# Patient Record
Sex: FEMALE | Race: BLACK OR AFRICAN AMERICAN | Employment: FULL TIME | URBAN - METROPOLITAN AREA
[De-identification: names, ages, dates, MRNs, and addresses within clinical notes are randomized per-mention and may not be internally consistent; named-entity substitution may affect disease eponyms.]

---

## 2017-01-25 ENCOUNTER — HOSPITAL ENCOUNTER (OUTPATIENT)
Dept: MAMMOGRAPHY | Age: 42
Discharge: HOME | End: 2017-01-25
Attending: OBSTETRICS & GYNECOLOGY
Payer: COMMERCIAL

## 2017-01-25 ENCOUNTER — HOSPITAL ENCOUNTER (OUTPATIENT)
Dept: ULTRASOUND IMAGING | Age: 42
Discharge: HOME | End: 2017-01-25
Attending: OBSTETRICS & GYNECOLOGY
Payer: COMMERCIAL

## 2017-01-25 DIAGNOSIS — N63.0 BREAST MASS: ICD-10-CM

## 2017-01-25 DIAGNOSIS — N63.10 MASSES OF BOTH BREASTS: Primary | ICD-10-CM

## 2017-01-25 DIAGNOSIS — N63.20 LEFT BREAST MASS: ICD-10-CM

## 2017-01-25 DIAGNOSIS — R92.8 ABNORMAL MAMMOGRAM OF BOTH BREASTS: Primary | ICD-10-CM

## 2017-01-25 DIAGNOSIS — N63.20 MASSES OF BOTH BREASTS: Primary | ICD-10-CM

## 2017-01-25 PROCEDURE — 76642 ULTRASOUND BREAST LIMITED: CPT

## 2017-01-25 PROCEDURE — 77066 DX MAMMO INCL CAD BI: CPT

## 2017-02-07 ENCOUNTER — HOSPITAL ENCOUNTER (OUTPATIENT)
Dept: MAMMOGRAPHY | Age: 42
Discharge: HOME | End: 2017-02-07
Attending: OBSTETRICS & GYNECOLOGY
Payer: COMMERCIAL

## 2017-02-07 DIAGNOSIS — R92.8 ABNORMAL MAMMOGRAM OF BOTH BREASTS: ICD-10-CM

## 2017-02-07 DIAGNOSIS — R92.0 BREAST MICROCALCIFICATIONS: ICD-10-CM

## 2017-02-07 DIAGNOSIS — N64.9 LESION OF BREAST: ICD-10-CM

## 2017-02-07 DIAGNOSIS — N63.20 BREAST MASS, LEFT: ICD-10-CM

## 2017-02-07 PROCEDURE — 74011000250 HC RX REV CODE- 250: Performed by: RADIOLOGY

## 2017-02-07 PROCEDURE — 19081 BX BREAST 1ST LESION STRTCTC: CPT

## 2017-02-07 PROCEDURE — 77065 DX MAMMO INCL CAD UNI: CPT

## 2017-02-07 PROCEDURE — 88305 TISSUE EXAM BY PATHOLOGIST: CPT | Performed by: OBSTETRICS & GYNECOLOGY

## 2017-02-07 RX ORDER — LIDOCAINE HYDROCHLORIDE AND EPINEPHRINE 10; 10 MG/ML; UG/ML
1.5 INJECTION, SOLUTION INFILTRATION; PERINEURAL
Status: COMPLETED | OUTPATIENT
Start: 2017-02-07 | End: 2017-02-07

## 2017-02-07 RX ORDER — LIDOCAINE HYDROCHLORIDE 10 MG/ML
10 INJECTION INFILTRATION; PERINEURAL
Status: COMPLETED | OUTPATIENT
Start: 2017-02-07 | End: 2017-02-07

## 2017-02-07 RX ADMIN — LIDOCAINE HYDROCHLORIDE 7 ML: 10 INJECTION, SOLUTION INFILTRATION; PERINEURAL at 08:47

## 2017-02-07 RX ADMIN — LIDOCAINE HYDROCHLORIDE,EPINEPHRINE BITARTRATE 10 MG: 10; .01 INJECTION, SOLUTION INFILTRATION; PERINEURAL at 09:36

## 2017-02-07 RX ADMIN — LIDOCAINE HYDROCHLORIDE 10 ML: 10 INJECTION, SOLUTION INFILTRATION; PERINEURAL at 09:37

## 2017-02-07 RX ADMIN — LIDOCAINE HYDROCHLORIDE,EPINEPHRINE BITARTRATE 10 ML: 10; .01 INJECTION, SOLUTION INFILTRATION; PERINEURAL at 08:48

## 2017-02-07 NOTE — IP AVS SNAPSHOT
Current Discharge Medication List  
  
ASK your doctor about these medications Dose & Instructions Dispensing Information Comments Morning Noon Evening Bedtime  
 cyanocobalamin 500 mcg tablet Commonly known as:  VITAMIN B12 Your next dose is: Today, Tomorrow Other:  ____________ Dose:  500 mcg Take 500 mcg by mouth daily. Refills:  0  
     
   
   
   
  
 triamcinolone 55 mcg nasal inhaler Commonly known as:  NASACORT AQ Your next dose is: Today, Tomorrow Other:  ____________ Dose:  2 Spray 2 Sprays by Both Nostrils route daily. Quantity:  1 Bottle Refills:  0

## 2017-02-07 NOTE — PROGRESS NOTES
POST STEREOTACTIC BIOPSY INSTRUCTIONS    1. WOUND CARE:   -Please keep an ice pack on the biopsy site for 20 minutes each hour through the day of the procedure. May     discontinue ice at bedtime. Do not apply ice directly to skin, protect by using thin cloth covering.    -The morning after biopsy, you may remove the gauze pad dressing. DO NOT REMOVE STERI-STRIPS(TAPE    STRIPS ON SKIN) FROM BIOPSY SITE. Allow them to wear off in 3-4 days. 2. Please wear your most supportive bra for 24 hours. Sleep in your bra the night of the biopsy. This will reduce the risk of bleeding, also help to hold ice pack in place. 3. You may shower after 24 hours, otherwise please keep site dry. Steri-strips may get wet. 4. If bleeding occurs from the site:      Apply direct pressure to the site, holding it tightly for 5 minutes      If bleeding continues after 5 minutes, call your physician  5. NO HEAVY LIFTING (GREATER THAN 5 lbs), vacuuming and/or sports activities for 48 hours after biopsy  6. Your pathology results are usually complete within 48 hours of the procedure time, excluding weekends. 7. You may take TYLENOL extra strength for pain. NO aspirin or aspirin containing products. 8. Call your doctor for a follow up appointment.

## 2017-02-07 NOTE — PROGRESS NOTES
Patient arrived for Left breast stereotactic biopsy with clip placement. Pt arrived in [facility] ambulatory, alert and oriented. Pt tolerated biopsy well without complaint. Specimens obtained, x-rayed and placed in labeled specimen containers for pathology. Direct pressure applied to incision site, bleeding controlled, steri- strips applied and covered with gauze prior to post mammogram films for confirmation of clip placement. Clean dry gauze and Ice pack applied and bra on. Reviewed post procedure discharge instructions with patient. Patient verbalizes understanding and written copy given to patient. Patient D/C'd home.

## 2017-02-07 NOTE — PROGRESS NOTES
Patient arrived for Right breast stereotactic biopsy with clip placement. Pt arrived in [facility] ambulatory, alert and oriented. Pt tolerated biopsy well without complaint. Specimens obtained, x-rayed and placed in labeled specimen containers for pathology. Direct pressure applied to incision site, bleeding controlled, steri- strips applied and covered with gauze prior to post mammogram films for confirmation of clip placement. Clean dry gauze and Ice pack applied and bra on. Reviewed post procedure discharge instructions with patient. Patient verbalizes understanding and written copy given to patient. Patient D/C'd home.

## 2017-02-07 NOTE — IP AVS SNAPSHOT
[facility] 
 
 
 [address] [facility] 
[phone] Patient: Beck Nowak MRN: [id number] :1975 You are allergic to the following No active allergies Recent Documentation OB Status Smoking Status Having regular periods Former Smoker Emergency Contacts Name Discharge Info Relation Home Work Mobile Izabela Mcgee  Other Relative [6] [phone] About your hospitalization You were admitted on:  2017 You last received care in the:  [facility] [place] [place] You were discharged on:  2017 Unit phone number:  [phone] Why you were hospitalized Your primary diagnosis was:  Not on File Providers Seen During Your Hospitalizations Provider Role Specialty Primary office phone Isabella Cardona DO Attending Provider Obstetrics & Gynecology [phone] Your Primary Care Physician (PCP) Primary Care Physician Office Phone Office Fax [address] [phone] Follow-up Information None Your Appointments 2017  9:00 AM EST  
[facility] 3D BELKYS MAMMO DX with [facility] ЕКАТЕРИНА STEREO BX RM 1 [facility] [address] [address] [facility] PA[facility]MENT  For Non-Medicare patients - $15.00 will be collected from you at the time of your exam.  You will be billed $35.00 from the reading [facility]. OUTSIDE FILMS  - Any outside films related to the study being scheduled should be brought with you on the day of the exam.  If this cannot be done there may be a delay in the reading of the study.   MEDICATIONS  - Patient must bring a complete list of all medications currently taking to include prescriptions, over-the-counter meds, herbals, vitamins & any dietary supplements GENERAL INSTRUCTIONS  - On the day of your exam do not use any bath powder, deodorant or lotions on the armpit area. CHECK IN INSTRUCTIONS  Check in to the [facility] [address] 15 minutes prior to your appointment. [address] ([address]  [address] [place], [address] [facility] [address] Current Discharge Medication List  
  
ASK your doctor about these medications Dose & Instructions Dispensing Information Comments Morning Noon Evening Bedtime  
 cyanocobalamin 500 mcg tablet Commonly known as:  VITAMIN B12 Your next dose is: Today, Tomorrow Other:  _________ Dose:  500 mcg Take 500 mcg by mouth daily. Refills:  0  
     
   
   
   
  
 triamcinolone 55 mcg nasal inhaler Commonly known as:  NASACORT AQ Your next dose is: Today, Tomorrow Other:  _________ Dose:  2 Spray 2 Sprays by Both Nostrils route daily. Quantity:  1 Bottle Refills:  0 Discharge Instructions POST STEREOTACTIC BIOPSY INSTRUCTIONS 1. WOUND CARE: 
 -Please keep an ice pack on the biopsy site for 20 minutes each hour through the day of the procedure. May     discontinue ice at bedtime. Do not apply ice directly to skin, protect by using thin cloth covering. 
 
-The morning after biopsy, you may remove the gauze pad dressing. DO NOT REMOVE STERI-STRIPS(TAPE    STRIPS ON SKIN) FROM BIOPSY SITE. Allow them to wear off in 3-4 days. 2. Please wear your most supportive bra for 24 hours. Sleep in your bra the night of the biopsy. This will reduce the risk of bleeding, also help to hold ice pack in place. 3. You may shower after 24 hours, otherwise please keep site dry. Steri-strips may get wet. 4. If bleeding occurs from the site: 
    Apply direct pressure to the site, holding it tightly for 5 minutes If bleeding continues after 5 minutes, call your physician 5. NO HEAVY LIFTING (GREATER THAN 5 lbs), vacuuming and/or sports activities for 48 hours after biopsy 6. Your pathology results are usually complete within 48 hours of the procedure time, excluding weekends. 7. You may take TYLENOL extra strength for pain. NO aspirin or aspirin containing products. 8. Call your doctor for a follow up appointment. Discharge Orders None Introducing [facility] & HEALTH SERVICES! [facility] introduces [employer] patient portal. Now you can access parts of your medical record, email your doctor's office, and request medication refills online. 1. In your internet browser, go to [email or web address] 2. Click on the First Time User? Click Here link in the Sign In box. You will see the New Member Sign Up page. 3. Enter your [employer] Access Code exactly as it appears below. You will not need to use this code after youve completed the sign-up process. If you do not sign up before the expiration date, you must request a new code. · [employer] Access Code: [email or web address] Expires: 3/22/2017  3:06 PM 
 
4. Enter the last four digits of your Social Security Number (xxxx) and Date of Birth (mm/dd/yyyy) as indicated and click Submit. You will be taken to the next sign-up page. 5. Create a [employer] ID. This will be your [employer] login ID and cannot be changed, so think of one that is secure and easy to remember. 6. Create a [employer] password. You can change your password at any time. 7. Enter your Password Reset Question and Answer. This can be used at a later time if you forget your password. 8. Enter your e-mail address. You will receive e-mail notification when new information is available in [address] 9. Click Sign Up. You can now view and download portions of your medical record. 10. Click the Download Summary menu link to download a portable copy of your medical information.  
 
If you have questions, please visit the Frequently Asked Questions section of the [employer]. Remember, MyChart is NOT to be used for urgent needs. For medical emergencies, dial 911. Now available from your iPhone and Android! General Information Please provide this summary of care documentation to your next provider. Patient Signature:  ____________________________________________________________ Date:  ____________________________________________________________  
  
[facility] [place] Provider Signature:  ____________________________________________________________ Date:  ____________________________________________________________

## 2017-06-12 ENCOUNTER — OFFICE VISIT (OUTPATIENT)
Dept: INTERNAL MEDICINE CLINIC | Age: 42
End: 2017-06-12

## 2017-06-12 VITALS
RESPIRATION RATE: 14 BRPM | TEMPERATURE: 97.2 F | DIASTOLIC BLOOD PRESSURE: 77 MMHG | SYSTOLIC BLOOD PRESSURE: 118 MMHG | HEIGHT: 63 IN | OXYGEN SATURATION: 100 % | HEART RATE: 93 BPM | BODY MASS INDEX: 24.59 KG/M2 | WEIGHT: 138.8 LBS

## 2017-06-12 DIAGNOSIS — M54.9 UPPER BACK PAIN: Primary | ICD-10-CM

## 2017-06-12 RX ORDER — IBUPROFEN 200 MG
TABLET ORAL
COMMUNITY
End: 2017-06-12 | Stop reason: DRUGHIGH

## 2017-06-12 RX ORDER — IBUPROFEN 800 MG/1
800 TABLET ORAL
Qty: 30 TAB | Refills: 0 | Status: SHIPPED | OUTPATIENT
Start: 2017-06-12 | End: 2018-01-25

## 2017-06-12 NOTE — LETTER
NOTIFICATION RETURN TO WORK / SCHOOL 
 
6/12/2017 2:58 PM 
 
Ms. Ruthie Song [address] [place] [address] [phone] To Whom It May Concern: Ruthie Song is currently under the care of Oscar Roy. She will return to work on 6/14/17. If there are questions or concerns please have the patient contact our office. Sincerely, Romero Kern MD

## 2017-06-12 NOTE — MR AVS SNAPSHOT
Visit Information Date & Time Provider Department Dept. Phone Encounter #  
 6/12/2017  2:30 PM Yogi Hernandez MD [employer] [phone] [id number] Follow-up Instructions Return if symptoms worsen or fail to improve. Upcoming Health Maintenance Date Due  
 PAP AKA CERVICAL CYTOLOGY 11/1/2016 INFLUENZA AGE 9 TO ADULT 8/1/2017 DTaP/Tdap/Td series (2 - Td) 4/22/2025 Allergies as of 6/12/2017  Review Complete On: 6/12/2017 By: Reinaldo Lock MD  
 No Known Allergies Current Immunizations  Reviewed on 7/14/2016 Name Date Influenza Vaccine (Quad) PF 1/13/2016 11:30 AM  
 Tdap 4/22/2015 10:37 AM  
  
 Not reviewed this visit You Were Diagnosed With   
  
 Codes Comments Upper back pain    -  Primary ICD-10-CM: M54.9 ICD-9-CM: 724.5 Vitals BP Pulse Temp Resp Height(growth percentile) Weight(growth percentile) 118/77 (BP 1 Location: Right arm, BP Patient Position: Sitting) 93 97.2 °F (36.2 °C) (Oral) 14 5' 3\" (1.6 m) 138 lb 12.8 oz (63 kg) SpO2 BMI OB Status Smoking Status 100% 24.59 kg/m2 Having regular periods Former Smoker BMI and BSA Data Body Mass Index Body Surface Area 24.59 kg/m 2 1.67 m 2 Preferred Pharmacy Pharmacy Name Phone [address] Your Updated Medication List  
  
   
This list is accurate as of: 6/12/17  2:56 PM.  Always use your most recent med list.  
  
  
  
  
 cyanocobalamin 500 mcg tablet Commonly known as:  VITAMIN B12 Take 500 mcg by mouth daily. ibuprofen 800 mg tablet Commonly known as:  MOTRIN Take 1 Tab by mouth every eight (8) hours as needed for Pain. Prescriptions Sent to Pharmacy Refills  
 ibuprofen (MOTRIN) 800 mg tablet 0 Sig: Take 1 Tab by mouth every eight (8) hours as needed for Pain.   
 Class: Normal  
 Pharmacy: [address] [place] [address] #: [phone] Route: Oral  
  
Follow-up Instructions Return if symptoms worsen or fail to improve. Patient Instructions 1) use heating pad, icy/hot, tiger balm. 2) Take ibuprofen With food. If you notice any blood/black tarry stools, diarrhea, abdominal pain, nausea, vomiting then stop medication immediately. Give us a call ASAP. Healthy Upper Back: Exercises Your Care Instructions Here are some examples of exercises for your upper back. Start each exercise slowly. Ease off the exercise if you start to have pain. Your doctor or physical therapist will tell you when you can start these exercises and which ones will work best for you. How to do the exercises Lower neck and upper back stretch 1. Stretch your arms out in front of your body. Clasp one hand on top of your other hand. 2. Gently reach out so that you feel your shoulder blades stretching away from each other. 3. Gently bend your head forward. 4. Hold for 15 to 30 seconds. 5. Repeat 2 to 4 times. Midback stretch Note: If you have knee pain, do not do this exercise. 1. Kneel on the floor, and sit back on your ankles. 2. Lean forward, place your hands on the floor, and stretch your arms out in front of you. Rest your head between your arms. 3. Gently push your chest toward the floor, reaching as far in front of you as possible. 4. Hold for 15 to 30 seconds. 5. Repeat 2 to 4 times. Shoulder rolls 1. Sit comfortably with your feet shoulder-width apart. You can also do this exercise while standing. 2. Roll your shoulders up, then back, and then down in a smooth, circular motion. 3. Repeat 2 to 4 times. Wall push-up 1. Stand against a wall with your feet about 12 to 24 inches back from the wall. If you feel any pain when you do this exercise, stand closer to the wall. 2. Place your hands on the wall slightly wider apart than your shoulders, and lean forward. 3. Gently lean your body toward the wall. Then push back to your starting position. Keep the motion smooth and controlled. 4. Repeat 8 to 12 times. Resisted shoulder blade squeeze Note: For this exercise, you will need elastic exercise material, such as surgical tubing or Thera-Band. 1. Sit or stand, holding the band in both hands in front of you. Keep your elbows close to your sides, bent at a 90-degree angle. Your palms should face up. 2. Squeeze your shoulder blades together, and move your arms to the outside, stretching the band. Be sure to keep your elbows at your sides while you do this. 3. Relax. 4. Repeat 8 to 12 times. Resisted rows Note: For this exercise, you will need elastic exercise material, such as surgical tubing or Thera-Band. 1. Put the band around a solid object, such as a bedpost, at about waist level. Hold one end of the band in each hand. 2. With your elbows at your sides and bent to 90 degrees, pull the band back to move your shoulder blades toward each other. Return to the starting position. 3. Repeat 8 to 12 times. Follow-up care is a key part of your treatment and safety. Be sure to make and go to all appointments, and call your doctor if you are having problems. It's also a good idea to know your test results and keep a list of the medicines you take. Where can you learn more? Go to http://luana-catalina.info/. Enter E321 in the search box to learn more about \"Healthy Upper Back: Exercises. \" Current as of: May 23, 2016 Content Version: 11.2 © 7447-3207 [employer], [employer]. Care instructions adapted under license by [employer] (which disclaims liability or warranty for this information).  If you have questions about a medical condition or this instruction, always ask your healthcare professional. Sherley Santiago, [employer] disclaims any warranty or liability for your use of this information. Introducing [facility] & HEALTH SERVICES! [facility] introduces [employer] patient portal. Now you can access parts of your medical record, email your doctor's office, and request medication refills online. 1. In your internet browser, go to [email or web address] 2. Click on the First Time User? Click Here link in the Sign In box. You will see the New Member Sign Up page. 3. Enter your [employer] Access Code exactly as it appears below. You will not need to use this code after youve completed the sign-up process. If you do not sign up before the expiration date, you must request a new code. · [employer] Access Code: [email or web address] Expires: 8/27/2017  4:36 PM 
 
4. Enter the last four digits of your Social Security Number (xxxx) and Date of Birth (mm/dd/yyyy) as indicated and click Submit. You will be taken to the next sign-up page. 5. Create a [employer] ID. This will be your [employer] login ID and cannot be changed, so think of one that is secure and easy to remember. 6. Create a [employer] password. You can change your password at any time. 7. Enter your Password Reset Question and Answer. This can be used at a later time if you forget your password. 8. Enter your e-mail address. You will receive e-mail notification when new information is available in [address] 9. Click Sign Up. You can now view and download portions of your medical record. 10. Click the Download Summary menu link to download a portable copy of your medical information. If you have questions, please visit the Frequently Asked Questions section of the [employer] website. Remember, [employer] is NOT to be used for urgent needs. For medical emergencies, dial 911. Now available from your iPhone and Android! Please provide this summary of care documentation to your next provider. Your primary care clinician is listed as Yogi Mayberry. If you have any questions after today's visit, please call [phone].

## 2017-06-12 NOTE — PATIENT INSTRUCTIONS
1) use heating pad, icy/hot, tiger balm. 2) Take ibuprofen With food. If you notice any blood/black tarry stools, diarrhea, abdominal pain, nausea, vomiting then stop medication immediately. Give us a call ASAP. Healthy Upper Back: Exercises  Your Care Instructions  Here are some examples of exercises for your upper back. Start each exercise slowly. Ease off the exercise if you start to have pain. Your doctor or physical therapist will tell you when you can start these exercises and which ones will work best for you. How to do the exercises  Lower neck and upper back stretch    1. Stretch your arms out in front of your body. Clasp one hand on top of your other hand. 2. Gently reach out so that you feel your shoulder blades stretching away from each other. 3. Gently bend your head forward. 4. Hold for 15 to 30 seconds. 5. Repeat 2 to 4 times. Midback stretch    Note: If you have knee pain, do not do this exercise. 1. Kneel on the floor, and sit back on your ankles. 2. Lean forward, place your hands on the floor, and stretch your arms out in front of you. Rest your head between your arms. 3. Gently push your chest toward the floor, reaching as far in front of you as possible. 4. Hold for 15 to 30 seconds. 5. Repeat 2 to 4 times. Shoulder rolls    1. Sit comfortably with your feet shoulder-width apart. You can also do this exercise while standing. 2. Roll your shoulders up, then back, and then down in a smooth, circular motion. 3. Repeat 2 to 4 times. Wall push-up    1. Stand against a wall with your feet about 12 to 24 inches back from the wall. If you feel any pain when you do this exercise, stand closer to the wall. 2. Place your hands on the wall slightly wider apart than your shoulders, and lean forward. 3. Gently lean your body toward the wall. Then push back to your starting position. Keep the motion smooth and controlled. 4. Repeat 8 to 12 times.   Resisted shoulder blade squeeze    Note: For this exercise, you will need elastic exercise material, such as surgical tubing or Thera-Band. 1. Sit or stand, holding the band in both hands in front of you. Keep your elbows close to your sides, bent at a 90-degree angle. Your palms should face up. 2. Squeeze your shoulder blades together, and move your arms to the outside, stretching the band. Be sure to keep your elbows at your sides while you do this. 3. Relax. 4. Repeat 8 to 12 times. Resisted rows    Note: For this exercise, you will need elastic exercise material, such as surgical tubing or Thera-Band. 1. Put the band around a solid object, such as a bedpost, at about waist level. Hold one end of the band in each hand. 2. With your elbows at your sides and bent to 90 degrees, pull the band back to move your shoulder blades toward each other. Return to the starting position. 3. Repeat 8 to 12 times. Follow-up care is a key part of your treatment and safety. Be sure to make and go to all appointments, and call your doctor if you are having problems. It's also a good idea to know your test results and keep a list of the medicines you take. Where can you learn more? Go to http://luana-catalina.info/. Enter Q617 in the search box to learn more about \"Healthy Upper Back: Exercises. \"  Current as of: May 23, 2016  Content Version: 11.2  © 6621-0633 [employer], [employer]. Care instructions adapted under license by [employer] (which disclaims liability or warranty for this information). If you have questions about a medical condition or this instruction, always ask your healthcare professional. [place] [address] any warranty or liability for your use of this information.

## 2017-06-12 NOTE — PROGRESS NOTES
ROOM # 1    Poppy Cotton presents today for   Chief Complaint   Patient presents with    Back Pain     Patients that she has upper back pain x1 day, Patient also states that she took 2 advil at 7:00 am and 2 at12:15pm. Patient denies falling or trauma        Jovana Mcgee preferred language for health care discussion is english/other. Is someone accompanying this pt? no    Is the patient using any DME equipment during OV? no    Depression Screening:  PHQ over the last two weeks 6/12/2017 7/14/2016 7/14/2016 1/13/2016 4/1/2014   Little interest or pleasure in doing things Not at all Not at all Not at all Not at all Not at all   Feeling down, depressed or hopeless Not at all Not at all Not at all Not at all Several days   Total Score PHQ 2 0 0 0 0 1       Learning Assessment:  Learning Assessment 6/12/2017 4/22/2015 11/1/2013 3/26/2013   PRIMARY LEARNER Patient Patient Patient Patient   HIGHEST LEVEL OF EDUCATION - PRIMARY LEARNER  [facility] - -   CO-LEARNER CAREGIVER No - - -   [address]   LEARNER PREFERENCE PRIMARY DEMONSTRATION DEMONSTRATION READING LISTENING     - - DEMONSTRATION -   ANSWERED BY patient Patient patient Patient   RELATIONSHIP SELF SELF SELF SELF       Abuse Screening:  No flowsheet data found. Fall Risk  No flowsheet data found. Health Maintenance reviewed and discussed per provider. Yes    Poppy Cotton is due for PAP (getting release for records). Please order/place referral if appropriate. Advance Directive:  1. Do you have an advance directive in place? Patient Reply: no     2. If not, would you like material regarding how to put one in place? Patient Reply: yes    Coordination of Care:  1. Have you been to the ER, urgent care clinic since your last visit? Hospitalized since your last visit? no    2.  Have you seen or consulted any other health care providers outside of the [facility] since your last visit? Include any pap smears or colon screening.  no

## 2017-06-12 NOTE — PROGRESS NOTES
Chief Complaint   Patient presents with    Back Pain     Patients that she has upper back pain x1 day, Patient also states that she took 2 advil at 7:00 am and 2 at12:15pm. Patient denies falling or trauma        HPI:     Ashley Hansen is a 38 y.o.  female with history of  Fibroid and iron deficiency anemia  here for the above complaint. She is having upper back pain that started yesterday. No trauma. Pain scale: 9/10. Off and on pain. She took two 200mg advil this AM and at 12:15pm. Sharp stabbing pain. No radiation. She has to move around to decrease the pain. advil does not help. She denies any chest pain, shortness of breath, abdominal pain, headaches or dizziness. No dysuria, hematuria, increase urgency or frequency. She initially said mid back pain, that's why asked urinary symptoms. However, on exam, pt pointing to upper back area. Past Medical History:   Diagnosis Date    Anemia NEC as a child    iron deficiency     Breast lump 2017    Left Breast Lump    Fibroid      Past Surgical History:   Procedure Laterality Date    ABDOMEN SURGERY PROC UNLISTED  ? or 2006    ob/gyn    HX  SECTION       Current Outpatient Prescriptions   Medication Sig    ibuprofen (MOTRIN) 800 mg tablet Take 1 Tab by mouth every eight (8) hours as needed for Pain.  cyanocobalamin (VITAMIN B12) 500 mcg tablet Take 500 mcg by mouth daily. No current facility-administered medications for this visit. Health Maintenance   Topic Date Due    PAP AKA CERVICAL CYTOLOGY  2016    INFLUENZA AGE 9 TO ADULT  2017    DTaP/Tdap/Td series (2 - Td) 2025     Immunization History   Administered Date(s) Administered    Influenza Vaccine (Quad) PF 2016    Tdap 2015     No LMP recorded.         Allergies and Intolerances:   No Known Allergies    Family History:   Family History   Problem Relation Age of Onset    Hypertension Mother     No Known Problems Father Social History:   She  reports that she quit smoking about 9 years ago. Her smoking use included Cigarettes and Cigars. She smoked 0.09 packs per day. She has never used smokeless tobacco.  She  reports that she drinks about 0.5 oz of alcohol per week         ·     OBJECTIVE:   Physical exam:   Visit Vitals    /77 (BP 1 Location: Right arm, BP Patient Position: Sitting)    Pulse 93    Temp 97.2 °F (36.2 °C) (Oral)    Resp 14    Ht 5' 3\" (1.6 m)    Wt 138 lb 12.8 oz (63 kg)    SpO2 100%    BMI 24.59 kg/m2        Generally: Pleasant female in no acute distress  Cardiac Exam: regular, rate, and rhythm. Normal S1 and S2. No murmurs, gallops, or rubs  Pulmonary exam: Clear to auscultation bilaterally  Abdominal exam: Positive bowel sounds in all four quadrants, soft, nondistended, nontender  Extremities: 2+ dorsalis pedis pulses bilaterally. No pedal edema    bilaterally  Upper back exam: TTP in the upper back area just above the bra line. LABS/RADIOLOGICAL TESTS:  Lab Results   Component Value Date/Time    WBC 4.2 04/22/2015 10:31 AM    HGB 10.5 04/22/2015 10:31 AM    HCT 36.0 04/22/2015 10:31 AM    PLATELET 547 76/93/2815 10:31 AM     Lab Results   Component Value Date/Time    Sodium 140 12/27/2013 07:46 AM    Potassium 3.2 12/27/2013 07:46 AM    Chloride 107 12/27/2013 07:46 AM    CO2 27 12/27/2013 07:46 AM    Glucose 68 12/27/2013 07:46 AM    BUN 6 12/27/2013 07:46 AM    Creatinine 0.79 12/27/2013 07:46 AM     Lab Results   Component Value Date/Time    Cholesterol, total 145 03/29/2013 12:00 AM    HDL Cholesterol 53 03/29/2013 12:00 AM    LDL, calculated 77 03/29/2013 12:00 AM    Triglyceride 74 03/29/2013 12:00 AM     No results found for: GPT    Previous labs    ASSESSMENT/PLAN:    1. Upper back pain: musculoskeletal. Use heating pad, icy/hot, tiger balm. Given exercises. Pt told to take ibuprofen With food.  If you notice any blood/black tarry stools, diarrhea, abdominal pain, nausea, vomiting then stop medication immediately. Give us a call ASAP. -     ibuprofen (MOTRIN) 800 mg tablet; Take 1 Tab by mouth every eight (8) hours as needed for Pain. 2. Return to work letter for 6/14/17. Requested Prescriptions     Signed Prescriptions Disp Refills    ibuprofen (MOTRIN) 800 mg tablet 30 Tab 0     Sig: Take 1 Tab by mouth every eight (8) hours as needed for Pain. 3. Patient verbalized understanding and agreement with the plan. 4. Patient was given an after-visit summary. 5.   Follow-up Disposition:  Return if symptoms worsen or fail to improve. or sooner if worsening symptoms.           Ana Dorsey MD

## 2017-06-13 ENCOUNTER — DOCUMENTATION ONLY (OUTPATIENT)
Dept: INTERNAL MEDICINE CLINIC | Age: 42
End: 2017-06-13

## 2017-06-13 NOTE — PROGRESS NOTES
Pt wants more time off from work because back is still hurting. Return to work letter generated to go back to work on 6/16/17. Pt will come and  letter.

## 2018-01-25 ENCOUNTER — OFFICE VISIT (OUTPATIENT)
Dept: INTERNAL MEDICINE CLINIC | Age: 43
End: 2018-01-25

## 2018-01-25 VITALS
SYSTOLIC BLOOD PRESSURE: 100 MMHG | BODY MASS INDEX: 25.45 KG/M2 | TEMPERATURE: 96.8 F | RESPIRATION RATE: 17 BRPM | HEIGHT: 63 IN | WEIGHT: 143.6 LBS | HEART RATE: 87 BPM | OXYGEN SATURATION: 100 % | DIASTOLIC BLOOD PRESSURE: 66 MMHG

## 2018-01-25 DIAGNOSIS — J40 BRONCHITIS: Primary | ICD-10-CM

## 2018-01-25 RX ORDER — BENZONATATE 100 MG/1
100 CAPSULE ORAL
Qty: 15 CAP | Refills: 0 | Status: SHIPPED | OUTPATIENT
Start: 2018-01-25 | End: 2019-03-25

## 2018-01-25 RX ORDER — AZITHROMYCIN 250 MG/1
TABLET, FILM COATED ORAL
Qty: 6 TAB | Refills: 0 | Status: SHIPPED | OUTPATIENT
Start: 2018-01-25 | End: 2018-01-30

## 2018-01-25 RX ORDER — LORATADINE 10 MG/1
10 TABLET ORAL DAILY
COMMUNITY
End: 2019-03-25

## 2018-01-25 NOTE — PROGRESS NOTES
ROOM # 2    Layton Opitz presents today for   Chief Complaint   Patient presents with    Headache    Generalized Body Aches     since saturday. Afebrile.  Cough     dry cough    Letter for School/Work     return to work Keyla Bolden preferred language for health care discussion is english/other. Is someone accompanying this pt? no    Is the patient using any DME equipment during OV? no    Depression Screening:  PHQ over the last two weeks 6/12/2017 7/14/2016 7/14/2016 1/13/2016 4/1/2014   Little interest or pleasure in doing things Not at all Not at all Not at all Not at all Not at all   Feeling down, depressed or hopeless Not at all Not at all Not at all Not at all Several days   Total Score PHQ 2 0 0 0 0 1       Learning Assessment:  Learning Assessment 6/12/2017 4/22/2015 11/1/2013 3/26/2013   PRIMARY LEARNER Patient Patient Patient Patient   HIGHEST LEVEL OF EDUCATION - PRIMARY LEARNER  [facility] - -   CO-LEARNER CAREGIVER No - - -   [address]   LEARNER PREFERENCE PRIMARY DEMONSTRATION DEMONSTRATION READING LISTENING     - - DEMONSTRATION -   ANSWERED BY patient Patient patient Patient   RELATIONSHIP SELF SELF SELF SELF       Abuse Screening:  No flowsheet data found. Fall Risk  No flowsheet data found. Health Maintenance reviewed and discussed per provider. Yes    Layton Opitz is due for pap smear (pt. To schedule aptmt). Please order/place referral if appropriate. Advance Directive:  1. Do you have an advance directive in place? Patient Reply: no    2. If not, would you like material regarding how to put one in place? Patient Reply: no    Coordination of Care:  1. Have you been to the ER, urgent care clinic since your last visit? Hospitalized since your last visit? no    2.  Have you seen or consulted any other health care providers outside of the [address] [place] [address] since your last visit? Include any pap smears or colon screening.  no

## 2018-01-25 NOTE — LETTER
NOTIFICATION RETURN TO WORK / SCHOOL 
 
1/25/2018 2:29 PM 
 
Ms. Keren Bishop [address] [facility] [address] [id number] To Whom It May Concern: Keren Bishop is currently under the care of Oscar Roy. She will return to work on 1/29/18. If there are questions or concerns please have the patient contact our office. Sincerely, Johnnie Candelaria MD

## 2018-01-25 NOTE — PATIENT INSTRUCTIONS
1) follow-up as needed or sooner if worsening symptoms. 2) don't take the dayquil or theraflu     Bronchitis: Care Instructions  Your Care Instructions    Bronchitis is inflammation of the bronchial tubes, which carry air to the lungs. The tubes swell and produce mucus, or phlegm. The mucus and inflamed bronchial tubes make you cough. You may have trouble breathing. Most cases of bronchitis are caused by viruses like those that cause colds. Antibiotics usually do not help and they may be harmful. Bronchitis usually develops rapidly and lasts about 2 to 3 weeks in otherwise healthy people. Follow-up care is a key part of your treatment and safety. Be sure to make and go to all appointments, and call your doctor if you are having problems. It's also a good idea to know your test results and keep a list of the medicines you take. How can you care for yourself at home? · Take all medicines exactly as prescribed. Call your doctor if you think you are having a problem with your medicine. · Get some extra rest.  · Take an over-the-counter pain medicine, such as acetaminophen (Tylenol), ibuprofen (Advil, Motrin), or naproxen (Aleve) to reduce fever and relieve body aches. Read and follow all instructions on the label. · Do not take two or more pain medicines at the same time unless the doctor told you to. Many pain medicines have acetaminophen, which is Tylenol. Too much acetaminophen (Tylenol) can be harmful. · Take an over-the-counter cough medicine that contains dextromethorphan to help quiet a dry, hacking cough so that you can sleep. Avoid cough medicines that have more than one active ingredient. Read and follow all instructions on the label. · Breathe moist air from a humidifier, hot shower, or sink filled with hot water. The heat and moisture will thin mucus so you can cough it out. · Do not smoke. Smoking can make bronchitis worse.  If you need help quitting, talk to your doctor about stop-smoking programs and medicines. These can increase your chances of quitting for good. When should you call for help? Call 911 anytime you think you may need emergency care. For example, call if:  ? · You have severe trouble breathing. ?Call your doctor now or seek immediate medical care if:  ? · You have new or worse trouble breathing. ? · You cough up dark brown or bloody mucus (sputum). ? · You have a new or higher fever. ? · You have a new rash. ? Watch closely for changes in your health, and be sure to contact your doctor if:  ? · You cough more deeply or more often, especially if you notice more mucus or a change in the color of your mucus. ? · You are not getting better as expected. Where can you learn more? Go to http://luana-catalina.info/. Enter H333 in the search box to learn more about \"Bronchitis: Care Instructions. \"  Current as of: May 12, 2017  Content Version: 11.4  © 1627-3866 Healthwise, Incorporated. Care instructions adapted under license by [employer] (which disclaims liability or warranty for this information). If you have questions about a medical condition or this instruction, always ask your healthcare professional. [address] any warranty or liability for your use of this information.

## 2018-01-25 NOTE — PROGRESS NOTES
Chief Complaint   Patient presents with    Headache    Generalized Body Aches     since saturday. Afebrile.  Cough     dry cough    Letter for School/Work     return to work monday       HPI:     Keren Bishop is a 38 y.o.   female with history of anemia   here for the above complaint. Her symptoms started last Friday. She has cough productive of mucous and green. She said when she coughs, her chest hurts. No shortness of breath, dizziness, abdominal pain. She has headaches. She was using theraflu and dayquil. No fevers or chills, but night sweats. Was having body aches. Past Medical History:   Diagnosis Date    Anemia NEC as a child    iron deficiency     Breast lump 2017    Left Breast Lump    Fibroid      Past Surgical History:   Procedure Laterality Date    ABDOMEN SURGERY PROC UNLISTED  ? or     ob/gyn    HX  SECTION       Current Outpatient Prescriptions   Medication Sig    loratadine (CLARITIN) 10 mg tablet Take 10 mg by mouth daily.  Pheniramine-Phenyleph-Acetamin (THERAFLU FLU-SORE THROAT) 20- mg pwpk Take  by mouth.  azithromycin (ZITHROMAX) 250 mg tablet Take 2 tablets today, then take 1 tablet daily    benzonatate (TESSALON) 100 mg capsule Take 1 Cap by mouth three (3) times daily as needed for Cough.  cyanocobalamin (VITAMIN B12) 500 mcg tablet Take 500 mcg by mouth daily. No current facility-administered medications for this visit. Health Maintenance   Topic Date Due    PAP AKA CERVICAL CYTOLOGY  2016    Influenza Age 5 to Adult  2018 (Originally 2017)    DTaP/Tdap/Td series (2 - Td) 2025     Immunization History   Administered Date(s) Administered    Influenza Vaccine (Quad) PF 2016    Tdap 2015     Patient's last menstrual period was 2018.         Allergies and Intolerances:   No Known Allergies    Family History:   Family History   Problem Relation Age of Onset    Hypertension Mother     No Known Problems Father        Social History:   She  reports that she quit smoking about 10 years ago. Her smoking use included Cigarettes and Cigars. She smoked 0.09 packs per day. She has never used smokeless tobacco.  She  reports that she drinks about 0.5 oz of alcohol per week               ·     OBJECTIVE:   Physical exam:   Visit Vitals    /66 (BP 1 Location: Left arm, BP Patient Position: Sitting)    Pulse 87    Temp 96.8 °F (36 °C) (Oral)    Resp 17    Ht 5' 3\" (1.6 m)    Wt 143 lb 9.6 oz (65.1 kg)    LMP 01/18/2018    SpO2 100%    BMI 25.44 kg/m2        Generally: Pleasant female in no acute distress  HEENT Exam: Head: atraumatic, acephalic                Eyes: PERRLA     Ears: blocked bilaterally with cerumen. Nares: mucosal membranes moist, no erythema    Mouth: Clear, no exudate, positive for erythema    Neck: supple, no LAD    Cardiac Exam: regular, rate, and rhythm. Normal S1 and S2. No murmurs, gallops, or rubs  Pulmonary exam: Clear to auscultation bilaterally    LABS/RADIOLOGICAL TESTS:  Lab Results   Component Value Date/Time    WBC 4.2 04/22/2015 10:31 AM    HGB 10.5 04/22/2015 10:31 AM    HCT 36.0 04/22/2015 10:31 AM    PLATELET 652 41/20/3078 10:31 AM     Lab Results   Component Value Date/Time    Sodium 140 12/27/2013 07:46 AM    Potassium 3.2 12/27/2013 07:46 AM    Chloride 107 12/27/2013 07:46 AM    CO2 27 12/27/2013 07:46 AM    Glucose 68 12/27/2013 07:46 AM    BUN 6 12/27/2013 07:46 AM    Creatinine 0.79 12/27/2013 07:46 AM     Lab Results   Component Value Date/Time    Cholesterol, total 145 03/29/2013 12:00 AM    HDL Cholesterol 53 03/29/2013 12:00 AM    LDL, calculated 77 03/29/2013 12:00 AM    Triglyceride 74 03/29/2013 12:00 AM     No results found for: GPT    All previous labs    ASSESSMENT/PLAN:    1. Bronchitis  -     azithromycin (ZITHROMAX) 250 mg tablet;  Take 2 tablets today, then take 1 tablet daily  -     benzonatate (TESSALON) 100 mg capsule; Take 1 Cap by mouth three (3) times daily as needed for Cough. Stop the theraflu and dayquil. Return to work letter given to go back to work on 1/29/18. 2.   Requested Prescriptions     Signed Prescriptions Disp Refills    azithromycin (ZITHROMAX) 250 mg tablet 6 Tab 0     Sig: Take 2 tablets today, then take 1 tablet daily    benzonatate (TESSALON) 100 mg capsule 15 Cap 0     Sig: Take 1 Cap by mouth three (3) times daily as needed for Cough. 3. Patient verbalized understanding and agreement with the plan. 4. Patient was given an after-visit summary. 5. Follow-up Disposition:  Return if symptoms worsen or fail to improve. or sooner if worsening symptoms.           Arslan Chowdary M.D.

## 2018-01-25 NOTE — MR AVS SNAPSHOT
[facility] 
 
 
 [address] [facility] [id number] 
[phone] Patient: Juice Chaney MRN: [id number] :1975 Visit Information Date & Time Provider Department Dept. Phone Encounter #  
 2018  2:00 PM MD Joshua Damon[address] [phone] [id number] Follow-up Instructions Return if symptoms worsen or fail to improve. Upcoming Health Maintenance Date Due  
 PAP AKA CERVICAL CYTOLOGY 2016 Influenza Age 5 to Adult 2018* DTaP/Tdap/Td series (2 - Td) 2025 *Topic was postponed. The date shown is not the original due date. Allergies as of 2018  Review Complete On: 2018 By: Leia Lewis MD  
 No Known Allergies Current Immunizations  Reviewed on 2016 Name Date Influenza Vaccine (Quad) PF 2016 11:30 AM  
 Tdap 2015 10:37 AM  
  
 Not reviewed this visit You Were Diagnosed With   
  
 Codes Comments Bronchitis    -  Primary ICD-10-CM: E66 ICD-9-CM: 083 Vitals BP Pulse Temp Resp Height(growth percentile) Weight(growth percentile) 100/66 (BP 1 Location: Left arm, BP Patient Position: Sitting) 87 96.8 °F (36 °C) (Oral) 17 5' 3\" (1.6 m) 143 lb 9.6 oz (65.1 kg) LMP SpO2 BMI OB Status Smoking Status 2018 100% 25.44 kg/m2 Having regular periods Former Smoker Vitals History BMI and BSA Data Body Mass Index Body Surface Area  
 25.44 kg/m 2 1.7 m 2 Preferred Pharmacy Pharmacy Name Phone [facility] [phone] Your Updated Medication List  
  
   
This list is accurate as of: 18  2:31 PM.  Always use your most recent med list.  
  
  
  
  
 azithromycin 250 mg tablet Commonly known as:  Chase Sandy Take 2 tablets today, then take 1 tablet daily  
  
 benzonatate 100 mg capsule Commonly known as:  TESSALON Take 1 Cap by mouth three (3) times daily as needed for Cough. cyanocobalamin 500 mcg tablet Commonly known as:  VITAMIN B12 Take 500 mcg by mouth daily. loratadine 10 mg tablet Commonly known as:  Sindy Grandy Take 10 mg by mouth daily. THERAFLU FLU-SORE THROAT 20- mg Pwpk Generic drug:  Pheniramine-Phenyleph-Acetamin Take  by mouth. Prescriptions Sent to Pharmacy Refills  
 azithromycin (ZITHROMAX) 250 mg tablet 0 Sig: Take 2 tablets today, then take 1 tablet daily Class: Normal  
 Pharmacy: [address] Ph #: [phone]  
 benzonatate (TESSALON) 100 mg capsule 0 Sig: Take 1 Cap by mouth three (3) times daily as needed for Cough. Class: Normal  
 Pharmacy: [facility] [address] [place] [address] Ph #: [phone] Route: Oral  
  
Follow-up Instructions Return if symptoms worsen or fail to improve. Patient Instructions 1) follow-up as needed or sooner if worsening symptoms. 2) don't take the dayquil or theraflu Bronchitis: Care Instructions Your Care Instructions Bronchitis is inflammation of the bronchial tubes, which carry air to the lungs. The tubes swell and produce mucus, or phlegm. The mucus and inflamed bronchial tubes make you cough. You may have trouble breathing. Most cases of bronchitis are caused by viruses like those that cause colds. Antibiotics usually do not help and they may be harmful. Bronchitis usually develops rapidly and lasts about 2 to 3 weeks in otherwise healthy people. Follow-up care is a key part of your treatment and safety. Be sure to make and go to all appointments, and call your doctor if you are having problems. It's also a good idea to know your test results and keep a list of the medicines you take. How can you care for yourself at home? · Take all medicines exactly as prescribed. Call your doctor if you think you are having a problem with your medicine. · Get some extra rest. 
· Take an over-the-counter pain medicine, such as acetaminophen (Tylenol), ibuprofen (Advil, Motrin), or naproxen (Aleve) to reduce fever and relieve body aches. Read and follow all instructions on the label. · Do not take two or more pain medicines at the same time unless the doctor told you to. Many pain medicines have acetaminophen, which is Tylenol. Too much acetaminophen (Tylenol) can be harmful. · Take an over-the-counter cough medicine that contains dextromethorphan to help quiet a dry, hacking cough so that you can sleep. Avoid cough medicines that have more than one active ingredient. Read and follow all instructions on the label. · Breathe moist air from a humidifier, hot shower, or sink filled with hot water. The heat and moisture will thin mucus so you can cough it out. · Do not smoke. Smoking can make bronchitis worse. If you need help quitting, talk to your doctor about stop-smoking programs and medicines. These can increase your chances of quitting for good. When should you call for help? Call 911 anytime you think you may need emergency care. For example, call if: 
? · You have severe trouble breathing. ?Call your doctor now or seek immediate medical care if: 
? · You have new or worse trouble breathing. ? · You cough up dark brown or bloody mucus (sputum). ? · You have a new or higher fever. ? · You have a new rash. ? Watch closely for changes in your health, and be sure to contact your doctor if: 
? · You cough more deeply or more often, especially if you notice more mucus or a change in the color of your mucus. ? · You are not getting better as expected. Where can you learn more? Go to http://luana-catalina.info/. Enter H333 in the search box to learn more about \"Bronchitis: Care Instructions. \" 
 Current as of: May 12, 2017 Content Version: 11.4 © 3375-7690 Healthwise, [employer]. Care instructions adapted under license by [employer] (which disclaims liability or warranty for this information). If you have questions about a medical condition or this instruction, always ask your healthcare professional. [address] any warranty or liability for your use of this information. Introducing hospitals & HEALTH SERVICES! Lacey Amaral introduces Hubsphere patient portal. Now you can access parts of your medical record, email your doctor's office, and request medication refills online. 1. In your internet browser, go to [email or web address] 2. Click on the First Time User? Click Here link in the Sign In box. You will see the New Member Sign Up page. 3. Enter your Hubsphere Access Code exactly as it appears below. You will not need to use this code after youve completed the sign-up process. If you do not sign up before the expiration date, you must request a new code. · Hubsphere Access Code: [email or web address] Expires: 4/25/2018  2:30 PM 
 
4. Enter the last four digits of your Social Security Number (xxxx) and Date of Birth (mm/dd/yyyy) as indicated and click Submit. You will be taken to the next sign-up page. 5. Create a Hubsphere ID. This will be your Hubsphere login ID and cannot be changed, so think of one that is secure and easy to remember. 6. Create a Hubsphere password. You can change your password at any time. 7. Enter your Password Reset Question and Answer. This can be used at a later time if you forget your password. 8. Enter your e-mail address. You will receive e-mail notification when new information is available in [address] 9. Click Sign Up. You can now view and download portions of your medical record. 10. Click the Download Summary menu link to download a portable copy of your medical information. If you have questions, please visit the Frequently Asked Questions section of the [employer] website. Remember, [employer] is NOT to be used for urgent needs. For medical emergencies, dial 911. Now available from your iPhone and Android! Please provide this summary of care documentation to your next provider. Your primary care clinician is listed as Yogi Mayberry. If you have any questions after today's visit, please call [phone].

## 2018-05-16 ENCOUNTER — HOSPITAL ENCOUNTER (OUTPATIENT)
Dept: LAB | Age: 43
Discharge: HOME | End: 2018-05-16
Payer: COMMERCIAL

## 2018-05-16 ENCOUNTER — OFFICE VISIT (OUTPATIENT)
Dept: OBGYN CLINIC | Age: 43
End: 2018-05-16

## 2018-05-16 VITALS
HEIGHT: 63 IN | OXYGEN SATURATION: 100 % | RESPIRATION RATE: 18 BRPM | DIASTOLIC BLOOD PRESSURE: 71 MMHG | HEART RATE: 92 BPM | WEIGHT: 138 LBS | BODY MASS INDEX: 24.45 KG/M2 | SYSTOLIC BLOOD PRESSURE: 108 MMHG

## 2018-05-16 DIAGNOSIS — Z01.419 ENCOUNTER FOR WELL WOMAN EXAM WITH ROUTINE GYNECOLOGICAL EXAM: Primary | ICD-10-CM

## 2018-05-16 DIAGNOSIS — Z11.3 SCREEN FOR STD (SEXUALLY TRANSMITTED DISEASE): ICD-10-CM

## 2018-05-16 DIAGNOSIS — Z01.419 ENCOUNTER FOR WELL WOMAN EXAM WITH ROUTINE GYNECOLOGICAL EXAM: ICD-10-CM

## 2018-05-16 DIAGNOSIS — D25.9 UTERINE LEIOMYOMA, UNSPECIFIED LOCATION: ICD-10-CM

## 2018-05-16 PROCEDURE — 36415 COLL VENOUS BLD VENIPUNCTURE: CPT | Performed by: OBSTETRICS & GYNECOLOGY

## 2018-05-16 PROCEDURE — 88175 CYTOPATH C/V AUTO FLUID REDO: CPT | Performed by: OBSTETRICS & GYNECOLOGY

## 2018-05-16 PROCEDURE — 87491 CHLMYD TRACH DNA AMP PROBE: CPT | Performed by: OBSTETRICS & GYNECOLOGY

## 2018-05-16 PROCEDURE — 86803 HEPATITIS C AB TEST: CPT | Performed by: OBSTETRICS & GYNECOLOGY

## 2018-05-16 PROCEDURE — 87389 HIV-1 AG W/HIV-1&-2 AB AG IA: CPT | Performed by: OBSTETRICS & GYNECOLOGY

## 2018-05-16 PROCEDURE — 87624 HPV HI-RISK TYP POOLED RSLT: CPT | Performed by: OBSTETRICS & GYNECOLOGY

## 2018-05-16 PROCEDURE — 86780 TREPONEMA PALLIDUM: CPT | Performed by: OBSTETRICS & GYNECOLOGY

## 2018-05-16 PROCEDURE — 87340 HEPATITIS B SURFACE AG IA: CPT | Performed by: OBSTETRICS & GYNECOLOGY

## 2018-05-16 NOTE — PROGRESS NOTES
Subjective:   41 y.o. female for Well Woman Check. Patient's last menstrual period was 2018. Social History: single partner, contraception - none. Pertinent past medical hstory: no history of HTN, DVT, CAD, DM, liver disease, migraines or smoking. Current Outpatient Prescriptions   Medication Sig Dispense Refill    loratadine (CLARITIN) 10 mg tablet Take 10 mg by mouth daily.  Pheniramine-Phenyleph-Acetamin (THERAFLU FLU-SORE THROAT) 20- mg pwpk Take  by mouth.  benzonatate (TESSALON) 100 mg capsule Take 1 Cap by mouth three (3) times daily as needed for Cough. 15 Cap 0    cyanocobalamin (VITAMIN B12) 500 mcg tablet Take 500 mcg by mouth daily. No Known Allergies  Past Medical History:   Diagnosis Date    Anemia NEC as a child    iron deficiency     Breast lump 2017    Left Breast Lump    Fibroid      Past Surgical History:   Procedure Laterality Date    ABDOMEN SURGERY PROC UNLISTED  ? or     ob/gyn    HX  SECTION       Family History   Problem Relation Age of Onset    Hypertension Mother     No Known Problems Father      Social History   Substance Use Topics    Smoking status: Former Smoker     Packs/day: 0.09     Types: Cigarettes, Cigars     Quit date: 2007    Smokeless tobacco: Never Used      Comment: patient smokes maybe one cigarette a month. She smokes when stressed out.  Alcohol use 0.5 oz/week     1 Standard drinks or equivalent per week      Comment: socially (sometimes)        ROS:  Feeling well. No dyspnea or chest pain on exertion. No abdominal pain, change in bowel habits, black or bloody stools. No urinary tract symptoms. GYN ROS: normal menses, no abnormal bleeding, pelvic pain or discharge, no breast pain or new or enlarging lumps on self exam. No neurological complaints.     Objective:     Visit Vitals    /71 (BP 1 Location: Left arm, BP Patient Position: Sitting)    Pulse 92    Resp 18    Ht 5' 3\" (1.6 m)  Wt 138 lb (62.6 kg)    LMP 04/24/2018    SpO2 100%    BMI 24.45 kg/m2     The patient appears well, alert, oriented x 3, in no distress. ENT normal.  Neck supple. No adenopathy or thyromegaly. DIANA. Lungs are clear, good air entry, no wheezes, rhonchi or rales. S1 and S2 normal, no murmurs, regular rate and rhythm. Abdomen soft without tenderness, guarding, mass or organomegaly. Extremities show no edema, normal peripheral pulses. Neurological is normal, no focal findings.     BREAST EXAM: breasts appear normal, no suspicious masses, no skin or nipple changes or axillary nodes    PELVIC EXAM: normal external genitalia, vulva, vagina, cervix, uterus and adnexa, PAP: Pap smear done today, DNA probe for chlamydia and GC obtained, HPV test    Assessment/Plan:   well woman  STD screening  Mammogram done  pap smear  return annually or prn

## 2018-05-16 NOTE — PROGRESS NOTES
Correction to progress note of 5/16/18:  Uterus irregular and enlarged to 20 week size, non-tender. Pelvic ultrasound ordered. Will contact the patient with the ultrasound results.

## 2018-05-17 LAB
C TRACH RRNA SPEC QL NAA+PROBE: NEGATIVE
HBV SURFACE AG SER QL: <0.1 INDEX
HBV SURFACE AG SER QL: NEGATIVE
HCV AB SER IA-ACNC: 0.03 INDEX
HCV AB SERPL QL IA: NEGATIVE
HCV COMMENT,HCGAC: NORMAL
N GONORRHOEA RRNA SPEC QL NAA+PROBE: NEGATIVE
SPECIMEN SOURCE: NORMAL
T PALLIDUM AB SER QL IA: NEGATIVE
T VAGINALIS RRNA SPEC QL NAA+PROBE: NEGATIVE

## 2018-05-18 LAB
HIV 1+2 AB+HIV1 P24 AG SERPL QL IA: NONREACTIVE
HIV12 RESULT COMMENT, HHIVC: NORMAL

## 2018-05-31 ENCOUNTER — TELEPHONE (OUTPATIENT)
Dept: OBGYN CLINIC | Age: 43
End: 2018-05-31

## 2018-06-05 ENCOUNTER — HOSPITAL ENCOUNTER (OUTPATIENT)
Dept: ULTRASOUND IMAGING | Age: 43
Discharge: HOME | End: 2018-06-05
Attending: OBSTETRICS & GYNECOLOGY
Payer: COMMERCIAL

## 2018-06-05 DIAGNOSIS — D25.9 UTERINE LEIOMYOMA, UNSPECIFIED LOCATION: ICD-10-CM

## 2018-06-05 PROCEDURE — 76830 TRANSVAGINAL US NON-OB: CPT

## 2018-07-26 ENCOUNTER — TELEPHONE (OUTPATIENT)
Dept: OBGYN CLINIC | Age: 43
End: 2018-07-26

## 2018-07-26 NOTE — TELEPHONE ENCOUNTER
Patient called stating that she would like her results from May. She said if she do not answer the phone it is okay to leave message on her voicemail.

## 2019-03-25 ENCOUNTER — HOSPITAL ENCOUNTER (EMERGENCY)
Age: 44
Discharge: HOME | End: 2019-03-25
Attending: EMERGENCY MEDICINE
Payer: COMMERCIAL

## 2019-03-25 VITALS
WEIGHT: 138 LBS | RESPIRATION RATE: 17 BRPM | BODY MASS INDEX: 24.45 KG/M2 | DIASTOLIC BLOOD PRESSURE: 75 MMHG | HEART RATE: 79 BPM | HEIGHT: 63 IN | TEMPERATURE: 97.8 F | SYSTOLIC BLOOD PRESSURE: 115 MMHG | OXYGEN SATURATION: 100 %

## 2019-03-25 DIAGNOSIS — H61.21 IMPACTED CERUMEN OF RIGHT EAR: Primary | ICD-10-CM

## 2019-03-25 PROCEDURE — 99282 EMERGENCY DEPT VISIT SF MDM: CPT

## 2019-03-25 PROCEDURE — 76010010392 HC REMOVAL IMPACTED WAX IRRIGATION/LVG UNI

## 2019-03-25 PROCEDURE — 74011250637 HC RX REV CODE- 250/637: Performed by: EMERGENCY MEDICINE

## 2019-03-25 RX ADMIN — CARBAMIDE PEROXIDE 6.5% OTIC SOLN 5 DROP: 6.5 SOLUTION at 15:20

## 2019-03-25 NOTE — DISCHARGE INSTRUCTIONS
Patient Education        Earwax Blockage: Care Instructions  Your Care Instructions    Earwax is a natural substance that protects the ear canal. Normally, earwax drains from the ears and does not cause problems. Sometimes earwax builds up and hardens. Earwax blockage (also called cerumen impaction) can cause some loss of hearing and pain. When wax is tightly packed, you will need to have your doctor remove it. Follow-up care is a key part of your treatment and safety. Be sure to make and go to all appointments, and call your doctor if you are having problems. It's also a good idea to know your test results and keep a list of the medicines you take. How can you care for yourself at home? · Do not try to remove earwax with cotton swabs, fingers, or other objects. This can make the blockage worse and damage the eardrum. · If your doctor recommends that you try to remove earwax at home:  ? Soften and loosen the earwax with warm mineral oil. You also can try hydrogen peroxide mixed with an equal amount of room temperature water. Place 2 drops of the fluid, warmed to body temperature, in the ear two times a day for up to 5 days. ? Once the wax is loose and soft, all that is usually needed to remove it from the ear canal is a gentle, warm shower. Direct the water into the ear, then tip your head to let the earwax drain out. Dry your ear thoroughly with a hair dryer set on low. Hold the dryer several inches from your ear. ? If the warm mineral oil and shower do not work, use an over-the-counter wax softener. Read and follow all instructions on the label. After using the wax softener, use an ear syringe to gently flush the ear. Make sure the flushing solution is body temperature. Cool or hot fluids in the ear can cause dizziness. When should you call for help?   Call your doctor now or seek immediate medical care if:    · Pus or blood drains from your ear.     · Your ears are ringing or feel full.     · You have a loss of hearing.    Watch closely for changes in your health, and be sure to contact your doctor if:    · You have pain or reduced hearing after 1 week of home treatment.     · You have any new symptoms, such as nausea or balance problems. Where can you learn more? Go to http://luana-catalina.info/. Enter V920 in the search box to learn more about \"Earwax Blockage: Care Instructions. \"  Current as of: September 23, 2018  Content Version: 11.9  © 0531-9232 [employer]. Care instructions adapted under license by [employer] (which disclaims liability or warranty for this information). If you have questions about a medical condition or this instruction, always ask your healthcare professional. [address] any warranty or liability for your use of this information.

## 2019-03-25 NOTE — ED PROVIDER NOTES
EMERGENCY DEPARTMENT HISTORY AND PHYSICAL EXAM 
 
Date: 3/25/2019 Patient Name: Stephan Ro History of Presenting Illness Chief Complaint Patient presents with  Ear Pain History Provided By: Patient Additional History (Context): Stephan Ro is a 41 y.o. female with No significant past medical history who presents with bilateral ear pain and ear fullness secondary to cerumen impaction. Patient does have a history of cerumen impaction had been cleaned professionally last year. Uses Q-tips still to clean out ears. Has tried Debrox as well as peroxide. Has IUD. PCP: Bj Gunn MD 
 
 
 
Past History Past Medical History: 
Past Medical History:  
Diagnosis Date  Anemia NEC as a child  
 iron deficiency  Breast lump 2017 Left Breast Lump  Fibroid Past Surgical History: 
Past Surgical History:  
Procedure Laterality Date  ABDOMEN SURGERY PROC UNLISTED  ? or   
 ob/gyn  HX  SECTION   Family History: 
Family History Problem Relation Age of Onset  Hypertension Mother  No Known Problems Father Social History: 
Social History Tobacco Use  Smoking status: Former Smoker Packs/day: 0.09 Types: Cigarettes, Cigars Last attempt to quit: 2007 Years since quittin.7  Smokeless tobacco: Never Used  Tobacco comment: patient smokes maybe one cigarette a month. She smokes when stressed out. Substance Use Topics  Alcohol use: Yes Alcohol/week: 0.5 oz Types: 1 Standard drinks or equivalent per week Comment: socially (sometimes)  Drug use: No  
 
 
Allergies: 
No Known Allergies Review of Systems Review of Systems Constitutional: Negative for fever. HENT: Positive for ear discharge and ear pain. Negative for congestion. All Other Systems Negative Physical Exam  
 
Vitals:  
 19 1437 BP: 115/75 Pulse: 79 Resp: 17  
 Temp: 97.8 °F (36.6 °C) SpO2: 100% Weight: 62.6 kg (138 lb) Height: 5' 3\" (1.6 m) Physical Exam  
Constitutional: She is oriented to person, place, and time. She appears well-developed. HENT:  
Head: Normocephalic and atraumatic. Bilateral TM's obscured w/cerumen. No tragal or mastoid TTP. Eyes: Pupils are equal, round, and reactive to light. Neck: No JVD present. No tracheal deviation present. No thyromegaly present. Cardiovascular: Normal rate, regular rhythm and normal heart sounds. Exam reveals no gallop and no friction rub. No murmur heard. Pulmonary/Chest: Effort normal and breath sounds normal. No stridor. No respiratory distress. She has no wheezes. She has no rales. She exhibits no tenderness. Abdominal: Soft. She exhibits no distension and no mass. There is no tenderness. There is no rebound and no guarding. Musculoskeletal: She exhibits no edema or tenderness. Lymphadenopathy:  
  She has no cervical adenopathy. Neurological: She is alert and oriented to person, place, and time. Skin: Skin is warm and dry. No rash noted. No erythema. No pallor. Psychiatric: She has a normal mood and affect. Her behavior is normal. Thought content normal.  
Nursing note and vitals reviewed. Diagnostic Study Results Labs - No results found for this or any previous visit (from the past 12 hour(s)). Radiologic Studies - No orders to display CT Results  (Last 48 hours) None CXR Results  (Last 48 hours) None Medical Decision Making I am the first provider for this patient. I reviewed the vital signs, available nursing notes, past medical history, past surgical history, family history and social history. Vital Signs-Reviewed the patient's vital signs. Records Reviewed: Nursing Notes Procedures: 
Procedures Provider Notes (Medical Decision Making): Mild improvement in ear cerumen removal irrigation. Gave patient cerumen removal stick as well as remainder of Debrox solution. Give ENT referral.  Patient says she feels somewhat better. TM still not visualized well on the right. DC home. MED RECONCILIATION: 
No current facility-administered medications for this encounter. No current outpatient medications on file. Disposition: 
home DISCHARGE NOTE:  
4:11 PM 
 
Pt has been reexamined. Patient has no new complaints, changes, or physical findings. Care plan outlined and precautions discussed. Results of exam were reviewed with the patient. All medications were reviewed with the patient; will d/c home with debrox. All of pt's questions and concerns were addressed. Patient was instructed and agrees to follow up with ENT, PCP, as well as to return to the ED upon further deterioration. Patient is ready to go home. Follow-up Information Follow up With Specialties Details Why Contact Info Bryce Rasmussen MD Otolaryngology, Surgery Schedule an appointment as soon as possible for a visit in 1 week  [place] [address] [address] [facility] 
[phone] Xin Lema MD Internal Medicine Schedule an appointment as soon as possible for a visit in 1 week As needed [address] [facility] [phone] 
[phone] [facility] EMERGENCY DEPT Emergency Medicine  If symptoms worsen return immediately [address] 
[phone] There are no discharge medications for this patient. Diagnosis Clinical Impression: 1. Impacted cerumen of right ear

## 2019-03-27 ENCOUNTER — TELEPHONE (OUTPATIENT)
Dept: INTERNAL MEDICINE CLINIC | Age: 44
End: 2019-03-27

## 2019-03-27 NOTE — TELEPHONE ENCOUNTER
Please call pt to set up a 30 minute appt. For [facility] ER f/u on 3/25/19 for right ear pain.

## 2019-03-27 NOTE — TELEPHONE ENCOUNTER
Pt contacted at home number. 2 pt identifiers confirmed. Scheduled follow up appointment on 4/4/19 @ 4:30pm.      Pt verbalized understanding. No other questions at this time.

## 2019-04-16 ENCOUNTER — OFFICE VISIT (OUTPATIENT)
Dept: OBGYN CLINIC | Age: 44
End: 2019-04-16

## 2019-04-16 ENCOUNTER — HOSPITAL ENCOUNTER (OUTPATIENT)
Dept: LAB | Age: 44
Discharge: HOME | End: 2019-04-16
Payer: COMMERCIAL

## 2019-04-16 VITALS
DIASTOLIC BLOOD PRESSURE: 61 MMHG | BODY MASS INDEX: 25.87 KG/M2 | TEMPERATURE: 98.5 F | HEART RATE: 71 BPM | HEIGHT: 63 IN | WEIGHT: 146 LBS | SYSTOLIC BLOOD PRESSURE: 106 MMHG

## 2019-04-16 DIAGNOSIS — Z11.3 SCREEN FOR STD (SEXUALLY TRANSMITTED DISEASE): Primary | ICD-10-CM

## 2019-04-16 DIAGNOSIS — Z11.3 SCREEN FOR STD (SEXUALLY TRANSMITTED DISEASE): ICD-10-CM

## 2019-04-16 LAB — T PALLIDUM AB SER QL IA: NONREACTIVE

## 2019-04-16 PROCEDURE — 36415 COLL VENOUS BLD VENIPUNCTURE: CPT

## 2019-04-16 PROCEDURE — 87389 HIV-1 AG W/HIV-1&-2 AB AG IA: CPT

## 2019-04-16 PROCEDURE — 87340 HEPATITIS B SURFACE AG IA: CPT

## 2019-04-16 PROCEDURE — 86803 HEPATITIS C AB TEST: CPT

## 2019-04-16 PROCEDURE — 87491 CHLMYD TRACH DNA AMP PROBE: CPT

## 2019-04-16 PROCEDURE — 86780 TREPONEMA PALLIDUM: CPT

## 2019-04-16 NOTE — PROGRESS NOTES
Subjective:      Patient presents for STD screening only. She has no complaints and denies abnormal vaginal discharge. She states that her periods are regular. No Known Allergies  Past Medical History:   Diagnosis Date    Anemia NEC as a child    iron deficiency     Breast lump 2017    Left Breast Lump    Fibroid      Past Surgical History:   Procedure Laterality Date    ABDOMEN SURGERY PROC UNLISTED  ? or     ob/gyn    HX  SECTION       Family History   Problem Relation Age of Onset    Hypertension Mother     No Known Problems Father      Social History     Tobacco Use    Smoking status: Former Smoker     Packs/day: 0.09     Types: Cigarettes, Cigars     Last attempt to quit: 2007     Years since quittin.8    Smokeless tobacco: Never Used    Tobacco comment: patient smokes maybe one cigarette a month. She smokes when stressed out. Substance Use Topics    Alcohol use: Yes     Alcohol/week: 0.5 oz     Types: 1 Standard drinks or equivalent per week     Comment: socially (sometimes)      Review of Systems    A comprehensive review of systems was negative except for that written in the HPI. Objective:     Visit Vitals  /61 (BP 1 Location: Left arm, BP Patient Position: Sitting)   Pulse 71   Temp 98.5 °F (36.9 °C) (Oral)   Ht 5' 3\" (1.6 m)   Wt 146 lb (66.2 kg)   BMI 25.86 kg/m²     General appearance: alert, cooperative, no distress, appears stated age  Pelvic: External genitalia normal, Vagina normal without discharge, cervix normal in appearance, no CMT, rectovaginal septum normal        Assessment/Plan:     STD screening  Follow up 1 month for annual exam.  Plan of care discussed. Patient expressed understanding.

## 2019-04-17 LAB
C TRACH RRNA SPEC QL NAA+PROBE: NEGATIVE
HBV SURFACE AG SER QL: 0.27 INDEX
HBV SURFACE AG SER QL: NEGATIVE
HCV AB SER IA-ACNC: 0.05 INDEX
HCV AB SERPL QL IA: NEGATIVE
HCV COMMENT,HCGAC: NORMAL
N GONORRHOEA RRNA SPEC QL NAA+PROBE: NEGATIVE
SPECIMEN SOURCE: NORMAL
T VAGINALIS RRNA SPEC QL NAA+PROBE: NEGATIVE

## 2019-04-18 LAB
HIV 1+2 AB+HIV1 P24 AG SERPL QL IA: NONREACTIVE
HIV12 RESULT COMMENT, HHIVC: NORMAL

## 2019-04-23 ENCOUNTER — TELEPHONE (OUTPATIENT)
Dept: OBGYN CLINIC | Age: 44
End: 2019-04-23

## 2019-07-02 ENCOUNTER — HOSPITAL ENCOUNTER (EMERGENCY)
Age: 44
Discharge: HOME | End: 2019-07-03
Attending: EMERGENCY MEDICINE
Payer: COMMERCIAL

## 2019-07-02 DIAGNOSIS — D25.9 UTERINE LEIOMYOMA, UNSPECIFIED LOCATION: Primary | ICD-10-CM

## 2019-07-02 DIAGNOSIS — R10.84 ABDOMINAL PAIN, GENERALIZED: ICD-10-CM

## 2019-07-02 DIAGNOSIS — D64.9 ANEMIA, UNSPECIFIED TYPE: ICD-10-CM

## 2019-07-02 PROCEDURE — 99284 EMERGENCY DEPT VISIT MOD MDM: CPT

## 2019-07-03 VITALS
OXYGEN SATURATION: 100 % | TEMPERATURE: 98.1 F | BODY MASS INDEX: 24.45 KG/M2 | WEIGHT: 138 LBS | RESPIRATION RATE: 15 BRPM | SYSTOLIC BLOOD PRESSURE: 116 MMHG | DIASTOLIC BLOOD PRESSURE: 70 MMHG | HEART RATE: 70 BPM

## 2019-07-03 LAB
ANION GAP SERPL CALC-SCNC: 8 MMOL/L (ref 3–18)
APPEARANCE UR: CLEAR
BACTERIA URNS QL MICRO: ABNORMAL /HPF
BASOPHILS # BLD: 0 K/UL (ref 0–0.1)
BASOPHILS NFR BLD: 0 % (ref 0–2)
BILIRUB UR QL: NEGATIVE
BUN SERPL-MCNC: 8 MG/DL (ref 7–18)
BUN/CREAT SERPL: 10 (ref 12–20)
C TRACH RRNA SPEC QL NAA+PROBE: NEGATIVE
CALCIUM SERPL-MCNC: 8.9 MG/DL (ref 8.5–10.1)
CHLORIDE SERPL-SCNC: 106 MMOL/L (ref 100–108)
CO2 SERPL-SCNC: 26 MMOL/L (ref 21–32)
COLOR UR: YELLOW
CREAT SERPL-MCNC: 0.8 MG/DL (ref 0.6–1.3)
DIFFERENTIAL METHOD BLD: ABNORMAL
EOSINOPHIL # BLD: 0.1 K/UL (ref 0–0.4)
EOSINOPHIL NFR BLD: 1 % (ref 0–5)
EPITH CASTS URNS QL MICRO: ABNORMAL /LPF (ref 0–5)
ERYTHROCYTE [DISTWIDTH] IN BLOOD BY AUTOMATED COUNT: 22.2 % (ref 11.6–14.5)
GLUCOSE SERPL-MCNC: 84 MG/DL (ref 74–99)
GLUCOSE UR STRIP.AUTO-MCNC: NEGATIVE MG/DL
HCG UR QL: NEGATIVE
HCT VFR BLD AUTO: 24.7 % (ref 35–45)
HGB BLD-MCNC: 7.2 G/DL (ref 12–16)
HGB UR QL STRIP: ABNORMAL
KETONES UR QL STRIP.AUTO: NEGATIVE MG/DL
LEUKOCYTE ESTERASE UR QL STRIP.AUTO: NEGATIVE
LYMPHOCYTES # BLD: 2 K/UL (ref 0.9–3.6)
LYMPHOCYTES NFR BLD: 33 % (ref 21–52)
MCH RBC QN AUTO: 15.5 PG (ref 24–34)
MCHC RBC AUTO-ENTMCNC: 29.1 G/DL (ref 31–37)
MCV RBC AUTO: 53.2 FL (ref 74–97)
MONOCYTES # BLD: 0.7 K/UL (ref 0.05–1.2)
MONOCYTES NFR BLD: 12 % (ref 3–10)
N GONORRHOEA RRNA SPEC QL NAA+PROBE: NEGATIVE
NEUTS SEG # BLD: 3.3 K/UL (ref 1.8–8)
NEUTS SEG NFR BLD: 54 % (ref 40–73)
NITRITE UR QL STRIP.AUTO: NEGATIVE
PH UR STRIP: 6.5 [PH] (ref 5–8)
PLATELET # BLD AUTO: 483 K/UL (ref 135–420)
PLATELET COMMENTS,PCOM: ABNORMAL
POTASSIUM SERPL-SCNC: 4.9 MMOL/L (ref 3.5–5.5)
PROT UR STRIP-MCNC: NEGATIVE MG/DL
RBC # BLD AUTO: 4.64 M/UL (ref 4.2–5.3)
RBC #/AREA URNS HPF: ABNORMAL /HPF (ref 0–5)
RBC MORPH BLD: ABNORMAL
SERVICE CMNT-IMP: NORMAL
SODIUM SERPL-SCNC: 140 MMOL/L (ref 136–145)
SP GR UR REFRACTOMETRY: 1.01 (ref 1–1.03)
SPECIMEN SOURCE: NORMAL
UROBILINOGEN UR QL STRIP.AUTO: 0.2 EU/DL (ref 0.2–1)
WBC # BLD AUTO: 6.1 K/UL (ref 4.6–13.2)
WBC URNS QL MICRO: ABNORMAL /HPF (ref 0–4)
WET PREP GENITAL: NORMAL

## 2019-07-03 PROCEDURE — 81001 URINALYSIS AUTO W/SCOPE: CPT

## 2019-07-03 PROCEDURE — 85025 COMPLETE CBC W/AUTO DIFF WBC: CPT

## 2019-07-03 PROCEDURE — 80048 BASIC METABOLIC PNL TOTAL CA: CPT

## 2019-07-03 PROCEDURE — 81025 URINE PREGNANCY TEST: CPT

## 2019-07-03 PROCEDURE — 87210 SMEAR WET MOUNT SALINE/INK: CPT

## 2019-07-03 PROCEDURE — 87491 CHLMYD TRACH DNA AMP PROBE: CPT

## 2019-07-03 RX ORDER — NAPROXEN 500 MG/1
500 TABLET ORAL 2 TIMES DAILY WITH MEALS
Qty: 20 TAB | Refills: 0 | Status: SHIPPED | OUTPATIENT
Start: 2019-07-03 | End: 2019-07-13

## 2019-07-03 NOTE — ED TRIAGE NOTES
Pt with nonspecific complaint. States \"my period keeps going off and then on again and then I feel bad then I dont. Maybe my pressure is up maybe it isnt. I dont know. \"  Denies pain

## 2019-07-03 NOTE — ED NOTES
Patient discharged. Condition stable. Patient discharged home. Patient education was completed: Yes  Education taught to : Patient  Teaching method used was discussion and handout. Understanding of teaching was good.   1 RX

## 2019-07-03 NOTE — ED NOTES
PT A&OX4, patent airway, non-labored breathing, PERRLA, skin warm/dry, vague complaints of feeling \"like my pressure is down and period is not right\".

## 2019-07-03 NOTE — ED PROVIDER NOTES
HPI     44F with hx fibroids presents with intermittent diffuse abd pain since this afternoon. Pt states she just didn't feel right. Also reports abnormal period this month bleeding for a couple days on and then off. Past Medical History:   Diagnosis Date    Anemia NEC as a child    iron deficiency     Breast lump 2017    Left Breast Lump    Fibroid        Past Surgical History:   Procedure Laterality Date    ABDOMEN SURGERY PROC UNLISTED  ?2005 or 2006    ob/gyn    HX  SECTION  2008         Family History:   Problem Relation Age of Onset    Hypertension Mother     No Known Problems Father        Social History     Socioeconomic History    Marital status: SINGLE     Spouse name: Not on file    Number of children: Not on file    Years of education: Not on file    Highest education level: Not on file   Occupational History    Not on file   Social Needs    Financial resource strain: Not on file    Food insecurity:     Worry: Not on file     Inability: Not on file    Transportation needs:     Medical: Not on file     Non-medical: Not on file   Tobacco Use    Smoking status: Former Smoker     Packs/day: 0.09     Types: Cigarettes, Cigars     Last attempt to quit: 2007     Years since quittin.0    Smokeless tobacco: Never Used    Tobacco comment: patient smokes maybe one cigarette a month. She smokes when stressed out. Substance and Sexual Activity    Alcohol use:  Yes     Alcohol/week: 0.5 oz     Types: 1 Standard drinks or equivalent per week     Comment: socially (sometimes)    Drug use: No    Sexual activity: Yes     Partners: Male   Lifestyle    Physical activity:     Days per week: Not on file     Minutes per session: Not on file    Stress: Not on file   Relationships    Social connections:     Talks on phone: Not on file     Gets together: Not on file     Attends Scientologist service: Not on file     Active member of club or organization: Not on file     Attends meetings of clubs or organizations: Not on file     Relationship status: Not on file    Intimate partner violence:     Fear of current or ex partner: Not on file     Emotionally abused: Not on file     Physically abused: Not on file     Forced sexual activity: Not on file   Other Topics Concern    Not on file   Social History Narrative    Not on file         ALLERGIES: Patient has no known allergies. Review of Systems   Constitutional: Negative for chills and fever. HENT: Negative for ear pain and sore throat. Eyes: Negative for pain and visual disturbance. Respiratory: Negative for cough and shortness of breath. Cardiovascular: Negative for chest pain and palpitations. Gastrointestinal: Positive for abdominal pain and nausea. Negative for diarrhea and vomiting. Genitourinary: Positive for vaginal bleeding. Negative for flank pain. Musculoskeletal: Negative for back pain and neck pain. Neurological: Negative for syncope and headaches. Psychiatric/Behavioral: Negative for agitation. The patient is not nervous/anxious. Vitals:    07/02/19 2327 07/03/19 0335 07/03/19 0409   BP: 112/76 114/72 116/70   Pulse: 70 72 70   Resp: 16 16 15   Temp: 98 °F (36.7 °C) 98 °F (36.7 °C) 98.1 °F (36.7 °C)   SpO2: 100% 100%    Weight: 62.6 kg (138 lb)              Physical Exam   Constitutional: She is oriented to person, place, and time. She appears well-developed and well-nourished. No distress. HENT:   Head: Normocephalic and atraumatic. Mouth/Throat: Oropharynx is clear and moist.   Eyes: Pupils are equal, round, and reactive to light. EOM are normal. No scleral icterus. Neck: Neck supple. No tracheal deviation present. Cardiovascular: Normal rate, regular rhythm and intact distal pulses. No murmur heard. Pulmonary/Chest: Effort normal and breath sounds normal. No respiratory distress. Abdominal: Soft. She exhibits mass (palpable large lower abd mass, nontender). There is no tenderness. Genitourinary: Vagina normal. No vaginal discharge found. Genitourinary Comments: No discharge or blood noted, large hard uterine mass consistent with fibroid, no cervical motion or adnexal TTP. Musculoskeletal: Normal range of motion. She exhibits no edema or deformity. Neurological: She is alert and oriented to person, place, and time. No cranial nerve deficit. No gross neuro deficit   Skin: Skin is warm and dry. Capillary refill takes less than 2 seconds. No rash noted. She is not diaphoretic. Psychiatric: She has a normal mood and affect. Nursing note and vitals reviewed. Labs Reviewed   CBC WITH AUTOMATED DIFF - Abnormal; Notable for the following components:       Result Value    HGB 7.2 (*)     HCT 24.7 (*)     MCV 53.2 (*)     MCH 15.5 (*)     MCHC 29.1 (*)     RDW 22.2 (*)     PLATELET 797 (*)     MONOCYTES 12 (*)     All other components within normal limits   METABOLIC PANEL, BASIC - Abnormal; Notable for the following components:    BUN/Creatinine ratio 10 (*)     All other components within normal limits   URINALYSIS W/ RFLX MICROSCOPIC - Abnormal; Notable for the following components:    Blood SMALL (*)     All other components within normal limits   URINE MICROSCOPIC ONLY - Abnormal; Notable for the following components:    Bacteria FEW (*)     All other components within normal limits   WET PREP   CHLAMYDIA/NEISSERIA AMPLIFICATION   HCG URINE, QL. - POC     No orders to display         MDM        General, vague unwell feeling with some lower abd pain, now feeling better, normal vitals. Benign abd exam with fibroids palpated externally and internally on bimanual exam, likely cause of irregular bleeding and contributing to symptomatic anemia found on work-up here. Pt declines tx, feeling much better and plans to follow with her gyn. No complaints on reevaluation. Procedures    Dispo: home    ICD-10-CM ICD-9-CM   1. Uterine leiomyoma, unspecified location D25.9 218.9   2.  Anemia, unspecified type D64.9 285.9   3.  Abdominal pain, generalized R10.84 789.07

## 2020-06-09 ENCOUNTER — HOSPITAL ENCOUNTER (OUTPATIENT)
Dept: LAB | Age: 45
Discharge: HOME | End: 2020-06-09
Payer: COMMERCIAL

## 2020-06-09 ENCOUNTER — OFFICE VISIT (OUTPATIENT)
Dept: OBGYN CLINIC | Age: 45
End: 2020-06-09

## 2020-06-09 VITALS
SYSTOLIC BLOOD PRESSURE: 116 MMHG | RESPIRATION RATE: 20 BRPM | WEIGHT: 155.6 LBS | DIASTOLIC BLOOD PRESSURE: 66 MMHG | OXYGEN SATURATION: 100 % | HEART RATE: 85 BPM | TEMPERATURE: 98.6 F | BODY MASS INDEX: 27.57 KG/M2 | HEIGHT: 63 IN

## 2020-06-09 DIAGNOSIS — N93.9 ABNORMAL UTERINE BLEEDING: Primary | ICD-10-CM

## 2020-06-09 DIAGNOSIS — N93.9 ABNORMAL UTERINE BLEEDING: ICD-10-CM

## 2020-06-09 LAB
HCG URINE, QL. (POC): NEGATIVE
VALID INTERNAL CONTROL?: YES

## 2020-06-09 PROCEDURE — 87798 DETECT AGENT NOS DNA AMP: CPT

## 2020-06-09 RX ORDER — NORGESTIMATE AND ETHINYL ESTRADIOL 0.25-0.035
1 KIT ORAL DAILY
Qty: 4 PACKAGE | Refills: 3 | Status: SHIPPED | OUTPATIENT
Start: 2020-06-09 | End: 2020-06-09 | Stop reason: CLARIF

## 2020-06-09 RX ORDER — NORGESTIMATE AND ETHINYL ESTRADIOL 0.25-0.035
1 KIT ORAL DAILY
Qty: 3 PACKAGE | Refills: 4 | Status: SHIPPED | OUTPATIENT
Start: 2020-06-09 | End: 2020-08-13

## 2020-06-09 NOTE — PROGRESS NOTES
1. Have you been to the ER, urgent care clinic since your last visit? Hospitalized since your last visit? No    2. Have you seen or consulted any other health care providers outside of the [address] [place] [address] since your last visit? Include any pap smears or colon screening.  No   Visit Vitals  /66   Pulse 85   Temp 98.6 °F (37 °C) (Oral)   Resp 20   Ht 5' 3\" (1.6 m)   Wt 155 lb 9.6 oz (70.6 kg)   LMP 05/16/2020   SpO2 100%   BMI 27.56 kg/m²

## 2020-06-09 NOTE — PROGRESS NOTES
Mary Nolasco is a 39 y.o. female who presents today for the following:  Chief Complaint   Patient presents with    Menstrual Problem    Irregular Menses       HPI  Abnormal uterine bleeding    No Known Allergies    Current Outpatient Medications   Medication Sig    norgestimate-ethinyl estradioL (ORTHO-CYCLEN, SPRINTEC) 0.25-35 mg-mcg tab Take 1 Tab by mouth daily. No current facility-administered medications for this visit. Immunization History   Administered Date(s) Administered    Influenza Vaccine (Quad) PF 2016    Tdap 2015       Past Medical History:   Diagnosis Date    Anemia NEC as a child    iron deficiency     Breast lump 2017    Left Breast Lump    Fibroid        Past Surgical History:   Procedure Laterality Date    ABDOMEN SURGERY PROC UNLISTED  ? or     ob/gyn    HX  SECTION         Social History     Socioeconomic History    Marital status: SINGLE     Spouse name: Not on file    Number of children: Not on file    Years of education: Not on file    Highest education level: Not on file   Occupational History    Not on file   Social Needs    Financial resource strain: Not on file    Food insecurity     Worry: Not on file     Inability: Not on file    Transportation needs     Medical: Not on file     Non-medical: Not on file   Tobacco Use    Smoking status: Former Smoker     Packs/day: 0.09     Types: Cigarettes, Cigars     Last attempt to quit: 2007     Years since quittin.9    Smokeless tobacco: Never Used    Tobacco comment: patient smokes maybe one cigarette a month. She smokes when stressed out. Substance and Sexual Activity    Alcohol use:  Yes     Alcohol/week: 0.8 standard drinks     Types: 1 Standard drinks or equivalent per week     Comment: socially (sometimes)    Drug use: No    Sexual activity: Yes     Partners: Male   Lifestyle    Physical activity     Days per week: Not on file     Minutes per session: Not on file    Stress: Not on file   Relationships    Social connections     Talks on phone: Not on file     Gets together: Not on file     Attends Shinto service: Not on file     Active member of club or organization: Not on file     Attends meetings of clubs or organizations: Not on file     Relationship status: Not on file    Intimate partner violence     Fear of current or ex partner: Not on file     Emotionally abused: Not on file     Physically abused: Not on file     Forced sexual activity: Not on file   Other Topics Concern    Not on file   Social History Narrative    Not on file       Family History   Problem Relation Age of Onset    Hypertension Mother     No Known Problems Father        Review of Systems:    All other systems were reviewed and are negative. Physical Exam  Genitourinary:      Pelvic exam was performed with patient in the lithotomy position. Vulva, urethra, vagina, cervix, right adnexa and left adnexa normal.      Uterus is enlarged. Uterus is anteverted. /66   Pulse 85   Temp 98.6 °F (37 °C) (Oral)   Resp 20   Ht 5' 3\" (1.6 m)   Wt 155 lb 9.6 oz (70.6 kg)   LMP 05/16/2020   SpO2 100%   BMI 27.56 kg/m²       1. Abnormal uterine bleeding    - AMB POC URINE PREGNANCY TEST, VISUAL COLOR COMPARISON  - norgestimate-ethinyl estradioL (ORTHO-CYCLEN, SPRINTEC) 0.25-35 mg-mcg tab; Take 1 Tab by mouth daily. Dispense: 3 Package; Refill: 4        Follow Up: Will call patient with results.  Pregnancy test was negative today    Anthony Chen MD

## 2020-06-16 LAB
A VAGINAE DNA VAG QL NAA+PROBE: NORMAL SCORE
BVAB2 DNA VAG QL NAA+PROBE: NORMAL SCORE
C ALBICANS DNA VAG QL NAA+PROBE: NEGATIVE
C GLABRATA DNA VAG QL NAA+PROBE: NEGATIVE
C TRACH RRNA SPEC QL NAA+PROBE: NEGATIVE
MEGA1 DNA VAG QL NAA+PROBE: NORMAL SCORE
N GONORRHOEA RRNA SPEC QL NAA+PROBE: NEGATIVE
SPECIMEN SOURCE: NORMAL
T VAGINALIS RRNA SPEC QL NAA+PROBE: NEGATIVE

## 2020-07-15 ENCOUNTER — HOSPITAL ENCOUNTER (OUTPATIENT)
Dept: LAB | Age: 45
Discharge: HOME | End: 2020-07-15
Payer: COMMERCIAL

## 2020-07-15 ENCOUNTER — OFFICE VISIT (OUTPATIENT)
Dept: OBGYN CLINIC | Age: 45
End: 2020-07-15

## 2020-07-15 VITALS
HEART RATE: 99 BPM | WEIGHT: 148 LBS | BODY MASS INDEX: 26.22 KG/M2 | HEIGHT: 63 IN | RESPIRATION RATE: 18 BRPM | DIASTOLIC BLOOD PRESSURE: 81 MMHG | SYSTOLIC BLOOD PRESSURE: 121 MMHG | TEMPERATURE: 98.8 F

## 2020-07-15 DIAGNOSIS — N93.9 ABNORMAL UTERINE BLEEDING: Primary | ICD-10-CM

## 2020-07-15 DIAGNOSIS — D50.0 CHRONIC BLOOD LOSS ANEMIA: ICD-10-CM

## 2020-07-15 DIAGNOSIS — N93.9 ABNORMAL UTERINE BLEEDING: ICD-10-CM

## 2020-07-15 LAB
ERYTHROCYTE [DISTWIDTH] IN BLOOD BY AUTOMATED COUNT: 23.6 % (ref 11.6–14.5)
HCT VFR BLD AUTO: 19.6 % (ref 35–45)
HGB BLD-MCNC: 5.4 G/DL (ref 12–16)
MCH RBC QN AUTO: 15.9 PG (ref 24–34)
MCHC RBC AUTO-ENTMCNC: 27.6 G/DL (ref 31–37)
MCV RBC AUTO: 57.8 FL (ref 74–97)
PLATELET # BLD AUTO: 493 K/UL (ref 135–420)
RBC # BLD AUTO: 3.39 M/UL (ref 4.2–5.3)
WBC # BLD AUTO: 6 K/UL (ref 4.6–13.2)

## 2020-07-15 PROCEDURE — 36415 COLL VENOUS BLD VENIPUNCTURE: CPT

## 2020-07-15 PROCEDURE — 85027 COMPLETE CBC AUTOMATED: CPT

## 2020-07-15 RX ORDER — TRANEXAMIC ACID 650 1/1
1300 TABLET ORAL 3 TIMES DAILY
Qty: 30 TAB | Refills: 0 | Status: SHIPPED | OUTPATIENT
Start: 2020-07-15 | End: 2020-08-13

## 2020-07-15 NOTE — PROGRESS NOTES
25 minutes spent with patient of which greater than 50 percent spent in counseling in regards to abnormal uterine bleeding and h/o fibroid uterus. Patient is not tolerating OCPs that she is currently on. She also states that she has been craving ice. Will prescribe Lysteda today will also check hemoglobin. Pt. Informed to stop her birth control. Pt. To return for a pelvic transvaginal ultrasound and next steps.

## 2020-07-17 ENCOUNTER — DOCUMENTATION ONLY (OUTPATIENT)
Dept: OBGYN CLINIC | Age: 45
End: 2020-07-17

## 2020-07-17 ENCOUNTER — OFFICE VISIT (OUTPATIENT)
Dept: OBGYN CLINIC | Age: 45
End: 2020-07-17

## 2020-07-17 ENCOUNTER — HOSPITAL ENCOUNTER (EMERGENCY)
Age: 45
Discharge: HOME | End: 2020-07-18
Attending: EMERGENCY MEDICINE
Payer: COMMERCIAL

## 2020-07-17 VITALS
DIASTOLIC BLOOD PRESSURE: 71 MMHG | HEIGHT: 63 IN | WEIGHT: 148 LBS | TEMPERATURE: 98.7 F | SYSTOLIC BLOOD PRESSURE: 115 MMHG | HEART RATE: 84 BPM | BODY MASS INDEX: 26.22 KG/M2 | OXYGEN SATURATION: 100 % | RESPIRATION RATE: 29 BRPM

## 2020-07-17 DIAGNOSIS — D64.9 ANEMIA, UNSPECIFIED TYPE: Primary | ICD-10-CM

## 2020-07-17 DIAGNOSIS — R53.83 FATIGUE, UNSPECIFIED TYPE: ICD-10-CM

## 2020-07-17 DIAGNOSIS — D50.0 CHRONIC BLOOD LOSS ANEMIA: Primary | ICD-10-CM

## 2020-07-17 LAB
ALBUMIN SERPL-MCNC: 3.2 G/DL (ref 3.4–5)
ALBUMIN/GLOB SERPL: 0.7 {RATIO} (ref 0.8–1.7)
ALP SERPL-CCNC: 45 U/L (ref 45–117)
ALT SERPL-CCNC: 15 U/L (ref 13–56)
ANION GAP SERPL CALC-SCNC: 5 MMOL/L (ref 3–18)
AST SERPL-CCNC: 11 U/L (ref 10–38)
BASOPHILS # BLD: 0 K/UL (ref 0–0.1)
BASOPHILS NFR BLD: 0 % (ref 0–2)
BILIRUB SERPL-MCNC: 0.2 MG/DL (ref 0.2–1)
BUN SERPL-MCNC: 7 MG/DL (ref 7–18)
BUN/CREAT SERPL: 10 (ref 12–20)
CALCIUM SERPL-MCNC: 8.2 MG/DL (ref 8.5–10.1)
CHLORIDE SERPL-SCNC: 107 MMOL/L (ref 100–111)
CO2 SERPL-SCNC: 26 MMOL/L (ref 21–32)
CREAT SERPL-MCNC: 0.68 MG/DL (ref 0.6–1.3)
DIFFERENTIAL METHOD BLD: ABNORMAL
EOSINOPHIL # BLD: 0.1 K/UL (ref 0–0.4)
EOSINOPHIL NFR BLD: 1 % (ref 0–5)
ERYTHROCYTE [DISTWIDTH] IN BLOOD BY AUTOMATED COUNT: 22.5 % (ref 11.6–14.5)
GLOBULIN SER CALC-MCNC: 4.4 G/DL (ref 2–4)
GLUCOSE SERPL-MCNC: 93 MG/DL (ref 74–99)
HCG SERPL QL: NEGATIVE
HCT VFR BLD AUTO: 18.3 % (ref 35–45)
HGB BLD-MCNC: 5 G/DL (ref 12–16)
LYMPHOCYTES # BLD: 2.3 K/UL (ref 0.9–3.6)
LYMPHOCYTES NFR BLD: 31 % (ref 21–52)
MCH RBC QN AUTO: 15.7 PG (ref 24–34)
MCHC RBC AUTO-ENTMCNC: 27.3 G/DL (ref 31–37)
MCV RBC AUTO: 57.5 FL (ref 74–97)
MONOCYTES # BLD: 0.7 K/UL (ref 0.05–1.2)
MONOCYTES NFR BLD: 9 % (ref 3–10)
NEUTS SEG # BLD: 4.4 K/UL (ref 1.8–8)
NEUTS SEG NFR BLD: 59 % (ref 40–73)
PLATELET # BLD AUTO: 392 K/UL (ref 135–420)
POTASSIUM SERPL-SCNC: 3.6 MMOL/L (ref 3.5–5.5)
PROT SERPL-MCNC: 7.6 G/DL (ref 6.4–8.2)
RBC # BLD AUTO: 3.18 M/UL (ref 4.2–5.3)
SODIUM SERPL-SCNC: 138 MMOL/L (ref 136–145)
WBC # BLD AUTO: 7.4 K/UL (ref 4.6–13.2)

## 2020-07-17 PROCEDURE — 86900 BLOOD TYPING SEROLOGIC ABO: CPT

## 2020-07-17 PROCEDURE — 80053 COMPREHEN METABOLIC PANEL: CPT

## 2020-07-17 PROCEDURE — 85025 COMPLETE CBC W/AUTO DIFF WBC: CPT

## 2020-07-17 PROCEDURE — 99285 EMERGENCY DEPT VISIT HI MDM: CPT

## 2020-07-17 PROCEDURE — 84703 CHORIONIC GONADOTROPIN ASSAY: CPT

## 2020-07-17 PROCEDURE — 74011250636 HC RX REV CODE- 250/636: Performed by: NURSE PRACTITIONER

## 2020-07-17 PROCEDURE — 86923 COMPATIBILITY TEST ELECTRIC: CPT

## 2020-07-17 PROCEDURE — 36430 TRANSFUSION BLD/BLD COMPNT: CPT

## 2020-07-17 PROCEDURE — P9016 RBC LEUKOCYTES REDUCED: HCPCS

## 2020-07-17 PROCEDURE — 74011250637 HC RX REV CODE- 250/637: Performed by: EMERGENCY MEDICINE

## 2020-07-17 RX ORDER — ACETAMINOPHEN 325 MG/1
650 TABLET ORAL ONCE
Status: COMPLETED | OUTPATIENT
Start: 2020-07-17 | End: 2020-07-17

## 2020-07-17 RX ORDER — SODIUM CHLORIDE 9 MG/ML
250 INJECTION, SOLUTION INTRAVENOUS AS NEEDED
Status: DISCONTINUED | OUTPATIENT
Start: 2020-07-17 | End: 2020-07-18 | Stop reason: HOSPADM

## 2020-07-17 RX ADMIN — ACETAMINOPHEN 650 MG: 325 TABLET, FILM COATED ORAL at 20:52

## 2020-07-17 RX ADMIN — SODIUM CHLORIDE 1000 ML: 900 INJECTION, SOLUTION INTRAVENOUS at 18:15

## 2020-07-17 NOTE — Clinical Note
Charles Truong was seen and treated in our emergency department on 7/17/2020. She may return to work on .

## 2020-07-17 NOTE — PROGRESS NOTES
I called [facility] ED  ( [phone]) and spoke with \"Eusebia\" to inform Dr. Gilles Tony,  that patient will be in at 5:30pm today for infusion, per Dr. Marck Mcallister. Patient aware.

## 2020-07-17 NOTE — PROGRESS NOTES
25 minutes spent with patient of which greater than 50 percnt spent in cuonseling with patient and sister in regards to critical hgb result of 5.4 in light of patient being symptomatic with ice craving, fatigue, weakness, and also appears symptomatic - appears to have SOB when she is taking. Discussed risk benefit ratio of bloood transfusion when clinically necessary and explained that in her situation, the benefits a blood transfusion would have on her in terms of her overall health and ability to perform ADLs far outweighs the benefits. Pt. Agreed to come to ER after work at 17:30 today to have blood transfusion and then the plan is for regular iron infusions starting hopefully next week. While she gets her iron infusions we will plan to have another appointment to discuss how to treat the underlying problem of bleeding. Pt. Expressed understanding, HEr sister expressed understanding. They both agreed with plan.  Dr. Sebastian Frye in ER notified of patient and her need for blood transfusion/

## 2020-07-17 NOTE — LETTER
[facility] EMERGENCY DEPT 
[address] 
[address] [facility] [address] [phone] Work/School Note Date: 7/17/2020 To Whom It May concern: Nina Griffith was seen and treated today in the emergency room by the following provider(s): 
Attending Provider: Leoncio Santos MD 
Nurse Practitioner: Jayne Chen NP. Nina Griffith may return to work on 07/21/2020. Sincerely, Molly Yoo NP

## 2020-07-17 NOTE — LETTER
NOTIFICATION RETURN TO WORK / SCHOOL 
 
7/18/2020 12:12 AM 
 
Ms. Benigno Hubbard [address] [facility] [phone] [id number] To Whom It May Concern: Benigno Hubbard is currently under the care of [facility] EMERGENCY DEPT. She will return to work/school on: 7/21/20 Benigno Hubbard may return to work/school with the following restrictions: No restrictions. If there are questions or concerns please have the patient contact our office.  
 
 
 
Sincerely, 
 
 
Enrique Erickson, DO

## 2020-07-17 NOTE — ED TRIAGE NOTES
Since June gerardo been having heavy menses. Found to be lethargic and fatigued. Had blood work done 7/15.  Hg 5.6. here for blood transfusion

## 2020-07-18 LAB
ABO + RH BLD: NORMAL
BLD PROD TYP BPU: NORMAL
BLOOD GROUP ANTIBODIES SERPL: NORMAL
BPU ID: NORMAL
CROSSMATCH RESULT,%XM: NORMAL
SPECIMEN EXP DATE BLD: NORMAL
STATUS OF UNIT,%ST: NORMAL
UNIT DIVISION, %UDIV: 0

## 2020-07-18 NOTE — ED PROVIDER NOTES
EMERGENCY DEPARTMENT HISTORY AND PHYSICAL EXAM    9:50 PM      Date: 2020  Patient Name: Gila Yousif    History of Presenting Illness     Chief Complaint   Patient presents with    Abnormal Lab Results         History Provided By: Patient    Additional History (Context): Gila Yousif is a 39 y.o. female with hx of anemia and uterine fibroids who presents for blood transfusion after her GYN called due to low hemoglobin of 5.4, 2 days ago. Patient reports she has been working but has been feeling tired. Patient reports the bleeding has slowed down since she started taking the medication prescribed by her OB/GYN Tranexamic. Patient reports the bleeding has been due to her fibroids and has gotten ultrasound. Patient has follow-up with her OB/GYN this month. Patient reports she only changed 3 pads in total today. Patient denies any abdominal pain, chills, nausea, vomiting, diarrhea, blood in her stool. Patient denies chest pain or shortness of breath. Pt denies dizziness, headaches, numbness. PCP: Darrel Campbell MD    Current Facility-Administered Medications   Medication Dose Route Frequency Provider Last Rate Last Dose    0.9% sodium chloride infusion 250 mL  250 mL IntraVENous PRN Tho Plan, NP         Current Outpatient Medications   Medication Sig Dispense Refill    tranexamic acid (LYSTEDA) 650 mg tab tablet Take 2 Tabs by mouth three (3) times daily. 30 Tab 0    norgestimate-ethinyl estradioL (ORTHO-CYCLEN, SPRINTEC) 0.25-35 mg-mcg tab Take 1 Tab by mouth daily.  3 Package 4       Past History     Past Medical History:  Past Medical History:   Diagnosis Date    Anemia NEC as a child    iron deficiency     Breast lump 2017    Left Breast Lump    Fibroid        Past Surgical History:  Past Surgical History:   Procedure Laterality Date    ABDOMEN SURGERY PROC UNLISTED  ? or     ob/gyn    HX  SECTION         Family History:  Family History   Problem Relation Age of Onset    Hypertension Mother     No Known Problems Father        Social History:  Social History     Tobacco Use    Smoking status: Former Smoker     Packs/day: 0.09     Types: Cigarettes, Cigars     Last attempt to quit: 2007     Years since quittin.0    Smokeless tobacco: Never Used    Tobacco comment: patient smokes maybe one cigarette a month. She smokes when stressed out. Substance Use Topics    Alcohol use: Yes     Alcohol/week: 0.8 standard drinks     Types: 1 Standard drinks or equivalent per week     Comment: socially (sometimes)    Drug use: No       Allergies:  No Known Allergies      Review of Systems       Review of Systems   Constitutional: Positive for fatigue. Negative for chills and fever. Respiratory: Negative for shortness of breath. Cardiovascular: Negative for chest pain. Gastrointestinal: Negative for abdominal pain, nausea and vomiting. Genitourinary: Positive for vaginal bleeding. Skin: Negative for rash. Neurological: Negative for weakness. All other systems reviewed and are negative. Physical Exam     Visit Vitals  /61   Pulse 91   Temp 99 °F (37.2 °C)   Resp 26   Ht 5' 3\" (1.6 m)   Wt 67.1 kg (148 lb)   LMP 2020 (Approximate)   SpO2 100%   BMI 26.22 kg/m²         Physical Exam  Vitals signs reviewed. Exam conducted with a chaperone present Peggy Search). Constitutional:       General: She is not in acute distress. Appearance: Normal appearance. She is well-developed. She is not ill-appearing or toxic-appearing. Comments: Patient in no acute distress. HENT:      Head: Normocephalic and atraumatic. Eyes:      Conjunctiva/sclera: Conjunctivae normal.      Pupils: Pupils are equal, round, and reactive to light. Neck:      Musculoskeletal: Normal range of motion. Trachea: Trachea normal.   Cardiovascular:      Rate and Rhythm: Normal rate and regular rhythm.    Pulmonary:      Effort: Pulmonary effort is normal.      Breath sounds: Normal breath sounds. Abdominal:      General: Bowel sounds are normal. There is no distension or abdominal bruit. Palpations: Abdomen is soft. Abdomen is not rigid. There is no shifting dullness, fluid wave, mass or pulsatile mass. Tenderness: There is no abdominal tenderness. There is no guarding. Negative signs include Berman's sign and McBurney's sign. Comments: No abdominal tenderness on exam.   Genitourinary:     Vagina: Bleeding present. Cervix: No cervical motion tenderness, friability, erythema or cervical bleeding. Uterus: Normal.       Adnexa: Right adnexa normal and left adnexa normal.      Comments: No active bleeding from cervix. Old brown blood on vaginal canal.   Skin:     General: Skin is warm. Capillary Refill: Capillary refill takes less than 2 seconds. Coloration: Skin is not cyanotic. Neurological:      General: No focal deficit present. Mental Status: She is alert and oriented to person, place, and time. Diagnostic Study Results     Labs -  Recent Results (from the past 12 hour(s))   CBC WITH AUTOMATED DIFF    Collection Time: 07/17/20  6:10 PM   Result Value Ref Range    WBC 7.4 4.6 - 13.2 K/uL    RBC 3.18 (L) 4.20 - 5.30 M/uL    HGB 5.0 (LL) 12.0 - 16.0 g/dL    HCT 18.3 (L) 35.0 - 45.0 %    MCV 57.5 (L) 74.0 - 97.0 FL    MCH 15.7 (L) 24.0 - 34.0 PG    MCHC 27.3 (L) 31.0 - 37.0 g/dL    RDW 22.5 (H) 11.6 - 14.5 %    PLATELET 105 326 - 273 K/uL    NEUTROPHILS 59 40 - 73 %    LYMPHOCYTES 31 21 - 52 %    MONOCYTES 9 3 - 10 %    EOSINOPHILS 1 0 - 5 %    BASOPHILS 0 0 - 2 %    ABS. NEUTROPHILS 4.4 1.8 - 8.0 K/UL    ABS. LYMPHOCYTES 2.3 0.9 - 3.6 K/UL    ABS. MONOCYTES 0.7 0.05 - 1.2 K/UL    ABS. EOSINOPHILS 0.1 0.0 - 0.4 K/UL    ABS.  BASOPHILS 0.0 0.0 - 0.1 K/UL    DF AUTOMATED     METABOLIC PANEL, COMPREHENSIVE    Collection Time: 07/17/20  6:10 PM   Result Value Ref Range    Sodium 138 136 - 145 mmol/L    Potassium 3.6 3.5 - 5.5 mmol/L    Chloride 107 100 - 111 mmol/L    CO2 26 21 - 32 mmol/L    Anion gap 5 3.0 - 18 mmol/L    Glucose 93 74 - 99 mg/dL    BUN 7 7.0 - 18 MG/DL    Creatinine 0.68 0.6 - 1.3 MG/DL    BUN/Creatinine ratio 10 (L) 12 - 20      GFR est AA >60 >60 ml/min/1.73m2    GFR est non-AA >60 >60 ml/min/1.73m2    Calcium 8.2 (L) 8.5 - 10.1 MG/DL    Bilirubin, total 0.2 0.2 - 1.0 MG/DL    ALT (SGPT) 15 13 - 56 U/L    AST (SGOT) 11 10 - 38 U/L    Alk. phosphatase 45 45 - 117 U/L    Protein, total 7.6 6.4 - 8.2 g/dL    Albumin 3.2 (L) 3.4 - 5.0 g/dL    Globulin 4.4 (H) 2.0 - 4.0 g/dL    A-G Ratio 0.7 (L) 0.8 - 1.7     HCG QL SERUM    Collection Time: 07/17/20  6:10 PM   Result Value Ref Range    HCG, Ql. Negative NEG     TYPE + CROSSMATCH    Collection Time: 07/17/20  6:10 PM   Result Value Ref Range    Crossmatch Expiration 07/20/2020     ABO/Rh(D) A POSITIVE     Antibody screen NEG     Unit number [id number]     Blood component type RC LR     Unit division 00     Status of unit ISSUED     Crossmatch result Compatible        Radiologic Studies -   No orders to display         Medical Decision Making   I am the first provider for this patient. I reviewed the vital signs, available nursing notes, past medical history, past surgical history, family history and social history. Vital Signs-Reviewed the patient's vital signs. Records Reviewed: Nursing Notes and Old Medical Records (Time of Review: 9:50 PM)    ED Course: Progress Notes, Reevaluation, and Consults:       Provider Notes (Medical Decision Making):   39 y.o. female with hx of anemia and uterine fibroids who presents for blood transfusion after her GYN called due to low hemoglobin of 5.4, 2 days ago. Patient reports she has been working but has been feeling tired. Patient reports the bleeding has slowed down since she started taking the medication prescribed by her OB/GYN Tranexamic.   Patient reports the bleeding has been due to her fibroids and has gotten ultrasound. Patient has follow-up with her OB/GYN this month. Patient reports she only changed 3 pads in total today. Patient denies any abdominal pain, chills, nausea, vomiting, diarrhea, blood in her stool. Patient denies chest pain or shortness of breath. Pt denies dizziness, headaches, numbness. Pt in no acute distress. Ambulatory. Pt hemodynamically stable at this time. On pelvic exam there was no active bleeding from OS , old blood noticed on vaginal canal. No CMT tenderness or adnexal tenderness. Patient hemoglobin during visit was a 5. Unit of blood ordered to be transfused. Pt had no abdominal tenderness. I do not suspect GI bleed at this time. Otherwise labs are unremarkable. Plan is for patient to be discharge home after blood transfusion. PROGRESS NOTE:  10:03 PM   Patient care will be transferred to Dr. Gómez Collins. Discussed available diagnostic results and care plan at length. Diagnosis     Clinical Impression:   1. Anemia, unspecified type    2. Fatigue, unspecified type        Disposition: TBD     Follow-up Information     Follow up With Specialties Details Why [facility] EMERGENCY DEPT Emergency Medicine  If symptoms worsen [address] [place] [address] 51    Hany Álvarez MD Obstetrics & Gynecology, Gynecology, Obstetrics Schedule an appointment as soon as possible for a visit in 3 days  [place] [address] Nancy Benjamin [address] [facility] Joaquin Betancourt MD Internal Medicine Schedule an appointment as soon as possible for a visit As needed [address] [place] [id number]  [phone]             Patient's Medications   Start Taking    No medications on file   Continue Taking    NORGESTIMATE-ETHINYL ESTRADIOL (ORTHO-CYCLEN, [facility]) 0.25-35 MG-MCG TAB    Take 1 Tab by mouth daily. TRANEXAMIC ACID (LYSTEDA) 650 MG TAB TABLET    Take 2 Tabs by mouth three (3) times daily.    These Medications have changed    No medications on file   Stop Taking    No medications on file       Dictation disclaimer:  Please note that this dictation was completed with [employer], the computer voice recognition software. Quite often unanticipated grammatical, syntax, homophones, and other interpretive errors are inadvertently transcribed by the computer software. Please disregard these errors. Please excuse any errors that have escaped final proofreading.

## 2020-07-18 NOTE — ED NOTES
Vitals:  Patient Vitals for the past 12 hrs:   Temp Pulse Resp BP SpO2   07/17/20 2330 98.7 °F (37.1 °C) 84 29 115/71 100 %   07/17/20 2300 98.8 °F (37.1 °C) 84 30 109/69 100 %   07/17/20 2245  97 28 109/64 99 %   07/17/20 2200  92 26 107/68 100 %   07/17/20 2145  92 29 109/66 100 %   07/17/20 2130 99 °F (37.2 °C) 98 26 110/56 100 %   07/17/20 2115 99 °F (37.2 °C) 91 26 118/61 100 %   07/17/20 2100  87 17 111/64 100 %   07/17/20 2045 98.9 °F (37.2 °C) (!) 101 22 112/46 100 %   07/17/20 2030 99.4 °F (37.4 °C) 91 25 110/57 100 %   07/17/20 1830    105/66 100 %   07/17/20 1730 98 °F (36.7 °C) (!) 106 15 120/78 100 %       Medications ordered:   Medications   0.9% sodium chloride infusion 250 mL (has no administration in time range)   sodium chloride 0.9 % bolus infusion 1,000 mL (1,000 mL IntraVENous New Bag 7/17/20 1815)   acetaminophen (TYLENOL) tablet 650 mg (650 mg Oral Given 7/17/20 2052)         Lab findings:  Recent Results (from the past 12 hour(s))   CBC WITH AUTOMATED DIFF    Collection Time: 07/17/20  6:10 PM   Result Value Ref Range    WBC 7.4 4.6 - 13.2 K/uL    RBC 3.18 (L) 4.20 - 5.30 M/uL    HGB 5.0 (LL) 12.0 - 16.0 g/dL    HCT 18.3 (L) 35.0 - 45.0 %    MCV 57.5 (L) 74.0 - 97.0 FL    MCH 15.7 (L) 24.0 - 34.0 PG    MCHC 27.3 (L) 31.0 - 37.0 g/dL    RDW 22.5 (H) 11.6 - 14.5 %    PLATELET 142 884 - 201 K/uL    NEUTROPHILS 59 40 - 73 %    LYMPHOCYTES 31 21 - 52 %    MONOCYTES 9 3 - 10 %    EOSINOPHILS 1 0 - 5 %    BASOPHILS 0 0 - 2 %    ABS. NEUTROPHILS 4.4 1.8 - 8.0 K/UL    ABS. LYMPHOCYTES 2.3 0.9 - 3.6 K/UL    ABS. MONOCYTES 0.7 0.05 - 1.2 K/UL    ABS. EOSINOPHILS 0.1 0.0 - 0.4 K/UL    ABS.  BASOPHILS 0.0 0.0 - 0.1 K/UL    DF AUTOMATED     METABOLIC PANEL, COMPREHENSIVE    Collection Time: 07/17/20  6:10 PM   Result Value Ref Range    Sodium 138 136 - 145 mmol/L    Potassium 3.6 3.5 - 5.5 mmol/L    Chloride 107 100 - 111 mmol/L    CO2 26 21 - 32 mmol/L    Anion gap 5 3.0 - 18 mmol/L    Glucose 93 74 - 99 mg/dL    BUN 7 7.0 - 18 MG/DL    Creatinine 0.68 0.6 - 1.3 MG/DL    BUN/Creatinine ratio 10 (L) 12 - 20      GFR est AA >60 >60 ml/min/1.73m2    GFR est non-AA >60 >60 ml/min/1.73m2    Calcium 8.2 (L) 8.5 - 10.1 MG/DL    Bilirubin, total 0.2 0.2 - 1.0 MG/DL    ALT (SGPT) 15 13 - 56 U/L    AST (SGOT) 11 10 - 38 U/L    Alk. phosphatase 45 45 - 117 U/L    Protein, total 7.6 6.4 - 8.2 g/dL    Albumin 3.2 (L) 3.4 - 5.0 g/dL    Globulin 4.4 (H) 2.0 - 4.0 g/dL    A-G Ratio 0.7 (L) 0.8 - 1.7     HCG QL SERUM    Collection Time: 07/17/20  6:10 PM   Result Value Ref Range    HCG, Ql. Negative NEG     TYPE + CROSSMATCH    Collection Time: 07/17/20  6:10 PM   Result Value Ref Range    Crossmatch Expiration 07/20/2020     ABO/Rh(D) A POSITIVE     Antibody screen NEG     Unit number [id number]     Blood component type RC LR     Unit division 00     Status of unit ISSUED     Crossmatch result Compatible        X-Ray, CT or other radiology findings or impressions:  No orders to display       Progress notes, Consult notes or additional Procedure notes:   Time: 2200. I received this patient as a signout from Dr. Leticia Freitas and [place], [place]. Patient was sent in for blood transfusion as she does have a history of uterine fibroids and does have heavy menstrual cycles. Her hemoglobin is found to be 5.0 in the emergency department. She was sent over by her gynecologist for blood transfusion. Patient is currently receiving her blood. As long she is asymptomatic that she can be discharged home after 1 unit. Reevaluation of patient:   I reevaluate the patient after her blood transfusion. Patient states that she is hungry and would like to go home at this time. Patient does not want to get a repeat hemoglobin drawn at this time. She states that she has no symptoms, she was ambulatory after the unit of blood and states that she feels much better.   Patient denies any lightheadedness, dizziness, chest pain, shortness of breath, Prasanna pain, nausea, vomiting. Patient states that her vaginal bleeding has stopped. She is on tranexamic acid at this time that was given by her OB/GYN. Her vitals are stable at this time. I do not feel the need to give her an additional unit at this time since she states that she is asymptomatic  And is feeling much better. The patient will be given time off from work so she can rest.  Patient will be discharged at this time. No further questions. Disposition:  Diagnosis:   1. Anemia, unspecified type    2. Fatigue, unspecified type        Disposition: Discharge    Follow-up Information     Follow up With Specialties Details Why 500 [facility] EMERGENCY DEPT Emergency Medicine  If symptoms worsen [address] [facility] 51    Patt Arroyo MD Obstetrics & Gynecology, Gynecology, Obstetrics Schedule an appointment as soon as possible for a visit in 3 days  [place] [phone] Leobardo Gaby  [facility] 83 [facility] Joaquin Garza MD Internal Medicine Schedule an appointment as soon as possible for a visit As needed [address] [place] [address]  [phone]              Patient's Medications   Start Taking    No medications on file   Continue Taking    NORGESTIMATE-ETHINYL ESTRADIOL (ORTHO-CYCLEN, [facility]) 0.25-35 MG-MCG TAB    Take 1 Tab by mouth daily. TRANEXAMIC ACID (LYSTEDA) 650 MG TAB TABLET    Take 2 Tabs by mouth three (3) times daily.    These Medications have changed    No medications on file   Stop Taking    No medications on file     Kylie Shane DO

## 2020-07-18 NOTE — DISCHARGE INSTRUCTIONS
Patient Education        Anemia: Care Instructions  Your Care Instructions     Anemia is a low level of red blood cells, which carry oxygen throughout your body. Many things can cause anemia. Lack of iron is one of the most common causes. Your body needs iron to make hemoglobin, a substance in red blood cells that carries oxygen from the lungs to your body's cells. Without enough iron, the body produces fewer and smaller red blood cells. As a result, your body's cells do not get enough oxygen, and you feel tired and weak. And you may have trouble concentrating. Bleeding is the most common cause of a lack of iron. You may have heavy menstrual bleeding or bleeding caused by conditions such as ulcers, hemorrhoids, or cancer. Regular use of aspirin or other anti-inflammatory medicines (such as ibuprofen) also can cause bleeding in some people. A lack of iron in your diet also can cause anemia, especially at times when the body needs more iron, such as during pregnancy, infancy, and the teen years. Your doctor may have prescribed iron pills. It may take several months of treatment for your iron levels to return to normal. Your doctor also may suggest that you eat foods that are rich in iron, such as meat and beans. There are many other causes of anemia. It is not always due to a lack of iron. Finding the specific cause of your anemia will help your doctor find the right treatment for you. Follow-up care is a key part of your treatment and safety. Be sure to make and go to all appointments, and call your doctor if you are having problems. It's also a good idea to know your test results and keep a list of the medicines you take. How can you care for yourself at home? · Take your medicines exactly as prescribed. Call your doctor if you think you are having a problem with your medicine.   · If your doctor recommends iron pills, take them as directed:  ? Try to take the pills on an empty stomach about 1 hour before or 2 hours after meals. But you may need to take iron with food to avoid an upset stomach. ? Do not take antacids or drink milk or caffeine drinks (such as coffee, tea, or cola) at the same time or within 2 hours of the time that you take your iron. They can make it hard for your body to absorb the iron. ? Vitamin C (from food or supplements) helps your body absorb iron. Try taking iron pills with a glass of orange juice or some other food that is high in vitamin C, such as citrus fruits. ? Iron pills may cause stomach problems, such as heartburn, nausea, diarrhea, constipation, and cramps. Be sure to drink plenty of fluids, and include fruits, vegetables, and fiber in your diet each day. Iron pills often make your bowel movements dark or green. ? If you forget to take an iron pill, do not take a double dose of iron the next time you take a pill. ? Keep iron pills out of the reach of small children. An overdose of iron can be very dangerous. · Follow your doctor's advice about eating iron-rich foods. These include red meat, shellfish, poultry, eggs, beans, raisins, whole-grain bread, and leafy green vegetables. · Steam vegetables to help them keep their iron content. When should you call for help? [id number] anytime you think you may need emergency care. For example, call if:  · You have symptoms of a heart attack. These may include:  ? Chest pain or pressure, or a strange feeling in the chest.  ? Sweating. ? Shortness of breath. ? Nausea or vomiting. ? Pain, pressure, or a strange feeling in the back, neck, jaw, or upper belly or in one or both shoulders or arms. ? Lightheadedness or sudden weakness. ? A fast or irregular heartbeat. After you call 911, the  may tell you to chew 1 adult-strength or 2 to 4 low-dose aspirin. Wait for an ambulance. Do not try to drive yourself. · You passed out (lost consciousness).   Call your doctor now or seek immediate medical care if:  · You have new or increased shortness of breath. · You are dizzy or lightheaded, or you feel like you may faint. · Your fatigue and weakness continue or get worse. · You have any abnormal bleeding, such as:  ? Nosebleeds. ? Vaginal bleeding that is different (heavier, more frequent, at a different time of the month) than what you are used to.  ? Bloody or black stools, or rectal bleeding. ? Bloody or pink urine. Watch closely for changes in your health, and be sure to contact your doctor if:  · You do not get better as expected. Where can you learn more? Go to http://luana-catalina.info/  Enter R301 in the search box to learn more about \"Anemia: Care Instructions. \"  Current as of: November 8, 2019               Content Version: 12.5  © 7699-1391 [employer]. Care instructions adapted under license by [employer] (which disclaims liability or warranty for this information). If you have questions about a medical condition or this instruction, always ask your healthcare professional. [place] [address] any warranty or liability for your use of this information. Patient Education        Learning About Blood Transfusions  What is a blood transfusion? Blood transfusion is a medical treatment to replace the blood or parts of blood that your body has lost. The blood goes through a tube from a bag to an intravenous (IV) catheter and into your vein. You may need a blood transfusion after losing blood from an injury, a major surgery, an illness that causes bleeding, or an illness that destroys blood cells. Transfusions are also used to give you the parts of blood--such as platelets, plasma, or substances that cause clotting--that your body needs to fight an illness or stop bleeding. How is a blood transfusion done? Before you receive a blood transfusion, your blood is tested to find out what your blood type is.  Blood or blood parts that are a match with your blood type are ordered by your doctor. Blood is typed as A, B, AB, or O. It is also typed as Rh-positive or Rh-negative. Your blood is also screened to look for antibodies that might react with the blood that is given to you. The blood you are getting is checked and rechecked to make sure that it's the right type for you. A sample of your blood is mixed with a sample of the blood you will receive to check for problems. Before actually giving you the transfusion, a doctor and nurses will look at the label on the package of blood and compare it to your hospital ID bracelet and medical records. The transfusion begins only when all agree that this is the correct blood and that you are the correct person to receive it. To receive the transfusion, you will have an intravenous (IV) catheter inserted into a vein. A tube connects the catheter to the bag containing the blood, which is placed higher than your body. The blood then flows slowly into your vein. A doctor or nurse will check you several times during the transfusion to watch for a reaction or other problems. What are the possible risks? Blood transfusions have many benefits and are often life-saving. But they also have a few risks. Possible risks include:  · Your body's reaction to receiving new blood. This may include:  ? Fever. ? Allergic reactions. ? Breathing problems. · An infection from the blood. This risk is small because of the strict rules placed on handling and storing blood. Getting a viral infection, such as HIV or hepatitis B or C, through blood transfusions has become very rare. The U.S. Food and Drug Administration (FDA) enforces strict guidelines on the collection, testing, storage, and use of blood. · Getting the wrong blood type by accident. Severe reactions, which can be life-threatening, are very rare. What can you expect after a blood transfusion?   Here are some things you can do at home to help prevent infection at the transfusion site:  · Wash the area daily with warm, soapy water, and pat it dry. Don't use hydrogen peroxide or alcohol, which can slow healing. You may cover the area with a gauze bandage if it weeps or rubs against clothing. Change the bandage every day. · Keep the area clean and dry. When should you call for help? [id number] anytime you think you may need emergency care. For example, call if:  · You have severe trouble breathing. Call your doctor now or seek immediate medical care if:  · You have a fever. · You feel weaker or more tired than usual.  · You have a yellow tint to your skin or the whites of your eyes. Watch closely for changes in your health, and be sure to contact your doctor if you have any problems. Follow-up care is a key part of your treatment and safety. Be sure to make and go to all appointments, and call your doctor if you are having problems. It's also a good idea to know your test results and keep a list of the medicines you take. Where can you learn more? Go to http://luana-catalina.info/  Enter V588 in the search box to learn more about \"Learning About Blood Transfusions. \"  Current as of: November 8, 2019               Content Version: 12.5  © 6088-2000 [employer]. Care instructions adapted under license by [employer] (which disclaims liability or warranty for this information). If you have questions about a medical condition or this instruction, always ask your healthcare professional. [facility] [address] any warranty or liability for your use of this information. Return to ED if symptoms worsen. Follow up with OBGYN doctor as scheduled.

## 2020-07-23 ENCOUNTER — HOSPITAL ENCOUNTER (OUTPATIENT)
Dept: LAB | Age: 45
Discharge: HOME | End: 2020-07-23
Payer: COMMERCIAL

## 2020-07-23 ENCOUNTER — CLINICAL SUPPORT (OUTPATIENT)
Dept: OBGYN CLINIC | Age: 45
End: 2020-07-23

## 2020-07-23 ENCOUNTER — OFFICE VISIT (OUTPATIENT)
Dept: OBGYN CLINIC | Age: 45
End: 2020-07-23

## 2020-07-23 VITALS
BODY MASS INDEX: 26.93 KG/M2 | HEART RATE: 84 BPM | DIASTOLIC BLOOD PRESSURE: 79 MMHG | HEIGHT: 63 IN | WEIGHT: 152 LBS | RESPIRATION RATE: 18 BRPM | SYSTOLIC BLOOD PRESSURE: 119 MMHG

## 2020-07-23 DIAGNOSIS — N93.9 ABNORMAL UTERINE BLEEDING: Primary | ICD-10-CM

## 2020-07-23 DIAGNOSIS — N93.9 ABNORMAL UTERINE BLEEDING: ICD-10-CM

## 2020-07-23 PROBLEM — D50.0 ANEMIA DUE TO BLOOD LOSS, CHRONIC: Status: ACTIVE | Noted: 2020-07-23

## 2020-07-23 LAB
HCT VFR BLD AUTO: 24.5 % (ref 35–45)
HGB BLD-MCNC: 6.8 G/DL (ref 12–16)

## 2020-07-23 PROCEDURE — 36415 COLL VENOUS BLD VENIPUNCTURE: CPT

## 2020-07-23 PROCEDURE — 85018 HEMOGLOBIN: CPT

## 2020-07-23 NOTE — PROGRESS NOTES
20 minutes spent with patient of which greater than 50 percent spent in counseling in regards to treating the underlying cause which is her enlarged fibroid uterus which based on today's ultrasound is difficult to appropriately assess due to abundance of fibroids. . Informed her that the best next step is to see Dr. Jyoti [place] for consultation for a Robotic hysterectomy - will get hemoglobin/hematocrit checked today since she is status post blood transfusion from [id number] Friday due to hemoglobin of 5.4 and patient with symptoms- to include ice cravings, fatigue, dizziness, lethargic.  Her appointment is set for Tuesday July 28th 10:30 am arrival.

## 2020-07-24 DIAGNOSIS — D50.0 ANEMIA DUE TO BLOOD LOSS, CHRONIC: ICD-10-CM

## 2020-07-24 NOTE — PROGRESS NOTES
[facility]  [address] [place] [address], [address], [address]  [address] [facility] ([place], [address] [place] [address]  [place], [address]   [phone]  Home Pace DO      Patient ID:  Name:  Jensen Franklin  MRN:  [id number]  :  1975/45 y.o. Date:  2020      HISTORY OF PRESENT ILLNESS:  Jensen Franklin is a 39 y. o.  premenopausal female referred by Dr. Vasyl Dao for uterine fibroids. She has history of uterine fibroids and abnormal uterine bleeding. Recently reported 07/15/2020 to Dr. Vasyl Dao with complaints of heavy uterine bleeding. Prescribed Lysteda. H/H 5.4 07/15/2020 s/p 1 unit PRBC in ED 2020. Follow up US completed by Dr. Vasyl Dao 2020 showed 4 uterine fibroids ranging from 6 cm -10 cm in size. Given findings the patient was counseling on treating the underlying cause which is her enlarged fibroid uterus given symptoms and need for blood transfusion. She is here today for further evaluation. no bleeding today. Labs:  none      Imaging  2020 TVUS  Findings   Uterus   Endometrium: 1.1 cm   Size A/P 10.8 cm    Width: 11.7 cm   Length: 10.8 cm   Comments: Fibroids seen   Ant fib 6.2x6. 3x6.6 cm   Intramural Fib 8.3x6.9x7.2 cm   Intramural Fib 6.5x5.2x7.1 cm   Intramural Fib to the left 10.1x11.9 x8.5 cm       Right Ovary   Not seen due to fibroids   Left Ovary   Not seen due to fibroids          ROS:   As above      Patient Active Problem List    Diagnosis Date Noted    Anemia due to blood loss, chronic 2020    Low back pain 2012     Past Medical History:   Diagnosis Date    Anemia NEC as a child    iron deficiency     Breast lump 2017    Left Breast Lump    Fibroid       Past Surgical History:   Procedure Laterality Date    ABDOMEN SURGERY PROC UNLISTED  ?2005 or 2006    ob/gyn    HX  SECTION        OB History        1    Para   1    Term   1            AB Living   1       SAB        TAB        Ectopic        Molar        Multiple        Live Births   1              Social History     Tobacco Use    Smoking status: Former Smoker     Packs/day: 0.09     Types: Cigarettes, Cigars     Last attempt to quit: 2007     Years since quittin.0    Smokeless tobacco: Never Used    Tobacco comment: patient smokes maybe one cigarette a month. She smokes when stressed out. Substance Use Topics    Alcohol use: Yes     Alcohol/week: 0.8 standard drinks     Types: 1 Standard drinks or equivalent per week     Comment: socially (sometimes)      Family History   Problem Relation Age of Onset    Hypertension Mother     No Known Problems Father     Cancer Maternal Aunt         Breast      Current Outpatient Medications   Medication Sig    tranexamic acid (LYSTEDA) 650 mg tab tablet Take 2 Tabs by mouth three (3) times daily.  norgestimate-ethinyl estradioL (ORTHO-CYCLEN, SPRINTEC) 0.25-35 mg-mcg tab Take 1 Tab by mouth daily. No current facility-administered medications for this visit. No Known Allergies       OBJECTIVE:    Physical Exam  VITAL SIGNS: Visit Vitals  Ht 5' 3\" (1.6 m)   Wt 68.2 kg (150 lb 6.4 oz)   LMP 2020 (Approximate)   BMI 26.64 kg/m²      GENERAL PARISH: in no apparent distress and well developed and well nourished   MUSCULOSKEL: no joint tenderness, deformity or swelling   INTEGUMENT:  warm and dry, no rashes or lesions   ABDOMEN . soft, NT, ND, fibroid uterus palpable above umblicus   EXTREMITIES: extremities normal, atraumatic, no cyanosis or edema   PELVIC: External genitalia: normal general appearance  Vaginal: normal mucosa without prolapse or lesions  Cervix: normal appearance  Adnexa: non palpable  Uterus: 16-18 wk uterus with large fibroid filling pelvis   RECTAL: deferred   KIRBY SURVEY: Cervical, supraclavicular, axillary and inguinal nodes normal.   NEURO: Grossly normal         IMPRESSION/PLAN:  1.  Fibroid uterus, chronic blood loss anemia, menorrhagia   -reviewed her imaging   -given anemia and large fibroids recommend proceeding with hysterectomy, bilateral salpingectomies   -given size of uterus we discussed proceeding with laparotomy   -pt agree able with plan   -surgery to be scheduled on 8/10 at [facility]    The total time spent was 60 minutes regarding this patients diagnosis of fibroid uterus, menorrhagia, chronic blood loss anemia and >50% of this time was spent counseling and coordinating care    [facility]  7/28/202010:06 AM

## 2020-07-24 NOTE — H&P (VIEW-ONLY)
[address], [address] [facility], [address] [place] [address] 
[address] [place] [address], [address] [place], [address] 
 [phone]  Patient ID: 
Name:  Tootie Juan MRN:  [id number] :  1975/45 y.o. Date:  2020 HISTORY OF PRESENT ILLNESS: 
Tootie Juan is a 39 y. o.  premenopausal female referred by Dr. Manuela Davis for uterine fibroids. She has history of uterine fibroids and abnormal uterine bleeding. Recently reported 07/15/2020 to Dr. Manuela Davis with complaints of heavy uterine bleeding. Prescribed Lysteda. H/H 5.4 07/15/2020 s/p 1 unit PRBC in ED 2020. Follow up US completed by Dr. Manuela Davis 2020 showed 4 uterine fibroids ranging from 6 cm -10 cm in size. Given findings the patient was counseling on treating the underlying cause which is her enlarged fibroid uterus given symptoms and need for blood transfusion. She is here today for further evaluation. no bleeding today. Labs: 
none Imaging 
2020 TVUS Findings Uterus Endometrium: 1.1 cm Size A/P 10.8 cm    Width: 11.7 cm   Length: 10.8 cm Comments: Fibroids seen Ant fib 6.2x6. 3x6.6 cm Intramural Fib 8.3x6.9x7.2 cm Intramural Fib 6.5x5.2x7.1 cm Intramural Fib to the left 10.1x11.9 x8.5 cm Right Ovary Not seen due to fibroids Left Ovary Not seen due to fibroids ROS:  
As above Patient Active Problem List  
 Diagnosis Date Noted  Anemia due to blood loss, chronic 2020  Low back pain 2012 Past Medical History:  
Diagnosis Date  Anemia NEC as a child  
 iron deficiency  Breast lump 2017 Left Breast Lump  Fibroid Past Surgical History:  
Procedure Laterality Date  ABDOMEN SURGERY PROC UNLISTED  ? or   
 ob/gyn  HX  SECTION   OB History [place] [facility] 1 Para 1 Term 1  AB  
   
 Living  
1 SAB  
   
 TAB Ectopic Molar Multiple Live Births 1 Social History Tobacco Use  Smoking status: Former Smoker Packs/day: 0.09 Types: Cigarettes, Cigars Last attempt to quit: 2007 Years since quittin.0  Smokeless tobacco: Never Used  Tobacco comment: patient smokes maybe one cigarette a month. She smokes when stressed out. Substance Use Topics  Alcohol use: Yes Alcohol/week: 0.8 standard drinks Types: 1 Standard drinks or equivalent per week Comment: socially (sometimes) Family History Problem Relation Age of Onset  Hypertension Mother  No Known Problems Father  Cancer Maternal Aunt Breast  
  
Current Outpatient Medications Medication Sig  
 tranexamic acid (LYSTEDA) 650 mg tab tablet Take 2 Tabs by mouth three (3) times daily.  norgestimate-ethinyl estradioL (ORTHO-CYCLEN, SPRINTEC) 0.25-35 mg-mcg tab Take 1 Tab by mouth daily. No current facility-administered medications for this visit. No Known Allergies OBJECTIVE: 
 
Physical Exam 
VITAL SIGNS: Visit Vitals Ht 5' 3\" (1.6 m) Wt 68.2 kg (150 lb 6.4 oz) LMP 2020 (Approximate) BMI 26.64 kg/m² GENERAL PARISH: in no apparent distress and well developed and well nourished MUSCULOSKEL: no joint tenderness, deformity or swelling INTEGUMENT:  warm and dry, no rashes or lesions ABDOMEN . soft, NT, ND, fibroid uterus palpable above umblicus EXTREMITIES: extremities normal, atraumatic, no cyanosis or edema PELVIC: External genitalia: normal general appearance Vaginal: normal mucosa without prolapse or lesions Cervix: normal appearance Adnexa: non palpable Uterus: 16-18 wk uterus with large fibroid filling pelvis RECTAL: deferred KIRBY SURVEY: Cervical, supraclavicular, axillary and inguinal nodes normal.  
NEURO: Grossly normal  
 
 
 
IMPRESSION/PLAN: 
 1. Fibroid uterus, chronic blood loss anemia, menorrhagia 
 -reviewed her imaging 
 -given anemia and large fibroids recommend proceeding with hysterectomy, bilateral salpingectomies 
 -given size of uterus we discussed proceeding with laparotomy 
 -pt agree able with plan 
 -surgery to be scheduled on 8/10 at [facility] The total time spent was 60 minutes regarding this patients diagnosis of fibroid uterus, menorrhagia, chronic blood loss anemia and >50% of this time was spent counseling and coordinating care Yelena Smith DO Gynecologic Oncology 7/28/202010:06 AM

## 2020-07-28 ENCOUNTER — HOSPITAL ENCOUNTER (OUTPATIENT)
Dept: NON INVASIVE DIAGNOSTICS | Age: 45
Discharge: HOME | End: 2020-07-28
Payer: COMMERCIAL

## 2020-07-28 ENCOUNTER — OFFICE VISIT (OUTPATIENT)
Dept: ONCOLOGY | Age: 45
End: 2020-07-28

## 2020-07-28 ENCOUNTER — HOSPITAL ENCOUNTER (OUTPATIENT)
Dept: LAB | Age: 45
Discharge: HOME | End: 2020-07-28
Payer: COMMERCIAL

## 2020-07-28 VITALS
RESPIRATION RATE: 12 BRPM | WEIGHT: 150.4 LBS | HEART RATE: 95 BPM | SYSTOLIC BLOOD PRESSURE: 114 MMHG | TEMPERATURE: 98.3 F | OXYGEN SATURATION: 100 % | BODY MASS INDEX: 26.65 KG/M2 | HEIGHT: 63 IN | DIASTOLIC BLOOD PRESSURE: 77 MMHG

## 2020-07-28 DIAGNOSIS — Z01.818 PREOP TESTING: ICD-10-CM

## 2020-07-28 DIAGNOSIS — D25.1 INTRAMURAL LEIOMYOMA OF UTERUS: ICD-10-CM

## 2020-07-28 DIAGNOSIS — Z01.818 PREOP TESTING: Primary | ICD-10-CM

## 2020-07-28 DIAGNOSIS — D50.0 CHRONIC BLOOD LOSS ANEMIA: ICD-10-CM

## 2020-07-28 LAB
ABO + RH BLD: NORMAL
ANION GAP SERPL CALC-SCNC: 1 MMOL/L (ref 3–18)
ATRIAL RATE: 73 BPM
BASOPHILS # BLD: 0.1 K/UL (ref 0–0.1)
BASOPHILS NFR BLD: 1 % (ref 0–2)
BLOOD GROUP ANTIBODIES SERPL: NORMAL
BUN SERPL-MCNC: 8 MG/DL (ref 7–18)
BUN/CREAT SERPL: 14 (ref 12–20)
CALCIUM SERPL-MCNC: 8.7 MG/DL (ref 8.5–10.1)
CALCULATED P AXIS, ECG09: 55 DEGREES
CALCULATED R AXIS, ECG10: 14 DEGREES
CALCULATED T AXIS, ECG11: 31 DEGREES
CHLORIDE SERPL-SCNC: 108 MMOL/L (ref 100–111)
CO2 SERPL-SCNC: 30 MMOL/L (ref 21–32)
CREAT SERPL-MCNC: 0.59 MG/DL (ref 0.6–1.3)
DIAGNOSIS, 93000: NORMAL
DIFFERENTIAL METHOD BLD: ABNORMAL
EOSINOPHIL # BLD: 0.1 K/UL (ref 0–0.4)
EOSINOPHIL NFR BLD: 2 % (ref 0–5)
ERYTHROCYTE [DISTWIDTH] IN BLOOD BY AUTOMATED COUNT: 26.5 % (ref 11.6–14.5)
GLUCOSE SERPL-MCNC: 76 MG/DL (ref 74–99)
HCG SERPL-ACNC: <1 MIU/ML (ref 0–10)
HCT VFR BLD AUTO: 27.7 % (ref 35–45)
HGB BLD-MCNC: 7.6 G/DL (ref 12–16)
LYMPHOCYTES # BLD: 1.8 K/UL (ref 0.9–3.6)
LYMPHOCYTES NFR BLD: 29 % (ref 21–52)
MCH RBC QN AUTO: 17.6 PG (ref 24–34)
MCHC RBC AUTO-ENTMCNC: 27.4 G/DL (ref 31–37)
MCV RBC AUTO: 64.1 FL (ref 74–97)
MONOCYTES # BLD: 0.7 K/UL (ref 0.05–1.2)
MONOCYTES NFR BLD: 11 % (ref 3–10)
NEUTS SEG # BLD: 3.4 K/UL (ref 1.8–8)
NEUTS SEG NFR BLD: 57 % (ref 40–73)
P-R INTERVAL, ECG05: 146 MS
PLATELET # BLD AUTO: 322 K/UL (ref 135–420)
PLATELET COMMENTS,PCOM: ABNORMAL
POTASSIUM SERPL-SCNC: 4.1 MMOL/L (ref 3.5–5.5)
Q-T INTERVAL, ECG07: 336 MS
QRS DURATION, ECG06: 80 MS
QTC CALCULATION (BEZET), ECG08: 370 MS
RBC # BLD AUTO: 4.32 M/UL (ref 4.2–5.3)
RBC MORPH BLD: ABNORMAL
SODIUM SERPL-SCNC: 139 MMOL/L (ref 136–145)
SPECIMEN EXP DATE BLD: NORMAL
VENTRICULAR RATE, ECG03: 73 BPM
WBC # BLD AUTO: 6.1 K/UL (ref 4.6–13.2)

## 2020-07-28 PROCEDURE — 84702 CHORIONIC GONADOTROPIN TEST: CPT

## 2020-07-28 PROCEDURE — 93005 ELECTROCARDIOGRAM TRACING: CPT

## 2020-07-28 PROCEDURE — 36415 COLL VENOUS BLD VENIPUNCTURE: CPT

## 2020-07-28 PROCEDURE — 86900 BLOOD TYPING SEROLOGIC ABO: CPT

## 2020-07-28 PROCEDURE — 85025 COMPLETE CBC W/AUTO DIFF WBC: CPT

## 2020-07-28 PROCEDURE — 80048 BASIC METABOLIC PNL TOTAL CA: CPT

## 2020-07-28 NOTE — PROGRESS NOTES
Sandrita Miller is a 39 y.o. female presents in office for referred from Dr. Cecile Blanca     Chief Complaint   [facility]   [facility]     Dr. Referral by Dr. Cecile Blanca       Patient states was provided birth control but was unable to continue usage due to sickness. Patient states also taking tranexamic to stop bleeding. Patient states has been constantly bleeding for the month of 2020. Patient states has Hx of 1 pregnancy birth with . Patient states had some pelvic pain in midregion. Patient has a Hx of fibroids. Patient states has some weakness. Do you have any unusual vaginal bleeding, discharge or irritation? Yes     Do you have any changes in your urination or  bowel movements? No     Have you been experiencing any continuous or worsening abdominal pain? No     .  Visit Vitals  /77 (BP 1 Location: Right arm, BP Patient Position: Sitting)   Pulse 95   Temp 98.3 °F (36.8 °C) (Oral)   Resp 12   Ht 5' 3\" (1.6 m)   Wt 150 lb 6.4 oz (68.2 kg)   SpO2 100%   BMI 26.64 kg/m²         Health Maintenance Due   Topic Date Due    Lipid Screen  2018     1. Have you been to the ER, urgent care clinic since your last visit? Hospitalized since your last visit? No     2. Have you seen or consulted any other health care providers outside of the [address] [place] [address] since your last visit? Include any pap smears or colon screening.   Dr. Cecile Blanac     3 most recent [facility] Screens 2020   Little interest or pleasure in doing things Not at all   Feeling down, depressed, irritable, or hopeless Not at all   Total Score PHQ 2 0       Learning Assessment 2017   PRIMARY LEARNER Patient   HIGHEST LEVEL OF EDUCATION - PRIMARY LEARNER  SOME COLLEGE   BARRIERS PRIMARY LEARNER NONE   CO-LEARNER CAREGIVER No   PRIMARY LANGUAGE ENGLISH   LEARNER PREFERENCE PRIMARY DEMONSTRATION     -   ANSWERED BY patient   RELATIONSHIP SELF

## 2020-07-28 NOTE — LETTER
7/28/20 Patient: Brittany Garcia YOB: 1975 Date of Visit: 7/28/2020 Mariel Obando MD 
[address] [facility] [address] [place] [phone] VIA In Basket Dear Mariel Obando MD, Thank you for referring Ms. Jovana Mcgee to [facility] for evaluation. My notes for this consultation are attached. If you have questions, please do not hesitate to call me. I look forward to following your patient along with you. Sincerely, Tre Cadena MD

## 2020-07-31 ENCOUNTER — TELEPHONE (OUTPATIENT)
Dept: OBGYN CLINIC | Age: 45
End: 2020-07-31

## 2020-08-05 ENCOUNTER — HOSPITAL ENCOUNTER (OUTPATIENT)
Dept: PREADMISSION TESTING | Age: 45
Discharge: HOME | End: 2020-08-05
Payer: COMMERCIAL

## 2020-08-05 PROCEDURE — 87635 SARS-COV-2 COVID-19 AMP PRB: CPT

## 2020-08-06 ENCOUNTER — TELEPHONE (OUTPATIENT)
Dept: OBGYN CLINIC | Age: 45
End: 2020-08-06

## 2020-08-06 ENCOUNTER — TELEPHONE (OUTPATIENT)
Dept: ONCOLOGY | Age: 45
End: 2020-08-06

## 2020-08-06 LAB — SARS-COV-2, COV2NT: NOT DETECTED

## 2020-08-06 NOTE — TELEPHONE ENCOUNTER
I tried to call patient twice to speak with her but the phone reception was significantly impaired to the point where the patient could not hear a word I had to say- I tried a different phone and still no difference.

## 2020-08-06 NOTE — TELEPHONE ENCOUNTER
Contacted patient to start virtual visit, bad phone connection. Unable to speak with patient due to phone connection.

## 2020-08-07 ENCOUNTER — HOSPITAL ENCOUNTER (OUTPATIENT)
Dept: INFUSION THERAPY | Age: 45
Discharge: HOME | End: 2020-08-07
Payer: COMMERCIAL

## 2020-08-07 ENCOUNTER — OFFICE VISIT (OUTPATIENT)
Dept: ONCOLOGY | Age: 45
End: 2020-08-07

## 2020-08-07 ENCOUNTER — TELEPHONE (OUTPATIENT)
Dept: ONCOLOGY | Age: 45
End: 2020-08-07

## 2020-08-07 VITALS
RESPIRATION RATE: 18 BRPM | HEART RATE: 78 BPM | SYSTOLIC BLOOD PRESSURE: 114 MMHG | TEMPERATURE: 98.1 F | OXYGEN SATURATION: 100 % | DIASTOLIC BLOOD PRESSURE: 77 MMHG

## 2020-08-07 DIAGNOSIS — D50.0 ANEMIA DUE TO BLOOD LOSS, CHRONIC: Primary | ICD-10-CM

## 2020-08-07 DIAGNOSIS — Z71.89 ENCOUNTER FOR SURGICAL COUNSELING: Primary | ICD-10-CM

## 2020-08-07 PROCEDURE — 74011250637 HC RX REV CODE- 250/637: Performed by: OBSTETRICS & GYNECOLOGY

## 2020-08-07 PROCEDURE — 96366 THER/PROPH/DIAG IV INF ADDON: CPT

## 2020-08-07 PROCEDURE — 74011250636 HC RX REV CODE- 250/636: Performed by: OBSTETRICS & GYNECOLOGY

## 2020-08-07 PROCEDURE — 96365 THER/PROPH/DIAG IV INF INIT: CPT

## 2020-08-07 PROCEDURE — 74011000258 HC RX REV CODE- 258: Performed by: OBSTETRICS & GYNECOLOGY

## 2020-08-07 RX ORDER — DIPHENHYDRAMINE HYDROCHLORIDE 50 MG/ML
50 INJECTION, SOLUTION INTRAMUSCULAR; INTRAVENOUS AS NEEDED
Status: CANCELLED
Start: 2020-08-07

## 2020-08-07 RX ORDER — SODIUM CHLORIDE 9 MG/ML
10 INJECTION INTRAMUSCULAR; INTRAVENOUS; SUBCUTANEOUS AS NEEDED
Status: CANCELLED | OUTPATIENT
Start: 2020-08-07

## 2020-08-07 RX ORDER — ALBUTEROL SULFATE 0.83 MG/ML
2.5 SOLUTION RESPIRATORY (INHALATION) AS NEEDED
Status: CANCELLED
Start: 2020-08-07

## 2020-08-07 RX ORDER — SODIUM CHLORIDE 0.9 % (FLUSH) 0.9 %
10 SYRINGE (ML) INJECTION AS NEEDED
Status: DISPENSED | OUTPATIENT
Start: 2020-08-07 | End: 2020-08-07

## 2020-08-07 RX ORDER — ACETAMINOPHEN 325 MG/1
650 TABLET ORAL AS NEEDED
Status: CANCELLED
Start: 2020-08-07

## 2020-08-07 RX ORDER — SODIUM CHLORIDE 9 MG/ML
25 INJECTION, SOLUTION INTRAVENOUS CONTINUOUS
Status: DISPENSED | OUTPATIENT
Start: 2020-08-07 | End: 2020-08-07

## 2020-08-07 RX ORDER — DIPHENHYDRAMINE HYDROCHLORIDE 50 MG/ML
25 INJECTION, SOLUTION INTRAMUSCULAR; INTRAVENOUS ONCE
Status: CANCELLED
Start: 2020-08-07

## 2020-08-07 RX ORDER — ACETAMINOPHEN 325 MG/1
650 TABLET ORAL ONCE
Status: COMPLETED | OUTPATIENT
Start: 2020-08-07 | End: 2020-08-07

## 2020-08-07 RX ORDER — ACETAMINOPHEN 325 MG/1
650 TABLET ORAL ONCE
Status: CANCELLED
Start: 2020-08-07

## 2020-08-07 RX ORDER — HEPARIN 100 UNIT/ML
300-500 SYRINGE INTRAVENOUS AS NEEDED
Status: CANCELLED
Start: 2020-08-07

## 2020-08-07 RX ORDER — DIPHENHYDRAMINE HYDROCHLORIDE 50 MG/ML
25 INJECTION, SOLUTION INTRAMUSCULAR; INTRAVENOUS ONCE
Status: COMPLETED | OUTPATIENT
Start: 2020-08-07 | End: 2020-08-07

## 2020-08-07 RX ORDER — ONDANSETRON 2 MG/ML
8 INJECTION INTRAMUSCULAR; INTRAVENOUS AS NEEDED
Status: CANCELLED | OUTPATIENT
Start: 2020-08-07

## 2020-08-07 RX ORDER — SODIUM CHLORIDE 0.9 % (FLUSH) 0.9 %
10 SYRINGE (ML) INJECTION AS NEEDED
Status: CANCELLED
Start: 2020-08-07

## 2020-08-07 RX ORDER — DIPHENHYDRAMINE HYDROCHLORIDE 50 MG/ML
25 INJECTION, SOLUTION INTRAMUSCULAR; INTRAVENOUS AS NEEDED
Status: CANCELLED
Start: 2020-08-07

## 2020-08-07 RX ORDER — EPINEPHRINE 1 MG/ML
0.3 INJECTION, SOLUTION, CONCENTRATE INTRAVENOUS AS NEEDED
Status: CANCELLED | OUTPATIENT
Start: 2020-08-07

## 2020-08-07 RX ORDER — SODIUM CHLORIDE 9 MG/ML
25 INJECTION, SOLUTION INTRAVENOUS CONTINUOUS
Status: CANCELLED | OUTPATIENT
Start: 2020-08-07

## 2020-08-07 RX ORDER — HYDROCORTISONE SODIUM SUCCINATE 100 MG/2ML
100 INJECTION, POWDER, FOR SOLUTION INTRAMUSCULAR; INTRAVENOUS AS NEEDED
Status: CANCELLED | OUTPATIENT
Start: 2020-08-07

## 2020-08-07 RX ADMIN — ACETAMINOPHEN 650 MG: 325 TABLET, FILM COATED ORAL at 08:52

## 2020-08-07 RX ADMIN — SODIUM CHLORIDE 975 MG: 900 INJECTION, SOLUTION INTRAVENOUS at 11:20

## 2020-08-07 RX ADMIN — DIPHENHYDRAMINE HYDROCHLORIDE 25 MG: 50 INJECTION INTRAMUSCULAR; INTRAVENOUS at 08:53

## 2020-08-07 RX ADMIN — SODIUM CHLORIDE 25 MG: 900 INJECTION, SOLUTION INTRAVENOUS at 09:32

## 2020-08-07 NOTE — PROGRESS NOTES
Reviewed consent form for total abdominal hysterectomy, bilateral salpingectomies scheduled on 8/10/2020 at [facility] with an arrival time of 9:30 am. Patient was given information packet that included pre-op and post-op instructions as well as the scheduled date, start time, arrival time, and length of the surgery and signed the consent form. Patient expressed understanding and had no further questions. Patient was encouraged to call if they have questions later. The patient was counseled at length about the risks of enrico Covid-19 during their perioperative period and any recovery window from their procedure. The patient was made aware that enrico Covid-19  may worsen their prognosis for recovering from their procedure and lend to a higher morbidity and/or mortality risk. All material risks, benefits, and reasonable alternatives including postponing the procedure were discussed. The patient does wish to proceed with the procedure at this time.

## 2020-08-07 NOTE — PROGRESS NOTES
[facility] OPIC Progress Note    Date: 2020    Name: Judy Huerta    MRN: [id number]         : 1975    Infed Infusion    Ms. Mcgee to [facility], [facility], at 8387. Pt was assessed and education was provided. Ms. Corinne Crews vitals were reviewed and patient was observed for 5 minutes prior to treatment. Visit Vitals  /77 (BP 1 Location: Left arm, BP Patient Position: Sitting)   Pulse 78   Temp 98.1 °F (36.7 °C)   Resp 18   SpO2 100%   Breastfeeding No         20 g PIV placed in RAC x 1 attempt. PIV flushed easily and had brisk blood return. Pre-medications (Tylenol 650 mg and benadryl 25 mg IVP) were administered as ordered. Infed test dose 25 mg was initiated @ 100 ml/hr over 30 minutes. .    infed 975 mg was infused @125 ml/hr over 4 hours     VS stable and pt denied complaints of itching, lip/tongue/facial swelling, SOB, CP or other complaints. Ms. Venus Estrada tolerated the infusion, and had no complaints. VS remained stable. PIV flushed with NS 10 ml and removed. No bleeding or hematoma noted at site. Guaze and coban applied. Patient observed 30 minutes. Reviewed discharge instructions with patient, including expected side effects (abdominal cramping, nausea, changes in color of urine or feces) and signs of allergic reaction requiring medical attention (itching/hives/rashes, SOB, chest pain, lip/tongue/facial swelling). Patient given printed copy to take home. Patient verbalized understanding of discharge instructions. Patient armband removed and shredded. Ms. Venus Estrada was discharged from [place] [address] in stable condition at 1630. She is to follow up with MD as needed.     Hector Burkett RN  2020

## 2020-08-09 ENCOUNTER — ANESTHESIA EVENT (OUTPATIENT)
Dept: SURGERY | Age: 45
DRG: 742 | End: 2020-08-09
Payer: COMMERCIAL

## 2020-08-10 ENCOUNTER — HOSPITAL ENCOUNTER (INPATIENT)
Age: 45
LOS: 3 days | Discharge: HOME | DRG: 742 | End: 2020-08-13
Attending: OBSTETRICS & GYNECOLOGY | Admitting: OBSTETRICS & GYNECOLOGY
Payer: COMMERCIAL

## 2020-08-10 ENCOUNTER — ANESTHESIA (OUTPATIENT)
Dept: SURGERY | Age: 45
DRG: 742 | End: 2020-08-10
Payer: COMMERCIAL

## 2020-08-10 DIAGNOSIS — G89.18 POSTOPERATIVE PAIN: Primary | ICD-10-CM

## 2020-08-10 PROBLEM — D25.9 FIBROID UTERUS: Status: ACTIVE | Noted: 2020-08-10

## 2020-08-10 LAB
ERYTHROCYTE [DISTWIDTH] IN BLOOD BY AUTOMATED COUNT: 27.5 % (ref 11.6–14.5)
HCG UR QL: NEGATIVE
HCT VFR BLD AUTO: 25.8 % (ref 35–45)
HGB BLD-MCNC: 7.4 G/DL (ref 12–16)
MCH RBC QN AUTO: 17.3 PG (ref 24–34)
MCHC RBC AUTO-ENTMCNC: 28.7 G/DL (ref 31–37)
MCV RBC AUTO: 60.3 FL (ref 74–97)
PLATELET # BLD AUTO: 417 K/UL (ref 135–420)
RBC # BLD AUTO: 4.28 M/UL (ref 4.2–5.3)
WBC # BLD AUTO: 6 K/UL (ref 4.6–13.2)

## 2020-08-10 PROCEDURE — 77030026102 HC DEV TISS ENSEAL G2 J&J -F: Performed by: OBSTETRICS & GYNECOLOGY

## 2020-08-10 PROCEDURE — 74011000250 HC RX REV CODE- 250: Performed by: OBSTETRICS & GYNECOLOGY

## 2020-08-10 PROCEDURE — 86920 COMPATIBILITY TEST SPIN: CPT

## 2020-08-10 PROCEDURE — 36415 COLL VENOUS BLD VENIPUNCTURE: CPT

## 2020-08-10 PROCEDURE — 76060000034 HC ANESTHESIA 1.5 TO 2 HR: Performed by: OBSTETRICS & GYNECOLOGY

## 2020-08-10 PROCEDURE — 74011250637 HC RX REV CODE- 250/637: Performed by: OBSTETRICS & GYNECOLOGY

## 2020-08-10 PROCEDURE — 74011250636 HC RX REV CODE- 250/636: Performed by: OBSTETRICS & GYNECOLOGY

## 2020-08-10 PROCEDURE — 74011250636 HC RX REV CODE- 250/636: Performed by: NURSE ANESTHETIST, CERTIFIED REGISTERED

## 2020-08-10 PROCEDURE — 74011000272 HC RX REV CODE- 272: Performed by: OBSTETRICS & GYNECOLOGY

## 2020-08-10 PROCEDURE — 77030014008 HC SPNG HEMSTAT J&J -C: Performed by: OBSTETRICS & GYNECOLOGY

## 2020-08-10 PROCEDURE — 76210000016 HC OR PH I REC 1 TO 1.5 HR: Performed by: OBSTETRICS & GYNECOLOGY

## 2020-08-10 PROCEDURE — 81025 URINE PREGNANCY TEST: CPT

## 2020-08-10 PROCEDURE — 76010000153 HC OR TIME 1.5 TO 2 HR: Performed by: OBSTETRICS & GYNECOLOGY

## 2020-08-10 PROCEDURE — 85027 COMPLETE CBC AUTOMATED: CPT

## 2020-08-10 PROCEDURE — 0UT90ZZ RESECTION OF UTERUS, OPEN APPROACH: ICD-10-PCS | Performed by: OBSTETRICS & GYNECOLOGY

## 2020-08-10 PROCEDURE — 86900 BLOOD TYPING SEROLOGIC ABO: CPT

## 2020-08-10 PROCEDURE — C9290 INJ, BUPIVACAINE LIPOSOME: HCPCS | Performed by: OBSTETRICS & GYNECOLOGY

## 2020-08-10 PROCEDURE — 74011250636 HC RX REV CODE- 250/636: Performed by: SPECIALIST

## 2020-08-10 PROCEDURE — 77030020782 HC GWN BAIR PAWS FLX 3M -B: Performed by: OBSTETRICS & GYNECOLOGY

## 2020-08-10 PROCEDURE — 77030011264 HC ELECTRD BLD EXT COVD -A: Performed by: OBSTETRICS & GYNECOLOGY

## 2020-08-10 PROCEDURE — 77010033678 HC OXYGEN DAILY

## 2020-08-10 PROCEDURE — 77030040361 HC SLV COMPR DVT MDII -B: Performed by: OBSTETRICS & GYNECOLOGY

## 2020-08-10 PROCEDURE — 74011000258 HC RX REV CODE- 258: Performed by: OBSTETRICS & GYNECOLOGY

## 2020-08-10 PROCEDURE — 65270000029 HC RM PRIVATE

## 2020-08-10 PROCEDURE — 77030002966 HC SUT PDS J&J -A: Performed by: OBSTETRICS & GYNECOLOGY

## 2020-08-10 PROCEDURE — 74011250637 HC RX REV CODE- 250/637: Performed by: SPECIALIST

## 2020-08-10 PROCEDURE — 30233N1 TRANSFUSION OF NONAUTOLOGOUS RED BLOOD CELLS INTO PERIPHERAL VEIN, PERCUTANEOUS APPROACH: ICD-10-PCS | Performed by: OBSTETRICS & GYNECOLOGY

## 2020-08-10 PROCEDURE — P9016 RBC LEUKOCYTES REDUCED: HCPCS

## 2020-08-10 PROCEDURE — 74011000250 HC RX REV CODE- 250: Performed by: NURSE ANESTHETIST, CERTIFIED REGISTERED

## 2020-08-10 PROCEDURE — 77030040830 HC CATH URETH FOL MDII -A: Performed by: OBSTETRICS & GYNECOLOGY

## 2020-08-10 PROCEDURE — 77030031139 HC SUT VCRL2 J&J -A: Performed by: OBSTETRICS & GYNECOLOGY

## 2020-08-10 PROCEDURE — 0UT70ZZ RESECTION OF BILATERAL FALLOPIAN TUBES, OPEN APPROACH: ICD-10-PCS | Performed by: OBSTETRICS & GYNECOLOGY

## 2020-08-10 PROCEDURE — 74011000258 HC RX REV CODE- 258: Performed by: NURSE ANESTHETIST, CERTIFIED REGISTERED

## 2020-08-10 PROCEDURE — 88307 TISSUE EXAM BY PATHOLOGIST: CPT

## 2020-08-10 RX ORDER — SODIUM CHLORIDE, SODIUM LACTATE, POTASSIUM CHLORIDE, CALCIUM CHLORIDE 600; 310; 30; 20 MG/100ML; MG/100ML; MG/100ML; MG/100ML
125 INJECTION, SOLUTION INTRAVENOUS CONTINUOUS
Status: DISCONTINUED | OUTPATIENT
Start: 2020-08-10 | End: 2020-08-13 | Stop reason: HOSPADM

## 2020-08-10 RX ORDER — DEXAMETHASONE SODIUM PHOSPHATE 4 MG/ML
8 INJECTION, SOLUTION INTRA-ARTICULAR; INTRALESIONAL; INTRAMUSCULAR; INTRAVENOUS; SOFT TISSUE ONCE
Status: COMPLETED | OUTPATIENT
Start: 2020-08-10 | End: 2020-08-10

## 2020-08-10 RX ORDER — OXYCODONE AND ACETAMINOPHEN 5; 325 MG/1; MG/1
1 TABLET ORAL
Status: DISCONTINUED | OUTPATIENT
Start: 2020-08-10 | End: 2020-08-13 | Stop reason: HOSPADM

## 2020-08-10 RX ORDER — SODIUM CHLORIDE 9 MG/ML
250 INJECTION, SOLUTION INTRAVENOUS AS NEEDED
Status: DISCONTINUED | OUTPATIENT
Start: 2020-08-10 | End: 2020-08-10 | Stop reason: HOSPADM

## 2020-08-10 RX ORDER — ONDANSETRON 2 MG/ML
INJECTION INTRAMUSCULAR; INTRAVENOUS AS NEEDED
Status: DISCONTINUED | OUTPATIENT
Start: 2020-08-10 | End: 2020-08-10 | Stop reason: HOSPADM

## 2020-08-10 RX ORDER — FAMOTIDINE 10 MG/ML
20 INJECTION INTRAVENOUS ONCE
Status: COMPLETED | OUTPATIENT
Start: 2020-08-10 | End: 2020-08-10

## 2020-08-10 RX ORDER — KETOROLAC TROMETHAMINE 30 MG/ML
15 INJECTION, SOLUTION INTRAMUSCULAR; INTRAVENOUS
Status: DISCONTINUED | OUTPATIENT
Start: 2020-08-10 | End: 2020-08-11

## 2020-08-10 RX ORDER — CELECOXIB 100 MG/1
200 CAPSULE ORAL ONCE
Status: COMPLETED | OUTPATIENT
Start: 2020-08-10 | End: 2020-08-10

## 2020-08-10 RX ORDER — ACETAMINOPHEN 500 MG
500 TABLET ORAL
COMMUNITY

## 2020-08-10 RX ORDER — HEPARIN SODIUM 5000 [USP'U]/ML
5000 INJECTION, SOLUTION INTRAVENOUS; SUBCUTANEOUS ONCE
Status: COMPLETED | OUTPATIENT
Start: 2020-08-10 | End: 2020-08-10

## 2020-08-10 RX ORDER — FENTANYL CITRATE 50 UG/ML
INJECTION, SOLUTION INTRAMUSCULAR; INTRAVENOUS AS NEEDED
Status: DISCONTINUED | OUTPATIENT
Start: 2020-08-10 | End: 2020-08-10 | Stop reason: HOSPADM

## 2020-08-10 RX ORDER — PREGABALIN 25 MG/1
25 CAPSULE ORAL ONCE
Status: COMPLETED | OUTPATIENT
Start: 2020-08-10 | End: 2020-08-10

## 2020-08-10 RX ORDER — NEOSTIGMINE METHYLSULFATE 1 MG/ML
INJECTION, SOLUTION INTRAVENOUS AS NEEDED
Status: DISCONTINUED | OUTPATIENT
Start: 2020-08-10 | End: 2020-08-10 | Stop reason: HOSPADM

## 2020-08-10 RX ORDER — MAGNESIUM SULFATE 100 %
4 CRYSTALS MISCELLANEOUS AS NEEDED
Status: DISCONTINUED | OUTPATIENT
Start: 2020-08-10 | End: 2020-08-10 | Stop reason: HOSPADM

## 2020-08-10 RX ORDER — DEXTROSE MONOHYDRATE 100 MG/ML
125-250 INJECTION, SOLUTION INTRAVENOUS AS NEEDED
Status: DISCONTINUED | OUTPATIENT
Start: 2020-08-10 | End: 2020-08-10 | Stop reason: HOSPADM

## 2020-08-10 RX ORDER — CEFAZOLIN SODIUM 2 G/50ML
2 SOLUTION INTRAVENOUS ONCE
Status: COMPLETED | OUTPATIENT
Start: 2020-08-10 | End: 2020-08-10

## 2020-08-10 RX ORDER — ONDANSETRON 2 MG/ML
4 INJECTION INTRAMUSCULAR; INTRAVENOUS ONCE
Status: DISCONTINUED | OUTPATIENT
Start: 2020-08-10 | End: 2020-08-10 | Stop reason: HOSPADM

## 2020-08-10 RX ORDER — MECLIZINE HCL 12.5 MG 12.5 MG/1
25 TABLET ORAL ONCE
Status: COMPLETED | OUTPATIENT
Start: 2020-08-10 | End: 2020-08-10

## 2020-08-10 RX ORDER — KETOROLAC TROMETHAMINE 15 MG/ML
INJECTION, SOLUTION INTRAMUSCULAR; INTRAVENOUS AS NEEDED
Status: DISCONTINUED | OUTPATIENT
Start: 2020-08-10 | End: 2020-08-10 | Stop reason: HOSPADM

## 2020-08-10 RX ORDER — MIDAZOLAM HYDROCHLORIDE 1 MG/ML
INJECTION, SOLUTION INTRAMUSCULAR; INTRAVENOUS AS NEEDED
Status: DISCONTINUED | OUTPATIENT
Start: 2020-08-10 | End: 2020-08-10 | Stop reason: HOSPADM

## 2020-08-10 RX ORDER — OXYCODONE AND ACETAMINOPHEN 5; 325 MG/1; MG/1
2 TABLET ORAL
Status: DISCONTINUED | OUTPATIENT
Start: 2020-08-10 | End: 2020-08-13 | Stop reason: HOSPADM

## 2020-08-10 RX ORDER — GLYCOPYRROLATE 0.2 MG/ML
INJECTION INTRAMUSCULAR; INTRAVENOUS AS NEEDED
Status: DISCONTINUED | OUTPATIENT
Start: 2020-08-10 | End: 2020-08-10 | Stop reason: HOSPADM

## 2020-08-10 RX ORDER — SODIUM CHLORIDE 9 MG/ML
125 INJECTION, SOLUTION INTRAVENOUS CONTINUOUS
Status: DISCONTINUED | OUTPATIENT
Start: 2020-08-10 | End: 2020-08-10 | Stop reason: HOSPADM

## 2020-08-10 RX ORDER — LIDOCAINE HYDROCHLORIDE 20 MG/ML
INJECTION, SOLUTION EPIDURAL; INFILTRATION; INTRACAUDAL; PERINEURAL AS NEEDED
Status: DISCONTINUED | OUTPATIENT
Start: 2020-08-10 | End: 2020-08-10 | Stop reason: HOSPADM

## 2020-08-10 RX ORDER — FENTANYL CITRATE 50 UG/ML
25 INJECTION, SOLUTION INTRAMUSCULAR; INTRAVENOUS AS NEEDED
Status: DISCONTINUED | OUTPATIENT
Start: 2020-08-10 | End: 2020-08-10 | Stop reason: HOSPADM

## 2020-08-10 RX ORDER — KETAMINE HYDROCHLORIDE 10 MG/ML
INJECTION, SOLUTION INTRAMUSCULAR; INTRAVENOUS AS NEEDED
Status: DISCONTINUED | OUTPATIENT
Start: 2020-08-10 | End: 2020-08-10 | Stop reason: HOSPADM

## 2020-08-10 RX ORDER — ONDANSETRON 4 MG/1
4 TABLET, ORALLY DISINTEGRATING ORAL ONCE
Status: COMPLETED | OUTPATIENT
Start: 2020-08-10 | End: 2020-08-10

## 2020-08-10 RX ORDER — HYDROMORPHONE HYDROCHLORIDE 2 MG/ML
0.5 INJECTION, SOLUTION INTRAMUSCULAR; INTRAVENOUS; SUBCUTANEOUS
Status: DISCONTINUED | OUTPATIENT
Start: 2020-08-10 | End: 2020-08-13 | Stop reason: HOSPADM

## 2020-08-10 RX ORDER — DIPHENHYDRAMINE HCL 25 MG
25 CAPSULE ORAL
Status: DISCONTINUED | OUTPATIENT
Start: 2020-08-10 | End: 2020-08-13 | Stop reason: HOSPADM

## 2020-08-10 RX ORDER — ROCURONIUM BROMIDE 10 MG/ML
INJECTION, SOLUTION INTRAVENOUS AS NEEDED
Status: DISCONTINUED | OUTPATIENT
Start: 2020-08-10 | End: 2020-08-10 | Stop reason: HOSPADM

## 2020-08-10 RX ORDER — SODIUM CHLORIDE 0.9 % (FLUSH) 0.9 %
5-40 SYRINGE (ML) INJECTION EVERY 8 HOURS
Status: DISCONTINUED | OUTPATIENT
Start: 2020-08-10 | End: 2020-08-13 | Stop reason: HOSPADM

## 2020-08-10 RX ORDER — CEFAZOLIN SODIUM 2 G/50ML
2 SOLUTION INTRAVENOUS EVERY 8 HOURS
Status: COMPLETED | OUTPATIENT
Start: 2020-08-10 | End: 2020-08-11

## 2020-08-10 RX ORDER — ENOXAPARIN SODIUM 100 MG/ML
40 INJECTION SUBCUTANEOUS EVERY 24 HOURS
Status: DISCONTINUED | OUTPATIENT
Start: 2020-08-11 | End: 2020-08-13 | Stop reason: HOSPADM

## 2020-08-10 RX ORDER — SODIUM CHLORIDE 0.9 % (FLUSH) 0.9 %
5-40 SYRINGE (ML) INJECTION AS NEEDED
Status: DISCONTINUED | OUTPATIENT
Start: 2020-08-10 | End: 2020-08-13 | Stop reason: HOSPADM

## 2020-08-10 RX ORDER — NALOXONE HYDROCHLORIDE 0.4 MG/ML
0.1 INJECTION, SOLUTION INTRAMUSCULAR; INTRAVENOUS; SUBCUTANEOUS AS NEEDED
Status: DISCONTINUED | OUTPATIENT
Start: 2020-08-10 | End: 2020-08-10 | Stop reason: HOSPADM

## 2020-08-10 RX ORDER — ONDANSETRON 2 MG/ML
4 INJECTION INTRAMUSCULAR; INTRAVENOUS
Status: DISCONTINUED | OUTPATIENT
Start: 2020-08-10 | End: 2020-08-13 | Stop reason: HOSPADM

## 2020-08-10 RX ORDER — FENTANYL CITRATE 50 UG/ML
50 INJECTION, SOLUTION INTRAMUSCULAR; INTRAVENOUS
Status: DISCONTINUED | OUTPATIENT
Start: 2020-08-10 | End: 2020-08-10 | Stop reason: HOSPADM

## 2020-08-10 RX ORDER — HYDROMORPHONE HYDROCHLORIDE 2 MG/ML
INJECTION, SOLUTION INTRAMUSCULAR; INTRAVENOUS; SUBCUTANEOUS AS NEEDED
Status: DISCONTINUED | OUTPATIENT
Start: 2020-08-10 | End: 2020-08-10 | Stop reason: HOSPADM

## 2020-08-10 RX ORDER — PROPOFOL 10 MG/ML
INJECTION, EMULSION INTRAVENOUS AS NEEDED
Status: DISCONTINUED | OUTPATIENT
Start: 2020-08-10 | End: 2020-08-10 | Stop reason: HOSPADM

## 2020-08-10 RX ORDER — NALOXONE HYDROCHLORIDE 0.4 MG/ML
0.4 INJECTION, SOLUTION INTRAMUSCULAR; INTRAVENOUS; SUBCUTANEOUS AS NEEDED
Status: DISCONTINUED | OUTPATIENT
Start: 2020-08-10 | End: 2020-08-13 | Stop reason: HOSPADM

## 2020-08-10 RX ORDER — OXYCODONE AND ACETAMINOPHEN 5; 325 MG/1; MG/1
1 TABLET ORAL AS NEEDED
Status: DISCONTINUED | OUTPATIENT
Start: 2020-08-10 | End: 2020-08-10 | Stop reason: HOSPADM

## 2020-08-10 RX ORDER — ACETAMINOPHEN 500 MG
1000 TABLET ORAL ONCE
Status: COMPLETED | OUTPATIENT
Start: 2020-08-10 | End: 2020-08-10

## 2020-08-10 RX ADMIN — KETOROLAC TROMETHAMINE 15 MG: 30 INJECTION, SOLUTION INTRAMUSCULAR at 17:54

## 2020-08-10 RX ADMIN — LIDOCAINE HYDROCHLORIDE 100 MG: 20 INJECTION, SOLUTION EPIDURAL; INFILTRATION; INTRACAUDAL; PERINEURAL at 10:57

## 2020-08-10 RX ADMIN — PREGABALIN 25 MG: 25 CAPSULE ORAL at 09:46

## 2020-08-10 RX ADMIN — CEFAZOLIN SODIUM 2 G: 2 SOLUTION INTRAVENOUS at 11:01

## 2020-08-10 RX ADMIN — PHENYLEPHRINE HYDROCHLORIDE 100 MCG: 10 INJECTION INTRAVENOUS at 11:34

## 2020-08-10 RX ADMIN — ONDANSETRON 4 MG: 4 TABLET, ORALLY DISINTEGRATING ORAL at 09:46

## 2020-08-10 RX ADMIN — SODIUM CHLORIDE, SODIUM LACTATE, POTASSIUM CHLORIDE, AND CALCIUM CHLORIDE: 600; 310; 30; 20 INJECTION, SOLUTION INTRAVENOUS at 11:12

## 2020-08-10 RX ADMIN — MECLIZINE 25 MG: 12.5 TABLET ORAL at 09:46

## 2020-08-10 RX ADMIN — KETAMINE HYDROCHLORIDE 30 MG: 10 INJECTION, SOLUTION INTRAMUSCULAR; INTRAVENOUS at 11:06

## 2020-08-10 RX ADMIN — OXYCODONE HYDROCHLORIDE AND ACETAMINOPHEN 1 TABLET: 5; 325 TABLET ORAL at 16:11

## 2020-08-10 RX ADMIN — ROCURONIUM BROMIDE 10 MG: 10 INJECTION, SOLUTION INTRAVENOUS at 11:43

## 2020-08-10 RX ADMIN — DEXAMETHASONE SODIUM PHOSPHATE 8 MG: 4 INJECTION, SOLUTION INTRAMUSCULAR; INTRAVENOUS at 09:56

## 2020-08-10 RX ADMIN — HYDROMORPHONE HYDROCHLORIDE 0.5 MG: 2 INJECTION, SOLUTION INTRAMUSCULAR; INTRAVENOUS; SUBCUTANEOUS at 12:12

## 2020-08-10 RX ADMIN — PROPOFOL 200 MG: 10 INJECTION, EMULSION INTRAVENOUS at 10:57

## 2020-08-10 RX ADMIN — ONDANSETRON HYDROCHLORIDE 4 MG: 2 INJECTION INTRAMUSCULAR; INTRAVENOUS at 12:12

## 2020-08-10 RX ADMIN — CELECOXIB 200 MG: 100 CAPSULE ORAL at 09:46

## 2020-08-10 RX ADMIN — SODIUM CHLORIDE, SODIUM LACTATE, POTASSIUM CHLORIDE, AND CALCIUM CHLORIDE 125 ML/HR: 600; 310; 30; 20 INJECTION, SOLUTION INTRAVENOUS at 13:14

## 2020-08-10 RX ADMIN — OXYCODONE HYDROCHLORIDE AND ACETAMINOPHEN 2 TABLET: 5; 325 TABLET ORAL at 20:33

## 2020-08-10 RX ADMIN — HEPARIN SODIUM 5000 UNITS: 5000 INJECTION INTRAVENOUS; SUBCUTANEOUS at 09:58

## 2020-08-10 RX ADMIN — KETOROLAC TROMETHAMINE 15 MG: 15 INJECTION, SOLUTION INTRAMUSCULAR; INTRAVENOUS at 12:09

## 2020-08-10 RX ADMIN — FENTANYL CITRATE 100 MCG: 50 INJECTION, SOLUTION INTRAMUSCULAR; INTRAVENOUS at 10:57

## 2020-08-10 RX ADMIN — SODIUM CHLORIDE, SODIUM LACTATE, POTASSIUM CHLORIDE, AND CALCIUM CHLORIDE: 600; 310; 30; 20 INJECTION, SOLUTION INTRAVENOUS at 11:56

## 2020-08-10 RX ADMIN — ACETAMINOPHEN 1000 MG: 500 TABLET ORAL at 09:46

## 2020-08-10 RX ADMIN — FAMOTIDINE 20 MG: 10 INJECTION, SOLUTION INTRAVENOUS at 09:56

## 2020-08-10 RX ADMIN — PHENYLEPHRINE HYDROCHLORIDE 100 MCG: 10 INJECTION INTRAVENOUS at 11:47

## 2020-08-10 RX ADMIN — PHENYLEPHRINE HYDROCHLORIDE 100 MCG: 10 INJECTION INTRAVENOUS at 11:27

## 2020-08-10 RX ADMIN — PHENYLEPHRINE HYDROCHLORIDE 100 MCG: 10 INJECTION INTRAVENOUS at 11:40

## 2020-08-10 RX ADMIN — Medication 3 MG: at 12:14

## 2020-08-10 RX ADMIN — CEFAZOLIN SODIUM 2 G: 2 SOLUTION INTRAVENOUS at 19:33

## 2020-08-10 RX ADMIN — MIDAZOLAM 2 MG: 1 INJECTION INTRAMUSCULAR; INTRAVENOUS at 10:44

## 2020-08-10 RX ADMIN — GLYCOPYRROLATE 0.4 MG: 0.2 INJECTION INTRAMUSCULAR; INTRAVENOUS at 12:16

## 2020-08-10 RX ADMIN — ROCURONIUM BROMIDE 40 MG: 10 INJECTION, SOLUTION INTRAVENOUS at 10:57

## 2020-08-10 RX ADMIN — SODIUM CHLORIDE, SODIUM LACTATE, POTASSIUM CHLORIDE, AND CALCIUM CHLORIDE 125 ML/HR: 600; 310; 30; 20 INJECTION, SOLUTION INTRAVENOUS at 19:30

## 2020-08-10 RX ADMIN — FENTANYL CITRATE 50 MCG: 50 INJECTION, SOLUTION INTRAMUSCULAR; INTRAVENOUS at 12:57

## 2020-08-10 RX ADMIN — SODIUM CHLORIDE, SODIUM LACTATE, POTASSIUM CHLORIDE, AND CALCIUM CHLORIDE 125 ML/HR: 600; 310; 30; 20 INJECTION, SOLUTION INTRAVENOUS at 09:50

## 2020-08-10 NOTE — ROUTINE PROCESS
1930 
Bedside and Verbal shift change report given to Errol Carlson RN (oncoming nurse) by Serafin Olmedo RN (offgoing nurse). Report included the following information SBAR, Kardex and MAR.

## 2020-08-10 NOTE — PROGRESS NOTES
Problem: Falls - Risk of  Goal: *Absence of Falls  Description: Document Evelyn Hanston Fall Risk and appropriate interventions in the flowsheet.   Outcome: Progressing Towards Goal  Note: Fall Risk Interventions:            Medication Interventions: Teach patient to arise slowly, Patient to call before getting OOB

## 2020-08-10 NOTE — PROGRESS NOTES
Bedside and verbal report given to DIOR Clemente RN (oncoming nurse) by Celeste Reyes RN  (off going nurse). Report included the following information SBAR, Kardex, OR Summary, Intake/Output, and MAR. Pt had one complaint of pain this shift, treated with Percocet per MAR. Bedside and verbal report given by (off going nurse) DIOR Clemente RN to (oncoming nurse) Thelma Arboleda RN. Report included the following information SBAR, Kardex, OR Summary, Intake/Output, and MAR.

## 2020-08-10 NOTE — OP NOTES
[facility] [place]  OPERATIVE REPORT    Name:  Joey Lombardo  MR#:   [id number]  :  1975  ACCOUNT #:  [de-identified]  DATE OF SERVICE:  08/10/2020    PREOPERATIVE DIAGNOSES:  Fibroid uterus, menorrhagia, chronic blood loss anemia. POSTOPERATIVE DIAGNOSES:  Fibroid uterus, menorrhagia, chronic blood loss anemia. PROCEDURES PERFORMED:  Exploratory laparotomy, total abdominal hysterectomy, bilateral salpingectomies. SURGEON:  Sherita Buchanan DO    ASSISTANT:  Estela Etienne. ASSISTANT TASK:  Retraction. ANESTHESIA:  General.    FLUIDS:  2300 mL of crystalloid, 1 unit of packed red blood cells. URINE OUTPUT:  550 mL. COMPLICATIONS:  None. SPECIMENS REMOVED:  Uterus, cervix, bilateral tubes. IMPLANTS:  none. ESTIMATED BLOOD LOSS:  300 mL. FINDINGS:  Upon laparotomy, she had an enlarged 18-week multifibroid uterus with normal tubes and ovaries bilaterally. There were some dense adhesions involving the uterus to the anterior abdominal wall as well as a fair amount of adhesions involving the bladder to a lower uterine segment cervical fibroid. INDICATIONS:  The patient is a 72-year-old who has a history of fibroids and abnormal uterine bleeding. She had recently presented to her GYN and was found to have a hemoglobin of 5 and underwent transfusion of 1 unit of packed red blood cells. She was also placed on Lysteda. She desired definitive surgical management. The patient presents today for that surgery. PROCEDURE:  The patient was taken to the operating room where general anesthesia was obtained without difficulty. She was placed in dorsal lithotomy position, prepped and draped in normal sterile fashion. A surgical time-out was taken by the entire surgical team.  A Serrano catheter was placed. At this point, a midline laparotomy was made from the pubic symphysis to around the umbilicus. This was carried down to underlying fascia with Bovie.   The fascia was then incised. The fascial incision was extended superior and inferiorly. The rectus muscles were  in the midline. The peritoneum was identified and entered sharply. The peritoneal incision was then extended superior and inferiorly. As we were moving inferiorly, there were some dense adhesions of the uterus to the anterior abdominal wall. These were taken down using the Enseal device and sharply with scissors. Once this was accomplished, there were some adhesions of the bladder to the anterior uterus and some lower uterine segment fibroids. These adhesions were taken down sharply, which allowed us to mobilize the bladder off of the uterus. At this point, attention was turned to the right side where the uterus was partially delivered through the incision and the right fallopian tube was elevated and the underlying mesosalpinx was divided using the Enseal device. The right utero-ovarian ligament was then sealed and divided using the LigaSure device and the broad ligament attachment was divided using the Enseal device. The ovary was then placed in the right lower quadrant. This was then repeated on the left side in the same fashion. At this point, attention was turned posteriorly where the uterosacrals were begun to be divided using monopolar cautery. This allowed us to mobilize the larger lower uterine segment fibroid out of the pelvis. In doing so, we were able to create a bladder flap using sharp and blunt dissection. The uterine arteries were then sealed and divided using the Enseal device. A sponge stick was then placed in the vagina as we were unable to palpate the cervix, and using the sponge stick, further development of the bladder flap was undertaken. The cardinal ligaments were then sealed and divided.   An anterior colpotomy was then made using monopolar cautery, and this was carried circumferentially around the cervix with the Enseal device, and the specimen was handed off for permanent specimen. At this point, there was some bleeding noted from our left uterine pedicle; therefore, a ureterolysis was performed to ensure the ureter was lateral to this bleeder, and this was sealed using the Enseal device. The vaginal cuff was then closed with #1 Vicryl figure-of-eight stitches. There was some bleeding noted from her bladder flap. This was addressed using a 2-0 Vicryl figure-of-eight stitch and monopolar cautery. A piece of Nu-Knit was then placed over this area as well as along the left side of the cardinal ligament and this offered good hemostasis. At this point, the Bookwalter was removed and all moist laparotomy sponges were removed. The fascial incision was reapproximated with a #1 PDS in a modified Smead-Rosa fashion. The skin was closed with staples. Exparel was injected at the conclusion. The patient tolerated the procedure well. Sponge, needle, and instrument counts were correct x2, and the patient was taken to the Recovery Room in stable condition.       [address], DO      [id number]  D:  08/10/2020 12:27  T:  08/10/2020 14:43  JOB #:  [id number]

## 2020-08-10 NOTE — INTERVAL H&P NOTE
Update History & Physical 
 
The Patient's History and Physical of July 28,  was reviewed with the patient and I examined the patient. There was no change. The surgical site was confirmed by the patient and me. Plan:  The risk, benefits, expected outcome, and alternative to the recommended procedure have been discussed with the patient. Patient understands and wants to proceed with the procedure.  
 
Electronically signed by Shady Barrett MD on 8/10/2020 at 10:36 AM

## 2020-08-10 NOTE — PROGRESS NOTES
1444  Received client from PACU in satisfactory condition. Client is a pt of Dr. Aman Decker. Pt had a TAHBSO   today. Client is A/O X 4. Client is calm and cooperative. Clients PERRLA. Denies numbness or tingling to any extremity. With + Radial,Posterior Tibial and Dorsalis Pedis pulses. Capillary Refill less than 3 seconds. Skin is warm , dry and skin color is appropriate to race. Client is negative for JVD. Bibasilar breath sounds clear. No use of accessory muscles. Bowel sounds hypoactive to all quadrants. Abdomen is soft and non-tender. Client has biggs cath draining clear yellow urine. . Client has a Optifoam dressing to the mid abdomen . Dressing has smcant amt of reddish bloody drainage. Pt has peripad  Without any d/c Sequential compression device applied. Client has RFA 18 gauge PIV present and running LR at 125 ml/hr. Clients pain is 7/10 on 0-10 scale. Client oriented to call bell use as well as bed use. Client oriented to phone and how to order meals. Call bell within reach. Bed in low position. Three side rails up. Armbands/ fall risk band intact. Dual skin check done with Slava Carranza RN. No skin breakdown noted. 1419   Pt is awake, alert, oriented x4. Pain level 7/10. Medicated with Percoet 5/325 mg po for pain level 7/10. Pt had clear liquids for late lunch without nausea or vomiting. Requested regular diet. Advanced diet to regular at dinner. 1756   Pt medicatedwith Toradol 15 mg Iv for pain level 4/10.   1835   Pt was assisted  to sit at edge of bed. Dinner served. 1930   Pt is back in bed.

## 2020-08-10 NOTE — ANESTHESIA POSTPROCEDURE EVALUATION
Procedure(s):  TOTAL ABDOMINAL HYSTERECTOMY,BILATERAL SALPINGECTOMIES. general    Anesthesia Post Evaluation        Comments: Post-Anesthesia Evaluation and Assessment    Cardiovascular Function/Vital Signs  /58   Pulse (!) 54   Temp 36.5 °C (97.7 °F)   Resp 11   Ht 5' 3\" (1.6 m)   Wt 67.8 kg (149 lb 7 oz)   SpO2 100%   BMI 26.47 kg/m²     Patient is status post Procedure(s):  TOTAL ABDOMINAL HYSTERECTOMY,BILATERAL SALPINGECTOMIES. Nausea/Vomiting: Controlled. Postoperative hydration reviewed and adequate. Pain:  Pain Scale 1: FLACC (08/10/20 1345)  Pain Intensity 1: 0 (08/10/20 1345)   Managed. Neurological Status:   Neuro (WDL): Within Defined Limits (08/10/20 0951)   At baseline. Mental Status and Level of Consciousness: Arousable. Pulmonary Status:   O2 Device: Nasal cannula (08/10/20 1345)   Adequate oxygenation and airway patent. Complications related to anesthesia: None    Post-anesthesia assessment completed. No concerns. Patient has met all discharge requirements. Signed By: Dez Montiel MD    August 10, 2020                     INITIAL Post-op Vital signs:   Vitals Value Taken Time   BP 96/64 8/10/2020  1:55 PM   Temp     Pulse 57 8/10/2020  1:56 PM   Resp 11 8/10/2020  1:56 PM   SpO2 100 % 8/10/2020  1:47 PM   Vitals shown include unvalidated device data.

## 2020-08-10 NOTE — ROUTINE PROCESS
1200 Caesar Salguero TRANSFER - IN REPORT: 
 
Verbal report received from Ольга Palma RN(name) on Jovana Mcgee  being received from PACU(unit) for routine post - op Report consisted of patients Situation, Background, Assessment and  
Recommendations(SBAR). Information from the following report(s) SBAR, Kardex and MAR was reviewed with the receiving nurse. Opportunity for questions and clarification was provided. Assessment completed upon patients arrival to unit and care assumed.

## 2020-08-10 NOTE — ANESTHESIA PREPROCEDURE EVALUATION
Relevant Problems   No relevant active problems       Anesthetic History   No history of anesthetic complications            Review of Systems / Medical History  Patient summary reviewed, nursing notes reviewed and pertinent labs reviewed    Pulmonary          Smoker      Comments: None for month   Neuro/Psych   Within defined limits           Cardiovascular                  Exercise tolerance: >4 METS     GI/Hepatic/Renal  Within defined limits              Endo/Other        Anemia     Other Findings            Physical Exam    Airway  Mallampati: I  TM Distance: 4 - 6 cm  Neck ROM: normal range of motion   Mouth opening: Normal     Cardiovascular    Rhythm: regular  Rate: normal         Dental      Comments: Cap in left upper tooth   Pulmonary  Breath sounds clear to auscultation               Abdominal  GI exam deferred       Other Findings            Anesthetic Plan    ASA: 2  Anesthesia type: general          Induction: Intravenous  Anesthetic plan and risks discussed with: Patient

## 2020-08-10 NOTE — BRIEF OP NOTE
Brief Postoperative Note    Patient: Yvonne Rothman  YOB: 1975  MRN: [id number]    Date of Procedure: 8/10/2020     Pre-Op Diagnosis: FIBROIDS,CHRONIC BLOOD LOSS,ANEMIA    Post-Op Diagnosis: Same as preoperative diagnosis. Procedure(s):  TOTAL ABDOMINAL HYSTERECTOMY,BILATERAL SALPINGECTOMIES.  Lysis of adhesions    Surgeon(s):  Saji Martinez MD    Surgical Assistant: None    Anesthesia: General     Estimated Blood Loss (mL): 077     Complications: None    Specimens:   ID Type Source Tests Collected by Time Destination   1 : CERVIX, UTERUS, BILATERAL FALLOPIAN TUBES Preservative Uterus with Bilateral Fallopian Tubes  Saji Martinez MD 8/10/2020 1216 Pathology        Implants: * No implants in log *    Drains: * No LDAs found *    Findings: 18 wk uterus with multiple fibroids with adhesions involving anterior abdominal wall and bladder    Electronically Signed by Katina Lake MD on 8/10/2020 at 12:20 PM

## 2020-08-10 NOTE — PERIOP NOTES
TRANSFER - OUT REPORT:    Verbal report given to Miriam(name) on Jovana Mcgee  being transferred to (unit) for routine post - op       Report consisted of patients Situation, Background, Assessment and   Recommendations(SBAR). Information from the following report(s) SBAR, Kardex, OR Summary, Procedure Summary, Intake/Output and MAR was reviewed with the receiving nurse. Lines:   Peripheral IV 08/10/20 Anterior;Right Forearm (Active)   Site Assessment Clean, dry, & intact 08/10/20 1316   Phlebitis Assessment 0 08/10/20 1316   Infiltration Assessment 0 08/10/20 1316   Dressing Status Clean, dry, & intact 08/10/20 1316   Dressing Type Transparent;Tape 08/10/20 1316   Hub Color/Line Status Green; Infusing 08/10/20 1316   Alcohol Cap Used No 08/10/20 0949        Opportunity for questions and clarification was provided.       Patient transported with:   Registered Nurse  Tech

## 2020-08-10 NOTE — PROGRESS NOTES
Transition of Care (ALONSO) Plan:    Home with physician follow up    Chart reviewed. Pt admitted for an elective surgical procedure (TOTAL ABDOMINAL HYSTERECTOMY,BILATERAL SALPINGECTOMIES). Pt is independent. Please encourage ambulation. No transition of care needs identified at this time. Anticipate pt will be medically stable for discharge within the next 24-48 hours with physician follow up. CM available to assist as needed. ALONSO Transportation:   How is patient being transported at discharge? Family/Friend      When? Once cleared by physician     Is transport scheduled? N/A      Follow-up appointment and transportation:   PCP/Specialist?  See AVS for Appointment         Who is transporting to the follow-up appointment? Self/Family/Friend      Is transport for follow up appointment scheduled? N/A    Communication plan (with patient/family): Who is being called? Patient or Next of Kin? Responsible party? Patient      What number(s) is to be used? See Facesheet      What service provider is calling for [facility] services? When are they calling? Readmission Risk? (Green/Low; Yellow/Moderate; Red/High):  Green    Care Management Interventions  Mode of Transport at Discharge:  Other (see comment)(Family)  Transition of Care Consult (CM Consult): Discharge Planning  Health Maintenance Reviewed: Yes  Current Support Network: Family Lives Nearby  Confirm Follow Up Transport: Self  The Plan for Transition of Care is Related to the Following Treatment Goals : Home with physician follow up   Discharge Location  Discharge Placement: Home with family assistance

## 2020-08-10 NOTE — PERIOP NOTES
Reviewed PTA medication list with patient/caregiver and patient/caregiver denies any additional medications. Patient admits to having a responsible adult care for them for at least 24 hours after surgery.     Dual skin assessment completed by Jessie Harding RN and Stephanie Castro RN.

## 2020-08-11 LAB
ANION GAP SERPL CALC-SCNC: 7 MMOL/L (ref 3–18)
BASOPHILS # BLD: 0 K/UL (ref 0–0.1)
BASOPHILS NFR BLD: 0 % (ref 0–2)
BUN SERPL-MCNC: 5 MG/DL (ref 7–18)
BUN/CREAT SERPL: 8 (ref 12–20)
CALCIUM SERPL-MCNC: 8.2 MG/DL (ref 8.5–10.1)
CHLORIDE SERPL-SCNC: 107 MMOL/L (ref 100–111)
CO2 SERPL-SCNC: 25 MMOL/L (ref 21–32)
CREAT SERPL-MCNC: 0.61 MG/DL (ref 0.6–1.3)
DIFFERENTIAL METHOD BLD: ABNORMAL
EOSINOPHIL # BLD: 0 K/UL (ref 0–0.4)
EOSINOPHIL NFR BLD: 0 % (ref 0–5)
ERYTHROCYTE [DISTWIDTH] IN BLOOD BY AUTOMATED COUNT: 29.6 % (ref 11.6–14.5)
GLUCOSE SERPL-MCNC: 104 MG/DL (ref 74–99)
HCT VFR BLD AUTO: 23.2 % (ref 35–45)
HGB BLD-MCNC: 7 G/DL (ref 12–16)
LYMPHOCYTES # BLD: 1.7 K/UL (ref 0.9–3.6)
LYMPHOCYTES NFR BLD: 10 % (ref 21–52)
MCH RBC QN AUTO: 19.7 PG (ref 24–34)
MCHC RBC AUTO-ENTMCNC: 30.2 G/DL (ref 31–37)
MCV RBC AUTO: 65.4 FL (ref 74–97)
MONOCYTES # BLD: 1.4 K/UL (ref 0.05–1.2)
MONOCYTES NFR BLD: 8 % (ref 3–10)
NEUTS SEG # BLD: 14.2 K/UL (ref 1.8–8)
NEUTS SEG NFR BLD: 82 % (ref 40–73)
PLATELET # BLD AUTO: 338 K/UL (ref 135–420)
PLATELET COMMENTS,PCOM: ABNORMAL
POTASSIUM SERPL-SCNC: 4 MMOL/L (ref 3.5–5.5)
RBC # BLD AUTO: 3.55 M/UL (ref 4.2–5.3)
RBC MORPH BLD: ABNORMAL
SODIUM SERPL-SCNC: 139 MMOL/L (ref 136–145)
WBC # BLD AUTO: 17.3 K/UL (ref 4.6–13.2)

## 2020-08-11 PROCEDURE — 97161 PT EVAL LOW COMPLEX 20 MIN: CPT

## 2020-08-11 PROCEDURE — 80048 BASIC METABOLIC PNL TOTAL CA: CPT

## 2020-08-11 PROCEDURE — P9016 RBC LEUKOCYTES REDUCED: HCPCS

## 2020-08-11 PROCEDURE — 65270000029 HC RM PRIVATE

## 2020-08-11 PROCEDURE — 36430 TRANSFUSION BLD/BLD COMPNT: CPT

## 2020-08-11 PROCEDURE — 97116 GAIT TRAINING THERAPY: CPT

## 2020-08-11 PROCEDURE — 36415 COLL VENOUS BLD VENIPUNCTURE: CPT

## 2020-08-11 PROCEDURE — 85025 COMPLETE CBC W/AUTO DIFF WBC: CPT

## 2020-08-11 PROCEDURE — 74011250636 HC RX REV CODE- 250/636: Performed by: OBSTETRICS & GYNECOLOGY

## 2020-08-11 PROCEDURE — 74011250637 HC RX REV CODE- 250/637: Performed by: OBSTETRICS & GYNECOLOGY

## 2020-08-11 RX ORDER — SODIUM CHLORIDE 9 MG/ML
250 INJECTION, SOLUTION INTRAVENOUS AS NEEDED
Status: DISCONTINUED | OUTPATIENT
Start: 2020-08-11 | End: 2020-08-13 | Stop reason: HOSPADM

## 2020-08-11 RX ADMIN — SODIUM CHLORIDE, SODIUM LACTATE, POTASSIUM CHLORIDE, AND CALCIUM CHLORIDE 125 ML/HR: 600; 310; 30; 20 INJECTION, SOLUTION INTRAVENOUS at 19:36

## 2020-08-11 RX ADMIN — OXYCODONE HYDROCHLORIDE AND ACETAMINOPHEN 1 TABLET: 5; 325 TABLET ORAL at 08:53

## 2020-08-11 RX ADMIN — Medication 10 ML: at 07:33

## 2020-08-11 RX ADMIN — OXYCODONE HYDROCHLORIDE AND ACETAMINOPHEN 2 TABLET: 5; 325 TABLET ORAL at 13:33

## 2020-08-11 RX ADMIN — Medication 10 ML: at 00:16

## 2020-08-11 RX ADMIN — CEFAZOLIN SODIUM 2 G: 2 SOLUTION INTRAVENOUS at 02:14

## 2020-08-11 RX ADMIN — OXYCODONE HYDROCHLORIDE AND ACETAMINOPHEN 2 TABLET: 5; 325 TABLET ORAL at 18:57

## 2020-08-11 RX ADMIN — ENOXAPARIN SODIUM 40 MG: 40 INJECTION SUBCUTANEOUS at 08:54

## 2020-08-11 RX ADMIN — HYDROMORPHONE HYDROCHLORIDE 0.5 MG: 2 INJECTION, SOLUTION INTRAMUSCULAR; INTRAVENOUS; SUBCUTANEOUS at 23:00

## 2020-08-11 RX ADMIN — KETOROLAC TROMETHAMINE 15 MG: 30 INJECTION, SOLUTION INTRAMUSCULAR at 08:54

## 2020-08-11 RX ADMIN — SODIUM CHLORIDE, SODIUM LACTATE, POTASSIUM CHLORIDE, AND CALCIUM CHLORIDE 125 ML/HR: 600; 310; 30; 20 INJECTION, SOLUTION INTRAVENOUS at 02:15

## 2020-08-11 RX ADMIN — Medication 10 ML: at 23:39

## 2020-08-11 NOTE — PROGRESS NOTES
0749-Patient ambulated to bathroom with one assist. Patient urinated 100 mL clear yelow urne and brushed teeth. Offered to assist patient up to chair upon return, patient declined at this time. Retirned patient to bed using one assist.  Patient denies SOB, chest pain or dizziness.

## 2020-08-11 NOTE — PROGRESS NOTES
Problem: Falls - Risk of  Goal: *Absence of Falls  Description: Document Hyun Mason Fall Risk and appropriate interventions in the flowsheet.   Outcome: Progressing Towards Goal  Note: Fall Risk Interventions:  Mobility Interventions: Assess mobility with egress test         Medication Interventions: Patient to call before getting OOB                   Problem: Pain  Goal: *Control of Pain  Outcome: Progressing Towards Goal     Problem: Infection - Risk of, Surgical Site Infection  Goal: *Absence of surgical site infection signs and symptoms  Outcome: Progressing Towards Goal

## 2020-08-11 NOTE — ROUTINE PROCESS
1205 - Bedside report received from Jazmine 18. Patient in bed at this time. Pain 0/10. Plan of care for the day addressed with the patient.

## 2020-08-11 NOTE — PROGRESS NOTES
7863 - Patient in bed at this time. A/O x 4. IV to right FA  intact and patent. SCDS to BLE. Optifom dressing to midline has moderate amount of breakthrough draiange but is not saturated. Patient denies numbness/tingling. Pedal pulses palpable. Lungs clear. Bowel sounds active to all quadrants. Patient able to get to 600 on the incentive spirometer. Pain 7/10.     0859 - Pain 7/10. PRN 1 tablet 5-325 percocet and 15 mg IV toradol pain medication administered at this time. Patient has been educated on side effects. 1337- Pain 8/10. PRN 2 tablets 5-325 mg percocet pain medication administered at this time. Patient has been educated on side effects. 1645-Blood transfusion started by Allen Deutsch RN.     4961 - Pain 8/10. PRN 2 tablets 5-325 percocet pain medication administered at this time. Patient has been educated on side effects. 1915-Blood transfusion complete.

## 2020-08-11 NOTE — PROGRESS NOTES
Bedside and verbal report given to DIOR Herring RN (oncoming nurse) by Darlene Keith RN  (off going nurse). Report included the following information SBAR, Kardex, OR Summary, Intake/Output, and MAR. Pt's pain and nausea were both controlled per MAR. No acute changes. Bedside and verbal report given by (off going nurse) DIOR Herring RN to (oncoming nurse) Heide Guardado RN. Report included the following information SBAR, Kardex, OR Summary, Intake/Output, and MAR.

## 2020-08-11 NOTE — ROUTINE PROCESS
1930- Bedside and Verbal shift change report given to Abhay Torre RN by [facility], RN. Report included the following information SBAR, Kardex, OR Summary, Intake/Output and MAR.

## 2020-08-11 NOTE — PROGRESS NOTES
Admit date: 8/10/2020        ASSESSMENT/PLAN  Jovana Seo is a 39 y. o.female POD#1 s/p Exploratory laparotomy, total abdominal hysterectomy, bilateral salpingectomies  Onc - final path pending  GI - tolerating diet, no n/v with PO meds  FEN - [email or web address], lytes stable, regular diet as tolerated  ID - afebrile, WBC 17.3, will monitor  Renal - creat nl, UOP good, has voided  Heme - hgb 7.0, symptomatic ABL anemia, received one unit intraoperatively, will transfuse one additional unit today  DVT Prophylaxis - Lovenox, SCDs  CV - VSS  Pulm - encourage IS  Disp - continue inpatient management, anticipate d/c home tomorrow      SUBJECTIVE  Pain controlled with PO Percocet and IV Toradol.        OBJECTIVE  Physical Exam:  Patient Vitals for the past 24 hrs:   BP Temp Pulse Resp SpO2   08/11/20 0825 113/63 98.2 °F (36.8 °C) 73 17 99 %   08/11/20 0442 94/64 98.3 °F (36.8 °C) 74 16 96 %   08/11/20 0034 134/69 98.3 °F (36.8 °C) 77 16 99 %   08/10/20 1955 107/62 97.9 °F (36.6 °C) 72 16 100 %   08/10/20 1752 120/47 97.9 °F (36.6 °C) 77 16 100 %   08/10/20 1647 101/59 98 °F (36.7 °C) 63 16 100 %   08/10/20 1542 108/66 97.1 °F (36.2 °C) 88 16 100 %   08/10/20 1442 104/58 97.6 °F (36.4 °C) 62 12 100 %   08/10/20 1435   63 12 100 %   08/10/20 1421   60 14 100 %   08/10/20 1420 97/60  62 12    08/10/20 1415 95/59  60 12 98 %   08/10/20 1410 98/56  (!) 59 13 100 %   08/10/20 1409   (!) 58 12 99 %   08/10/20 1405 98/57  (!) 57 13 99 %   08/10/20 1400 101/64  (!) 56 14    08/10/20 1355 96/64  (!) 57 12 99 %   08/10/20 1350 95/59  (!) 56 12    08/10/20 1347   (!) 54 11 100 %   08/10/20 1345 105/58  (!) 57 14    08/10/20 1340 109/51  79 15    08/10/20 1335   73 18 94 %   08/10/20 1330 107/63  (!) 58 12 94 %   08/10/20 1325 103/71  (!) 57 14 94 %   08/10/20 1320 106/63  (!) 59 15 93 %   08/10/20 1315 102/69  61 15 94 %   08/10/20 1310 97/60  61 10 90 %   08/10/20 1305 103/61  (!) 57 9 93 %   08/10/20 1300 109/57  (!) 53 9 100 %   08/10/20 1255 118/62  (!) 59 17 100 %   08/10/20 1250 112/74  (!) 55 13 100 %   08/10/20 1245 116/70  60 16 100 %   08/10/20 1240 111/65  (!) 57 13 100 %   08/10/20 1235 110/61  (!) 59 13 100 %   08/10/20 1231 118/66       08/10/20 1230 118/66 97.7 °F (36.5 °C) (!) 57 13 100 %         Intake/Output Summary (Last 24 hours) at 8/11/2020 1047  Last data filed at 8/11/2020 0751  Gross per 24 hour   Intake 3060 ml   Output 3350 ml   Net -290 ml        General - resting in no acute distress, alert and oriented  HEENT - within normal limits  Heart - regular rate and rhythm  Lungs - clear to auscultation  Abdomen - soft, nontender, nondistended, normal bowel sounds, hypoactive in right quadrant  Incision - clean, dry, intact, scant serous drainage from mid-incision  Extremities - no edema    Labs  CBC  Recent Labs     08/11/20  0245 08/10/20  1005   WBC 17.3* 6.0   HCT 23.2* 25.8*   MCV 65.4* 60.3*   MONOS 8  --    EOS 0  --    BASOS 0  --         CMP  Recent Labs     08/11/20  0245      CO2 25   BUN 5*        Lab Results   Component Value Date/Time    Glucose 104 (H) 08/11/2020 02:45 AM          Current Hospital Medications    Current Facility-Administered Medications:     0.9% sodium chloride infusion 250 mL, 250 mL, IntraVENous, PRN, Kaycee Griffin NP    lactated Ringers infusion, 125 mL/hr, IntraVENous, CONTINUOUS, Eduarda Devries MD, Stopped at 08/11/20 0743    sodium chloride (NS) flush 5-40 mL, 5-40 mL, IntraVENous, Q8H, Eduarda Devries MD, 10 mL at 08/11/20 0733    sodium chloride (NS) flush 5-40 mL, 5-40 mL, IntraVENous, PRN, Eduarda Devries MD    ketorolac (TORADOL) injection 15 mg, 15 mg, IntraVENous, Q6H PRN, Eduarda Devries MD, 15 mg at 08/11/20 0854    oxyCODONE-acetaminophen (PERCOCET) 5-325 mg per tablet 1 Tab, 1 Tab, Oral, Q4H PRN, Eduarda Devries MD, 1 Tab at 08/11/20 0853    oxyCODONE-acetaminophen (PERCOCET) 5-325 mg per tablet 2 Tab, 2 Tab, Oral, Q4H PRN, Evert Watkins MD, 2 Tab at 08/10/20 2033    naloxone [facility]) injection 0.4 mg, 0.4 mg, IntraVENous, PRN, Evert Watkins MD    ondansetron [facility]) injection 4 mg, 4 mg, IntraVENous, Q4H PRN, Evert Watkins MD    diphenhydrAMINE (BENADRYL) capsule 25 mg, 25 mg, Oral, Q4H PRN, Evert Watkins MD    benzocaine-menthoL (CEPACOL) lozenge 1 Lozenge, 1 Lozenge, Oral, PRN, Evert Watkins MD    enoxaparin (LOVENOX) injection 40 mg, 40 mg, SubCUTAneous, Q24H, Evert Watkins MD, 40 mg at 08/11/20 0897    HYDROmorphone (PF) (DILAUDID) injection 0.5 mg, 0.5 mg, IntraVENous, Q4H PRN, MD Betty Gilmore [facility]  Pager  [phone]

## 2020-08-12 LAB
ABO + RH BLD: NORMAL
ANION GAP SERPL CALC-SCNC: 4 MMOL/L (ref 3–18)
BASOPHILS # BLD: 0 K/UL (ref 0–0.1)
BASOPHILS NFR BLD: 0 % (ref 0–2)
BLD PROD TYP BPU: NORMAL
BLOOD GROUP ANTIBODIES SERPL: NORMAL
BPU ID: NORMAL
BUN SERPL-MCNC: 5 MG/DL (ref 7–18)
BUN/CREAT SERPL: 9 (ref 12–20)
CALCIUM SERPL-MCNC: 8.1 MG/DL (ref 8.5–10.1)
CHLORIDE SERPL-SCNC: 108 MMOL/L (ref 100–111)
CO2 SERPL-SCNC: 28 MMOL/L (ref 21–32)
CREAT SERPL-MCNC: 0.58 MG/DL (ref 0.6–1.3)
CROSSMATCH RESULT,%XM: NORMAL
DIFFERENTIAL METHOD BLD: ABNORMAL
EOSINOPHIL # BLD: 0.1 K/UL (ref 0–0.4)
EOSINOPHIL NFR BLD: 0 % (ref 0–5)
ERYTHROCYTE [DISTWIDTH] IN BLOOD BY AUTOMATED COUNT: 33.4 % (ref 11.6–14.5)
GLUCOSE SERPL-MCNC: 112 MG/DL (ref 74–99)
HCT VFR BLD AUTO: 29.3 % (ref 35–45)
HGB BLD-MCNC: 8.8 G/DL (ref 12–16)
LYMPHOCYTES # BLD: 1.3 K/UL (ref 0.9–3.6)
LYMPHOCYTES NFR BLD: 10 % (ref 21–52)
MCH RBC QN AUTO: 20.6 PG (ref 24–34)
MCHC RBC AUTO-ENTMCNC: 30 G/DL (ref 31–37)
MCV RBC AUTO: 68.6 FL (ref 74–97)
MONOCYTES # BLD: 0.9 K/UL (ref 0.05–1.2)
MONOCYTES NFR BLD: 7 % (ref 3–10)
NEUTS SEG # BLD: 11.1 K/UL (ref 1.8–8)
NEUTS SEG NFR BLD: 83 % (ref 40–73)
PLATELET # BLD AUTO: 358 K/UL (ref 135–420)
POTASSIUM SERPL-SCNC: 4 MMOL/L (ref 3.5–5.5)
RBC # BLD AUTO: 4.27 M/UL (ref 4.2–5.3)
SODIUM SERPL-SCNC: 140 MMOL/L (ref 136–145)
SPECIMEN EXP DATE BLD: NORMAL
STATUS OF UNIT,%ST: NORMAL
UNIT DIVISION, %UDIV: 0
WBC # BLD AUTO: 13.3 K/UL (ref 4.6–13.2)

## 2020-08-12 PROCEDURE — 74011250636 HC RX REV CODE- 250/636: Performed by: OBSTETRICS & GYNECOLOGY

## 2020-08-12 PROCEDURE — 36415 COLL VENOUS BLD VENIPUNCTURE: CPT

## 2020-08-12 PROCEDURE — 85025 COMPLETE CBC W/AUTO DIFF WBC: CPT

## 2020-08-12 PROCEDURE — 65270000029 HC RM PRIVATE

## 2020-08-12 PROCEDURE — 97116 GAIT TRAINING THERAPY: CPT

## 2020-08-12 PROCEDURE — 80048 BASIC METABOLIC PNL TOTAL CA: CPT

## 2020-08-12 RX ADMIN — ENOXAPARIN SODIUM 40 MG: 40 INJECTION SUBCUTANEOUS at 08:36

## 2020-08-12 RX ADMIN — ONDANSETRON 4 MG: 2 INJECTION INTRAMUSCULAR; INTRAVENOUS at 02:38

## 2020-08-12 RX ADMIN — SODIUM CHLORIDE, SODIUM LACTATE, POTASSIUM CHLORIDE, AND CALCIUM CHLORIDE 125 ML/HR: 600; 310; 30; 20 INJECTION, SOLUTION INTRAVENOUS at 02:28

## 2020-08-12 RX ADMIN — HYDROMORPHONE HYDROCHLORIDE 0.5 MG: 2 INJECTION, SOLUTION INTRAMUSCULAR; INTRAVENOUS; SUBCUTANEOUS at 02:38

## 2020-08-12 RX ADMIN — SODIUM CHLORIDE, SODIUM LACTATE, POTASSIUM CHLORIDE, AND CALCIUM CHLORIDE 125 ML/HR: 600; 310; 30; 20 INJECTION, SOLUTION INTRAVENOUS at 19:38

## 2020-08-12 RX ADMIN — Medication 10 ML: at 06:00

## 2020-08-12 RX ADMIN — HYDROMORPHONE HYDROCHLORIDE 0.5 MG: 2 INJECTION, SOLUTION INTRAMUSCULAR; INTRAVENOUS; SUBCUTANEOUS at 13:30

## 2020-08-12 RX ADMIN — ONDANSETRON 4 MG: 2 INJECTION INTRAMUSCULAR; INTRAVENOUS at 13:00

## 2020-08-12 RX ADMIN — HYDROMORPHONE HYDROCHLORIDE 0.5 MG: 2 INJECTION, SOLUTION INTRAMUSCULAR; INTRAVENOUS; SUBCUTANEOUS at 08:34

## 2020-08-12 RX ADMIN — SODIUM CHLORIDE, SODIUM LACTATE, POTASSIUM CHLORIDE, AND CALCIUM CHLORIDE 125 ML/HR: 600; 310; 30; 20 INJECTION, SOLUTION INTRAVENOUS at 10:59

## 2020-08-12 RX ADMIN — Medication 10 ML: at 13:31

## 2020-08-12 RX ADMIN — HYDROMORPHONE HYDROCHLORIDE 0.5 MG: 2 INJECTION, SOLUTION INTRAMUSCULAR; INTRAVENOUS; SUBCUTANEOUS at 19:20

## 2020-08-12 RX ADMIN — ONDANSETRON 4 MG: 2 INJECTION INTRAMUSCULAR; INTRAVENOUS at 06:41

## 2020-08-12 NOTE — PROGRESS NOTES
0725  Bedside and Verbal shift change report given by DIOR Shipman, RN (off going nurse) to Tammy Manjarrez RN (on coming nurse). Report included the following information SBAR, Kardex, OR Summary, Intake/Output and MAR    0834  Pt has abdominal pain (7/10). PRN (Dilaudid) pain medication administered at this time. Patient has been educated on the side effects. Side effect education sheets have been provided. 1015  According to the CNA, the patient threw up two times. Romel Camp NP is aware of this. 1300  Pt has nausea. Zofran was given. 1330  Pt has abdominal pain (8/10). PRN (Dilaudid) pain medication administered at this time. Patient has been educated on the side effects. Side effect education sheets have been provided. 1450  Pt is walking in the hallway. 1630  Pt is feeling better and wants to eat. Notified Dr. Silvia Sauceda. Dr. Silvia Sauceda ordered to put the patient in clear liquid diet. 1920  Pt has abdominal pain (6/10). Dilaudid was given. 1935  Bedside and Verbal shift change report given by Tammy Manjarrez RN (off going nurse) to LAURA Fierro (on coming nurse).  Report included the following information SBAR, Kardex, OR Summary, Intake/Output and MAR'  Patient Vitals for the past 12 hrs:   Temp Pulse Resp BP SpO2   08/12/20 1904 98.2 °F (36.8 °C) 88 17 124/78 97 %   08/12/20 1622 98.3 °F (36.8 °C) 80 17 121/79 98 %   08/12/20 1242 98.3 °F (36.8 °C) 87 17 150/86 97 %   08/12/20 0802 98.1 °F (36.7 °C) 83 19 (!) 135/99 94 %

## 2020-08-12 NOTE — ROUTINE PROCESS
Bedside shift change report given to Kisha Perales RN (oncoming nurse) by Dar Oliva RN (offgoing nurse). Report included the following information SBAR, Kardex, OR Summary, Intake/Output, MAR and Recent Results.

## 2020-08-12 NOTE — PROGRESS NOTES
Problem: Mobility Impaired (Adult and Pediatric)  Goal: *Acute Goals and Plan of Care (Insert Text)  Description: Physical Therapy Goals   Initiated 8/11/2020 and to be accomplished within 4 day(s)  1. Patient will move from supine <> sit with S/SBA in prep for out of bed activity and change of position. 2.  Patient will perform sit<> stand with S/SBA/LRAD in prep for transfers/ambulation. 3.  Patient will transfer from bed <> chair with S/SBA/LRAD for time up in chair for completion of ADL activity. 4.  Patient will ambulate 150 feet with S/LRAD for improved functional mobility at discharge. 5.  Patient will ascend/descend flight of stairs with handrail(s) with CGA  for home re-entry as needed. Outcome: Progressing Towards Goal   PHYSICAL THERAPY TREATMENT    Patient: Anuradha Mondragon (42 y.o. female)  Date: 8/12/2020  Diagnosis: Fibroid uterus [D25.9]   <principal problem not specified>  Procedure(s) (LRB):  TOTAL ABDOMINAL HYSTERECTOMY,BILATERAL SALPINGECTOMIES (Bilateral) 2 Days Post-Op  Precautions: Fall, Other (comment)(abdoinal incision)  PLOF: independent, ambulatory without AD    ASSESSMENT:  Increased time needed to carry out bed mobility. No assistance needed for sit to stand with bed in lowest position. Ambulated 100ft without AD, very slow pace, decreased step height and length of (B) LEs, no LOB or path deviations. VOided in bathroom prior to returning to bed. 175cc, independent with nan-care. Returned to supine in bed, Katherine with LEs up over edge of mattress. Progression toward goals:   [x]      Improving appropriately and progressing toward goals  []      Improving slowly and progressing toward goals  []      Not making progress toward goals and plan of care will be adjusted     PLAN:  Patient continues to benefit from skilled intervention to address the above impairments. Continue treatment per established plan of care.   Discharge Recommendations:  Home Health  Further Equipment Recommendations for Discharge:  N/A     SUBJECTIVE:   Patient stated My legs are feeling weak.     OBJECTIVE DATA SUMMARY:   Critical Behavior:  Neurologic State: Alert, Appropriate for age  Orientation Level: Oriented X4  Cognition: Appropriate decision making, Appropriate safety awareness, Appropriate for age attention/concentration, Follows commands  Safety/Judgement: Awareness of environment  Functional Mobility Training:  Bed Mobility:    Supine to Sit: Supervision; Additional time  Sit to Supine: Contact guard assistance; Moderate assistance; Additional time  Transfers:  Sit to Stand: Supervision  Stand to Sit: Supervision  Balance:  Sitting: Intact  Standing: Intact; With support   Ambulation/Gait Training:  Distance (ft): 100 Feet (ft)  Assistive Device: Gait belt  Ambulation - Level of Assistance: Stand-by assistance;Contact guard assistance  Gait Abnormalities: Antalgic;Decreased step clearance  Speed/Tory: Slow  Step Length: Right shortened;Left shortened    Pain:  Pain level pre-treatment: 5/10  Pain level post-treatment: 5/10   Pain Intervention(s): Medication (see MAR); Rest, Ice, Repositioning   Response to intervention: Nurse notified, See doc flow    Activity Tolerance:   Fair  Please refer to the flowsheet for vital signs taken during this treatment. After treatment:   [] Patient left in no apparent distress sitting up in chair  [x] Patient left in no apparent distress in bed  [x] Call bell left within reach  [] Nursing notified  [] Caregiver present  [] Bed alarm activated  [] SCDs applied      COMMUNICATION/EDUCATION:   [x]         Role of Physical Therapy in the acute care setting. [x]         Fall prevention education was provided and the patient/caregiver indicated understanding. [x]         Patient/family have participated as able in working toward goals and plan of care. [x]         Patient/family agree to work toward stated goals and plan of care.   []         Patient understands intent and goals of therapy, but is neutral about his/her participation.   []         Patient is unable to participate in stated goals/plan of care: ongoing with therapy staff.  []         Other:        Naina Treviño, PTA   Time Calculation: 24 mins

## 2020-08-12 NOTE — PROGRESS NOTES
Problem: Mobility Impaired (Adult and Pediatric)  Goal: *Acute Goals and Plan of Care (Insert Text)  Description: Physical Therapy Goals   Initiated 8/11/2020 and to be accomplished within 4 day(s)  1. Patient will move from supine <> sit with S/SBA in prep for out of bed activity and change of position. 2.  Patient will perform sit<> stand with S/SBA/LRAD in prep for transfers/ambulation. 3.  Patient will transfer from bed <> chair with S/SBA/LRAD for time up in chair for completion of ADL activity. 4.  Patient will ambulate 150 feet with S/LRAD for improved functional mobility at discharge. 5.  Patient will ascend/descend flight of stairs with handrail(s) with CGA  for home re-entry as needed. Outcome: Progressing Towards Goal  PHYSICAL THERAPY EVALUATION    Patient: Gila Yousif (49 y.o. female)  Date: 8/11/2020  Primary Diagnosis: Fibroid uterus [D25.9]  Procedure(s) (LRB):  TOTAL ABDOMINAL HYSTERECTOMY,BILATERAL SALPINGECTOMIES (Bilateral) 1 Day Post-Op   Precautions: Fall, Other (comment)(abdoinal incision)    ASSESSMENT :  Based on the objective data described below, the patient seen on medical unit and presents with generalized LE weakness, and decreased independence in overall functional mobility following above surgery. Pt reports amb independent ambulation prior. pt educated in log rolling and required min assist for completion of same and to sit EOB. Pt transfers to stand with CGA/RW and able to participate in GT/RW/CGA ~80ft. Tory slow, antalgic gt. Verbal cues for posture correction. Pt left up in chair on returned to room and nurses present; all needs in reach. Recommend HHPT and may need RW upon discharge to reach maximal level of independence/safety with functional mobility.      Pt Education: Role of physical therapy in acute care setting, fall prevention and safety/technique during functional mobility tasks      Patient will benefit from skilled intervention to address the above impairments. Patients rehabilitation potential is considered to be Good  Factors which may influence rehabilitation potential include:   [x]         None noted  []         Mental ability/status  []         Medical condition  []         Home/family situation and support systems  []         Safety awareness  []         Pain tolerance/management  []         Other:      PLAN :  Recommendations and Planned Interventions:  [x]           Bed Mobility Training             []    Neuromuscular Re-Education  [x]           Transfer Training                   []    Orthotic/Prosthetic Training  [x]           Gait Training                          []    Modalities  [x]           Therapeutic Exercises          []    Edema Management/Control  [x]           Therapeutic Activities            [x]    Patient and Family Training/Education  []           Other (comment):    Frequency/Duration: Patient will be followed by physical therapy 1-2 times per day to address goals. Discharge Recommendations: Home Health   Further Equipment Recommendations for Discharge: rolling walker     SUBJECTIVE:   Patient stated It's sore.     OBJECTIVE DATA SUMMARY:     Past Medical History:   Diagnosis Date    Anemia NEC as a child    iron deficiency     Breast lump 2017    Left Breast Lump    Fibroid      Past Surgical History:   Procedure Laterality Date    HX  SECTION  2008    HX GYN  2006    fibroids     Barriers to Learning/Limitations: yes;  physical  Compensate with: Verbal Cues  Prior Level of Function/Home Situation:   Home Situation  Home Environment: Apartment  # Steps to Enter: 12  Rails to Enter: Yes  Hand Rails : Bilateral  One/Two Story Residence: One story  Living Alone: No  Support Systems: Family member(s)  Patient Expects to be Discharged to[de-identified] Apartment  Current DME Used/Available at Home: None  Tub or Shower Type: Tub/Shower combination  Critical Behavior:  Neurologic State: Alert; Appropriate for age  Orientation Level: Oriented X4  Cognition: Follows commands  Safety/Judgement: Awareness of environment  Psychosocial  Patient Behaviors: Calm; Cooperative  Purposeful Interaction: Yes  Pt Identified Daily Priority: Clinical issues (comment)  Caritas Process: Nurture loving kindness;Establish trust;Teaching/learning; Attend basic human needs;Create healing environment  Caring Interventions: Reassure; Therapeutic modalities  Reassure: Therapeutic listening;Caring rounds  Therapeutic Modalities: Humor; Intentional therapeutic touch  Skin Condition/Temp: Warm;Dry  Skin Integrity: Incision (comment)  Skin Integumentary  Skin Color: Appropriate for ethnicity  Skin Condition/Temp: Warm;Dry  Skin Integrity: Incision (comment)  Turgor: Non-tenting  Strength:    Strength: Generally decreased, functional  Tone & Sensation:   Sensation: Intact  Range Of Motion:  AROM: Generally decreased, functional  PROM: Generally decreased, functional  Functional Mobility:  Bed Mobility:  Rolling: Minimum assistance;Contact guard assistance(vc)  Supine to Sit: Contact guard assistance  Scooting: Contact guard assistance  Transfers:  Sit to Stand: Contact guard assistance  Stand to Sit: Contact guard assistance  Bed to Chair: Contact guard assistance  Balance:   Sitting: Intact  Standing: Intact; With support  Ambulation/Gait Training:  Distance (ft): 80 Feet (ft)  Assistive Device: Walker, rolling  Ambulation - Level of Assistance: Contact guard assistance  Gait Description (WDL): Exceptions to WDL  Gait Abnormalities: Antalgic;Decreased step clearance  Speed/Tory: Slow  Step Length: Right shortened;Left shortened  Interventions: Safety awareness training  Pain:  Pain Scale 1: Numeric (0 - 10)  Pain Intensity 1: 3  Pain Location 1: Abdomen  Pain Orientation 1: Anterior  Pain Description 1: Aching  Pain Intervention(s) 1: Ice  Activity Tolerance:   Fair   Please refer to the flowsheet for vital signs taken during this treatment.   After treatment:   [x] Patient left in no apparent distress sitting up in chair  []         Patient left in no apparent distress in bed  [x]         Call bell left within reach  [x]         Nursing notified  []         Caregiver present  []         Bed alarm activated    COMMUNICATION/EDUCATION:   [x]         Fall prevention education was provided and the patient/caregiver indicated understanding. [x]         Patient/family have participated as able in goal setting and plan of care. [x]         Patient/family agree to work toward stated goals and plan of care. []         Patient understands intent and goals of therapy, but is neutral about his/her participation. []         Patient is unable to participate in goal setting and plan of care.     Eval Complexity: History: MEDIUM  Complexity : 1-2 comorbidities / personal factors will impact the outcome/ POC Exam:MEDIUM Complexity : 3 Standardized tests and measures addressing body structure, function, activity limitation and / or participation in recreation  Presentation: LOW Complexity : Stable, uncomplicated  Clinical Decision Making:Low Complexity    Overall Complexity:LOW     Thank you for this referral.  Pan Juarez, PT   Time Calculation: 23 mins

## 2020-08-12 NOTE — PROGRESS NOTES
Admit date: 8/10/2020        ASSESSMENT/PLAN  Jovana Langley is a 39 y. o.female POD#2 s/p Exploratory laparotomy, total abdominal hysterectomy, bilateral salpingectomies  Onc - final path pending  GI - Positive for nausea and vomiting x 2. Will discontinue regular diet and make pt NPO. LINDSEY [email or web address], lytes stable,   ID - afebrile, WBC 13.3, will monitor  Renal - creat 0.58, UOP good, has voided  Heme - hgb 8.8, symptomatic ABL anemia, S/P one unit intraoperatively and an additional unit 08/11/2020   DVT Prophylaxis - Lovenox, SCDs  CV - VSS  Pulm - encourage IS  Disp - continue inpatient management, rounding completed by [facility] virtually. SUBJECTIVE  Pain 7/10. Pt reports feeling nauseous and weak today. Denies passing gas and bowel movements.        OBJECTIVE  Physical Exam:  Patient Vitals for the past 24 hrs:   BP Temp Pulse Resp SpO2   08/12/20 0802 (!) 135/99 98.1 °F (36.7 °C) 83 19 94 %   08/12/20 0316 140/75 98.6 °F (37 °C) 79 17 99 %   08/11/20 2242 139/70 98.9 °F (37.2 °C) 81 17 96 %   08/11/20 1941 117/69 98.5 °F (36.9 °C) 83 17 100 %   08/11/20 1839 121/73 97 °F (36.1 °C) 89 17 100 %   08/11/20 1804 120/72 98.2 °F (36.8 °C) 79 18 99 %   08/11/20 1734 97/59 98.2 °F (36.8 °C) 78 17 99 %   08/11/20 1703 105/70 98 °F (36.7 °C) 80 16 100 %   08/11/20 1653 128/71 98.4 °F (36.9 °C) 73 16 100 %   08/11/20 1634 102/65 98.2 °F (36.8 °C) 81 16 100 %   08/11/20 1517 102/60 97.8 °F (36.6 °C) 68 17 97 %   08/11/20 1241 96/66 98.7 °F (37.1 °C) 92 18 99 %   08/11/20 1233 95/57 98.7 °F (37.1 °C) 92 18 99 %   08/11/20 1144 91/57 98.1 °F (36.7 °C) 99 18 98 %         Intake/Output Summary (Last 24 hours) at 8/12/2020 1136  Last data filed at 8/12/2020 1015  Gross per 24 hour   Intake 650 ml   Output 2350 ml   Net -1700 ml        General - resting in no acute distress, alert and oriented  HEENT - within normal limits  Heart - regular rate and rhythm  Lungs - clear to auscultation  Abdomen - distended  Incision - clean, dry, intact, scant serous drainage from mid-incision  Extremities - no edema    Labs  CBC  Recent Labs     08/12/20  0320 08/11/20  0245 08/10/20  1005   WBC 13.3* 17.3* 6.0   HCT 29.3* 23.2* 25.8*   MCV 68.6* 65.4* 60.3*   MONOS 7 8  --    EOS 0 0  --    BASOS 0 0  --         CMP  Recent Labs     08/12/20  0320 08/11/20  0245    139   CO2 28 25   BUN 5* 5*        Lab Results   Component Value Date/Time    Glucose 112 (H) 08/12/2020 03:20 AM          Current Hospital Medications    Current Facility-Administered Medications:     0.9% sodium chloride infusion 250 mL, 250 mL, IntraVENous, PRN, Ivett Rodriguez NP    lactated Ringers infusion, 125 mL/hr, IntraVENous, CONTINUOUS, Pat Song MD, Last Rate: 125 mL/hr at 08/12/20 1059, 125 mL/hr at 08/12/20 1059    sodium chloride (NS) flush 5-40 mL, 5-40 mL, IntraVENous, Q8H, Pat Song MD, 10 mL at 08/12/20 0600    sodium chloride (NS) flush 5-40 mL, 5-40 mL, IntraVENous, PRN, Pat Song MD    oxyCODONE-acetaminophen (PERCOCET) 5-325 mg per tablet 1 Tab, 1 Tab, Oral, Q4H PRN, Pat Song MD, 1 Tab at 08/11/20 0853    oxyCODONE-acetaminophen (PERCOCET) 5-325 mg per tablet 2 Tab, 2 Tab, Oral, Q4H PRN, Pat Song MD, 2 Tab at 08/11/20 1857    naloxone [facility]) injection 0.4 mg, 0.4 mg, IntraVENous, PRN, Pat Song MD    ondansetron [facility]) injection 4 mg, 4 mg, IntraVENous, Q4H PRN, Pat Song MD, 4 mg at 08/12/20 0641    diphenhydrAMINE (BENADRYL) capsule 25 mg, 25 mg, Oral, Q4H PRN, Pat Song MD    benzocaine-menthoL (CEPACOL) lozenge 1 Lozenge, 1 Lozenge, Oral, PRN, Pat Song MD    enoxaparin (LOVENOX) injection 40 mg, 40 mg, SubCUTAneous, Q24H, Pat Song MD, 40 mg at 08/12/20 0836    HYDROmorphone (PF) (DILAUDID) injection 0.5 mg, 0.5 mg, IntraVENous, Q4H PRN, Pat Song MD, 0.5 mg at 08/12/20 [facility] Emilie Yañez [facility]  Pager  [phone]

## 2020-08-12 NOTE — PROGRESS NOTES
Summary -- Pt comfortable throughout shift. No c/o n/v. Was able to consume approx 50% of clear liquid dinner without issue. States has passed flatus x1 and feels bowels moving. NABS. Voiding without difficulty. Able to ambulate to/from bathroom with one-person assist.  Pain controlled to goal with IV regimen. Has been anxious about trying PO d/t n/v but is optimistic as sx improving. No new concerns/complaints noted. Dsg to midline abd incision dry/intact with old spotting. Patient Vitals for the past 12 hrs:   Temp Pulse Resp BP SpO2   08/12/20 1904 98.2 °F (36.8 °C) 88 17 124/78 97 %   08/12/20 1622 98.3 °F (36.8 °C) 80 17 121/79 98 %   08/12/20 1242 98.3 °F (36.8 °C) 87 17 150/86 97 %     Shift change report given to Hailey Romo RN (oncoming nurse) by Kassie Hoffman RN (offgoing nurse). Report included the following information SBAR, Kardex, OR Summary, Intake/Output, MAR and Recent Results.

## 2020-08-12 NOTE — PROGRESS NOTES
Pt has been ambulating in the room independently. No transition of care needs have been identified. Anticipate pt will transition home with physician follow up when medically stable. CM remains available. Care Management Interventions  Mode of Transport at Discharge:  Other (see comment)(Family)  Transition of Care Consult (CM Consult): Discharge Planning  Health Maintenance Reviewed: Yes  Current Support Network: Family Lives Nearby  Confirm Follow Up Transport: Self  The Plan for Transition of Care is Related to the Following Treatment Goals : Home with physician follow up   Discharge Location  Discharge Placement: Home with family assistance

## 2020-08-13 VITALS
OXYGEN SATURATION: 98 % | SYSTOLIC BLOOD PRESSURE: 126 MMHG | WEIGHT: 149.44 LBS | TEMPERATURE: 98.7 F | BODY MASS INDEX: 26.48 KG/M2 | DIASTOLIC BLOOD PRESSURE: 78 MMHG | HEIGHT: 63 IN | RESPIRATION RATE: 17 BRPM | HEART RATE: 78 BPM

## 2020-08-13 LAB
ANION GAP SERPL CALC-SCNC: 4 MMOL/L (ref 3–18)
BASOPHILS # BLD: 0 K/UL (ref 0–0.1)
BASOPHILS NFR BLD: 0 % (ref 0–2)
BUN SERPL-MCNC: 3 MG/DL (ref 7–18)
BUN/CREAT SERPL: 5 (ref 12–20)
CALCIUM SERPL-MCNC: 8.6 MG/DL (ref 8.5–10.1)
CHLORIDE SERPL-SCNC: 106 MMOL/L (ref 100–111)
CO2 SERPL-SCNC: 30 MMOL/L (ref 21–32)
CREAT SERPL-MCNC: 0.58 MG/DL (ref 0.6–1.3)
DIFFERENTIAL METHOD BLD: ABNORMAL
EOSINOPHIL # BLD: 0.1 K/UL (ref 0–0.4)
EOSINOPHIL NFR BLD: 1 % (ref 0–5)
ERYTHROCYTE [DISTWIDTH] IN BLOOD BY AUTOMATED COUNT: 33.2 % (ref 11.6–14.5)
GLUCOSE SERPL-MCNC: 87 MG/DL (ref 74–99)
HCT VFR BLD AUTO: 27.3 % (ref 35–45)
HGB BLD-MCNC: 8.3 G/DL (ref 12–16)
LYMPHOCYTES # BLD: 1.9 K/UL (ref 0.9–3.6)
LYMPHOCYTES NFR BLD: 16 % (ref 21–52)
MCH RBC QN AUTO: 20.9 PG (ref 24–34)
MCHC RBC AUTO-ENTMCNC: 30.4 G/DL (ref 31–37)
MCV RBC AUTO: 68.6 FL (ref 74–97)
MONOCYTES # BLD: 1 K/UL (ref 0.05–1.2)
MONOCYTES NFR BLD: 8 % (ref 3–10)
NEUTS SEG # BLD: 8.9 K/UL (ref 1.8–8)
NEUTS SEG NFR BLD: 75 % (ref 40–73)
PLATELET # BLD AUTO: 294 K/UL (ref 135–420)
POTASSIUM SERPL-SCNC: 3.7 MMOL/L (ref 3.5–5.5)
RBC # BLD AUTO: 3.98 M/UL (ref 4.2–5.3)
RBC MORPH BLD: ABNORMAL
SODIUM SERPL-SCNC: 140 MMOL/L (ref 136–145)
WBC # BLD AUTO: 11.9 K/UL (ref 4.6–13.2)

## 2020-08-13 PROCEDURE — 36415 COLL VENOUS BLD VENIPUNCTURE: CPT

## 2020-08-13 PROCEDURE — 74011250636 HC RX REV CODE- 250/636: Performed by: OBSTETRICS & GYNECOLOGY

## 2020-08-13 PROCEDURE — 80048 BASIC METABOLIC PNL TOTAL CA: CPT

## 2020-08-13 PROCEDURE — 74011250637 HC RX REV CODE- 250/637: Performed by: OBSTETRICS & GYNECOLOGY

## 2020-08-13 PROCEDURE — 85025 COMPLETE CBC W/AUTO DIFF WBC: CPT

## 2020-08-13 PROCEDURE — 74011250637 HC RX REV CODE- 250/637: Performed by: NURSE PRACTITIONER

## 2020-08-13 RX ORDER — POLYETHYLENE GLYCOL 3350 17 G/17G
17 POWDER, FOR SOLUTION ORAL DAILY
Status: DISCONTINUED | OUTPATIENT
Start: 2020-08-13 | End: 2020-08-13 | Stop reason: HOSPADM

## 2020-08-13 RX ORDER — DOCUSATE SODIUM 100 MG/1
100 CAPSULE, LIQUID FILLED ORAL 2 TIMES DAILY
Status: DISCONTINUED | OUTPATIENT
Start: 2020-08-13 | End: 2020-08-13 | Stop reason: HOSPADM

## 2020-08-13 RX ORDER — OXYCODONE AND ACETAMINOPHEN 5; 325 MG/1; MG/1
1-2 TABLET ORAL
Qty: 60 TAB | Refills: 0 | Status: SHIPPED | OUTPATIENT
Start: 2020-08-13 | End: 2020-08-27

## 2020-08-13 RX ADMIN — DOCUSATE SODIUM 100 MG: 100 CAPSULE, LIQUID FILLED ORAL at 12:55

## 2020-08-13 RX ADMIN — Medication 20 ML: at 14:00

## 2020-08-13 RX ADMIN — POLYETHYLENE GLYCOL 3350 17 G: 17 POWDER, FOR SOLUTION ORAL at 12:55

## 2020-08-13 RX ADMIN — OXYCODONE HYDROCHLORIDE AND ACETAMINOPHEN 1 TABLET: 5; 325 TABLET ORAL at 12:55

## 2020-08-13 RX ADMIN — ENOXAPARIN SODIUM 40 MG: 40 INJECTION SUBCUTANEOUS at 09:12

## 2020-08-13 RX ADMIN — HYDROMORPHONE HYDROCHLORIDE 0.5 MG: 2 INJECTION, SOLUTION INTRAMUSCULAR; INTRAVENOUS; SUBCUTANEOUS at 01:55

## 2020-08-13 RX ADMIN — HYDROMORPHONE HYDROCHLORIDE 0.5 MG: 2 INJECTION, SOLUTION INTRAMUSCULAR; INTRAVENOUS; SUBCUTANEOUS at 10:38

## 2020-08-13 NOTE — PROGRESS NOTES
1317: Pt on the phone ordering lunch, will follow up    1348: 2nd attempt, pt eating lunch slowly, will follow up as schedule permits.

## 2020-08-13 NOTE — DISCHARGE SUMMARY
GYN Discharge Summary                        Patient ID:  Elena Houstno  39 y.o. Admission Date: 8/10/2020  Discharge Date:  08/13/20                                                   Attending: Rosita Paz MD   PCP: Felecia Curran MD                                                                                               Reason for admission:Fibroid uterus, menorrhagia, chronic blood loss anemia. Discharge  diagnosis: Active Problems:    Fibroid uterus (8/10/2020)        Associated Conditions:        Patient Active Problem List   Diagnosis Code    Low back pain M54.5    Anemia due to blood loss, chronic D50.0    Fibroid uterus D25.9              Past Medical History:   Diagnosis Date    Anemia NEC as a child    iron deficiency     Breast lump 01/25/2017    Left Breast Lump    Fibroid        Operative Procedure: Exploratory laparotomy, total abdominal hysterectomy, bilateral salpingectomies.     Complications:  none                   Transfusion:  2 units pRBCs total, 1 unit intraoperatively, 1 unit 8/11    Hospital Course: uncomplicated    Condition at discharge: good, stable, Afebrile, Ambulating and Eating, Drinking, Voiding    Labs:  Lab Results   Component Value Date/Time    WBC 11.9 08/13/2020 04:05 AM    HGB 8.3 (L) 08/13/2020 04:05 AM    HCT 27.3 (L) 08/13/2020 04:05 AM    PLATELET 210 31/10/4799 04:05 AM    MCV 68.6 (L) 08/13/2020 04:05 AM     Lab Results   Component Value Date/Time    Sodium 140 08/13/2020 04:05 AM    Potassium 3.7 08/13/2020 04:05 AM    Chloride 106 08/13/2020 04:05 AM    CO2 30 08/13/2020 04:05 AM    Anion gap 4 08/13/2020 04:05 AM    Glucose 87 08/13/2020 04:05 AM    BUN 3 (L) 08/13/2020 04:05 AM    Creatinine 0.58 (L) 08/13/2020 04:05 AM    BUN/Creatinine ratio 5 (L) 08/13/2020 04:05 AM    GFR est AA >60 08/13/2020 04:05 AM    GFR est non-AA >60 08/13/2020 04:05 AM         Current Discharge Medication List      START taking these medications    Details   oxyCODONE-acetaminophen (PERCOCET) 5-325 mg per tablet Take 1-2 Tabs by mouth every four (4) hours as needed for Pain for up to 14 days. Max Daily Amount: 12 Tabs. Qty: 60 Tab, Refills: 0    Associated Diagnoses: Postoperative pain         CONTINUE these medications which have NOT CHANGED    Details   acetaminophen (TYLENOL) 500 mg tablet Take 500 mg by mouth every six (6) hours as needed for Pain.          STOP taking these medications       tranexamic acid (LYSTEDA) 650 mg tab tablet Comments:   Reason for Stopping:         norgestimate-ethinyl estradioL (ORTHO-CYCLEN, [facility]) 0.25-35 mg-mcg tab Comments:   Reason for Stopping:                 Patient Instructions:        Disposition:  Home    Follow-up:  Follow up with Dr. Carlos Howard in 2 weeks    Author:   Cassandra Barbosa [facility]  Gynecologic Oncology  8/13/2020  3:58 PM

## 2020-08-13 NOTE — DISCHARGE INSTRUCTIONS
Patient Education        Laparoscopically Assisted Vaginal Hysterectomy: What to Expect at [address] can expect to feel better and stronger each day, although you may need pain medicine for a week or two. You may get tired easily or have less energy than usual. This may last for several weeks after surgery. You will probably notice that your belly is swollen and puffy. This is common. The swelling will take several weeks to go down. It may take about 4 to 6 weeks to fully recover. The recovery time may be less for some patients. You may have some light vaginal bleeding. Don't have sex until the doctor says it is okay. Don't douche or put anything into your vagina, such as a tampon, until your doctor says it is okay. It is important to avoid lifting while you are recovering so that you can heal.  This care sheet gives you a general idea about how long it will take for you to recover. But each person recovers at a different pace. Follow the steps below to get better as quickly as possible. How can you care for yourself at home? Activity  · Rest when you feel tired. Getting enough sleep will help you recover. · Try to walk each day. Start by walking a little more than you did the day before. Bit by bit, increase the amount you walk. Walking boosts blood flow and helps prevent pneumonia and constipation. · Avoid lifting anything that would make you strain. This may include heavy grocery bags and milk containers, a heavy briefcase or backpack, cat litter or dog food bags, a vacuum , or a child. · Avoid strenuous activities, such as biking, jogging, weight lifting, or aerobic exercise, until your doctor says it is okay. · You may shower. Pat the cut (incision) dry. Do not take a bath for the first 2 weeks, or until your doctor tells you it is okay. · Ask your doctor when you can drive again. · You will probably need to take about 2 weeks off from work.  It depends on the type of work you do and how you feel. · Your doctor will tell you when you can have sex again. Diet  · You can eat your normal diet. If your stomach is upset, try bland, low-fat foods like plain rice, broiled chicken, toast, and yogurt. · Drink plenty of fluids (unless your doctor tells you not to). · You may notice that your bowel movements are not regular right after your surgery. This is common. Try to avoid constipation and straining with bowel movements. You may want to take a fiber supplement every day. If you have not had a bowel movement after a couple of days, ask your doctor about taking a mild laxative. Medicines  · Your doctor will tell you if and when you can restart your medicines. He or she will also give you instructions about taking any new medicines. · If you take aspirin or some other blood thinner, ask your doctor if and when to start taking it again. Make sure that you understand exactly what your doctor wants you to do. · Be safe with medicines. Take pain medicines exactly as directed. ? If the doctor gave you a prescription medicine for pain, take it as prescribed. ? If you are not taking a prescription pain medicine, ask your doctor if you can take an over-the-counter medicine. · If you think your pain medicine is making you sick to your stomach:  ? Take your medicine after meals (unless your doctor has told you not to). ? Ask your doctor for a different pain medicine. · If your doctor prescribed antibiotics, take them as directed. Do not stop taking them just because you feel better. You need to take the full course of antibiotics. Incision care  · You may have stitches over the cuts (incisions) the doctor made in your belly. If you have strips of tape on the incisions the doctor made, leave the tape on for a week or until it falls off. Or follow your doctor's instructions for removing the tape. · Wash the area daily with warm, soapy water, and pat it dry.  Don't use hydrogen peroxide or alcohol, which can slow healing. You may cover the area with a gauze bandage if it weeps or rubs against clothing. Change the bandage every day. · Keep the area clean and dry. Other instructions  · You may have some light vaginal bleeding. Wear sanitary pads if needed. Do not douche or use tampons. Follow-up care is a key part of your treatment and safety. Be sure to make and go to all appointments, and call your doctor if you are having problems. It's also a good idea to know your test results and keep a list of the medicines you take. When should you call for help? [id number] anytime you think you may need emergency care. For example, call if:  · You passed out (lost consciousness). · You have chest pain, are short of breath, or cough up blood. Call your doctor now or seek immediate medical care if:  · You have pain that does not get better after you take pain medicine. · You cannot pass stools or gas. · You have vaginal discharge that has increased in amount or smells bad. · You are sick to your stomach or cannot drink fluids. · You have loose stitches, or your incision comes open. · Bright red blood has soaked through the bandage over your incision. · You have signs of infection, such as:  ? Increased pain, swelling, warmth, or redness. ? Red streaks leading from the incision. ? Pus draining from the incision. ? A fever. · You have bright red vaginal bleeding that soaks one or more pads in an hour, or you have large clots. · You have signs of a blood clot in your leg (called a deep vein thrombosis), such as:  ? Pain in your calf, back of the knee, thigh, or groin. ? Redness and swelling in your leg. Watch closely for changes in your health, and be sure to contact your doctor if you have any problems. Where can you learn more?   Go to http://luana-catalina.info/  Enter Z206 in the search box to learn more about \"Laparoscopically Assisted Vaginal Hysterectomy: What to Expect at Home.\"  Current as of: November 8, 2019               Content Version: 12.5  © 3697-7860 [employer]. Care instructions adapted under license by [employer] (which disclaims liability or warranty for this information). If you have questions about a medical condition or this instruction, always ask your healthcare professional. [address] any warranty or liability for your use of this information. Patient Education        Abdominal Hysterectomy: What to Expect at [address] [place] [address] Drive can expect to feel better and stronger each day, although you may need pain medicine for a week or two. You may get tired easily or have less energy than usual. This may last for several weeks after surgery. You will probably notice that your belly is swollen and puffy. This is common. The swelling will take several weeks to go down. It may take about 4 to 6 weeks to fully recover. It is important to avoid lifting while you are recovering so that you can heal.  This care sheet gives you a general idea about how long it will take for you to recover. But each person recovers at a different pace. Follow the steps below to get better as quickly as possible. How can you care for yourself at home? Activity  · Rest when you feel tired. Getting enough sleep will help you recover. · Try to walk each day. Start by walking a little more than you did the day before. Bit by bit, increase the amount you walk. Walking boosts blood flow and helps prevent pneumonia and constipation. · Avoid lifting anything that would make you strain. This may include a child, heavy grocery bags and milk containers, a heavy briefcase or backpack, cat litter or dog food bags, or a vacuum . · Avoid strenuous activities, such as biking, jogging, weight lifting, or aerobic exercise, until your doctor says it is okay. · You may shower. Pat the cut (incision) dry.  Do not take a bath for the first 2 weeks, or until your doctor tells you it is okay. · Ask your doctor when you can drive again. · You will probably need to take 2 to 4 weeks off from work. It depends on the type of work you do and how you feel. · Your doctor will tell you when you can have sex again. Diet  · You can eat your normal diet. If your stomach is upset, try bland, low-fat foods like plain rice, broiled chicken, toast, and yogurt. · Drink plenty of fluids (unless your doctor tells you not to). · You may notice that your bowel movements are not regular right after your surgery. This is common. Try to avoid constipation and straining with bowel movements. You may want to take a fiber supplement every day. If you have not had a bowel movement after a couple of days, ask your doctor about taking a mild laxative. Medicines  · Your doctor will tell you if and when you can restart your medicines. He or she will also give you instructions about taking any new medicines. · If you take aspirin or some other blood thinner, ask your doctor if and when to start taking it again. Make sure that you understand exactly what your doctor wants you to do. · Be safe with medicines. Take pain medicines exactly as directed. ? If the doctor gave you a prescription medicine for pain, take it as prescribed. ? If you are not taking a prescription pain medicine, ask your doctor if you can take an over-the-counter medicine. · If your doctor prescribed antibiotics, take them as directed. Do not stop taking them just because you feel better. You need to take the full course of antibiotics. · If you think your pain medicine is making you sick to your stomach:  ? Take your medicine after meals (unless your doctor has told you not to). ? Ask your doctor for a different pain medicine. Incision care  · If you have strips of tape on the cut (incision) the doctor made, leave the tape on for a week or until it falls off.  Or follow your doctor's instructions for removing the tape. · Wash the area daily with warm, soapy water, and pat it dry. Don't use hydrogen peroxide or alcohol, which can slow healing. You may cover the area with a gauze bandage if it weeps or rubs against clothing. Change the bandage every day. · Keep the area clean and dry. Other instructions  · You may have some light vaginal bleeding. Wear sanitary pads if needed. Do not douche or use tampons. Follow-up care is a key part of your treatment and safety. Be sure to make and go to all appointments, and call your doctor if you are having problems. It's also a good idea to know your test results and keep a list of the medicines you take. When should you call for help? [id number] anytime you think you may need emergency care. For example, call if:  · You passed out (lost consciousness). · You have chest pain, are short of breath, or cough up blood. Call your doctor now or seek immediate medical care if:  · You have pain that does not get better after you take pain medicine. · You cannot pass stools or gas. · You have vaginal discharge that has increased in amount or smells bad. · You are sick to your stomach or cannot drink fluids. · You have loose stitches, or your incision comes open. · Bright red blood has soaked through the bandage over your incision. · You have signs of infection, such as:  ? Increased pain, swelling, warmth, or redness. ? Red streaks leading from the incision. ? Pus draining from the incision. ? A fever. · You have bright red vaginal bleeding that soaks one or more pads in an hour, or you have large clots. · You have signs of a blood clot in your leg (called a deep vein thrombosis), such as:  ? Pain in your calf, back of the knee, thigh, or groin. ? Redness and swelling in your leg. Watch closely for changes in your health, and be sure to contact your doctor if you have any problems. Where can you learn more?   Go to http://luana-catalina.info/  Enter M280 in the search box to learn more about \"Abdominal Hysterectomy: What to Expect at Home. \"  Current as of: November 8, 2019               Content Version: 12.5  © 5748-6115 [employer]. Care instructions adapted under license by [employer] (which disclaims liability or warranty for this information). If you have questions about a medical condition or this instruction, always ask your healthcare professional. [address] any warranty or liability for your use of this information.

## 2020-08-13 NOTE — PROGRESS NOTES
8599 Shift Summary:  Patient required Dilaudid 0.5 mg IV for pain to abdomen rating at 8/10, which provided relief. Midline dressing to abdomen CDI, active bowel sounds. Denied n/v. Patient Vitals for the past 8 hrs:   Temp Pulse Resp BP SpO2   08/13/20 0314 98.6 °F (37 °C) 76 17 128/64 98 %   08/12/20 2242 98.9 °F (37.2 °C) 83 17 133/72 96 %         0700 - Bedside and Verbal shift change report given to DMITRIY Yu RN (oncoming nurse) by JULISSA Gillespie RN (offgoing nurse). Report included the following information SBAR, ED Summary, Intake/Output and MAR.

## 2020-08-13 NOTE — PROGRESS NOTES
Admit date: 8/10/2020        ASSESSMENT/PLAN  Jovana Rosen is a 39 y. o.female POD#3 s/p Exploratory laparotomy, total abdominal hysterectomy, bilateral salpingectomies  Onc - final path pending  GI - N/v improved. Tolerating clears. Advance slowly as tolerated. [email or web address], lytes stable,   ID - afebrile, WBC 11.9  Renal - creat 0.58, UOP good  Heme - hgb 8.3, symptomatic ABL anemia, S/P one unit intraoperatively and an additional unit 08/11/2020   DVT Prophylaxis - Lovenox, SCDs  CV - VSS  Pulm - encourage IS  Disp - continue inpatient management, possible d/c home today if tolerating diet and pain well controlled with Percocet. SUBJECTIVE  Pain 7/10. Tolerating clears. No n/v. No flatus, BM. Has not been OOB today.      OBJECTIVE  Physical Exam:  Patient Vitals for the past 24 hrs:   BP Temp Pulse Resp SpO2   08/13/20 0802 126/74 97.9 °F (36.6 °C) 83 18 96 %   08/13/20 0314 128/64 98.6 °F (37 °C) 76 17 98 %   08/12/20 2242 133/72 98.9 °F (37.2 °C) 83 17 96 %   08/12/20 1904 124/78 98.2 °F (36.8 °C) 88 17 97 %   08/12/20 1622 121/79 98.3 °F (36.8 °C) 80 17 98 %   08/12/20 1242 150/86 98.3 °F (36.8 °C) 87 17 97 %         Intake/Output Summary (Last 24 hours) at 8/13/2020 1053  Last data filed at 8/13/2020 9852  Gross per 24 hour   Intake 2903 ml   Output 1775 ml   Net 1128 ml        General - resting in no acute distress, alert and oriented  HEENT - within normal limits  Heart - regular rate and rhythm  Lungs - clear to auscultation  Abdomen - softly distended, appropriately tender  Incision - clean, dry, intact  Extremities - no edema    Labs  CBC  Recent Labs     08/13/20  0405 08/12/20  0320 08/11/20  0245   WBC 11.9 13.3* 17.3*   HCT 27.3* 29.3* 23.2*   MCV 68.6* 68.6* 65.4*   MONOS 8 7 8   EOS 1 0 0   BASOS 0 0 0        CMP  Recent Labs     08/13/20  0405 08/12/20  0320 08/11/20  0245    140 139   CO2 30 28 25   BUN 3* 5* 5*        Lab Results   Component Value Date/Time    Glucose 87 08/13/2020 04:05 AM          Current Hospital Medications    Current Facility-Administered Medications:     docusate sodium (COLACE) capsule 100 mg, 100 mg, Oral, BID, Yvette Rodriguez NP    polyethylene glycol (MIRALAX) packet 17 g, 17 g, Oral, DAILY, Yvette Rodriguez NP    0.9% sodium chloride infusion 250 mL, 250 mL, IntraVENous, PRN, Claude Josephs, NP    lactated Ringers infusion, 125 mL/hr, IntraVENous, CONTINUOUS, Lindsay Walker MD, Last Rate: 125 mL/hr at 08/12/20 1938, 125 mL/hr at 08/12/20 1938    sodium chloride (NS) flush 5-40 mL, 5-40 mL, IntraVENous, Q8H, Lindsay Walker MD, 10 mL at 08/12/20 1331    sodium chloride (NS) flush 5-40 mL, 5-40 mL, IntraVENous, PRN, Lindsay Walker MD    oxyCODONE-acetaminophen (PERCOCET) 5-325 mg per tablet 1 Tab, 1 Tab, Oral, Q4H PRN, Lindsay Walker MD, 1 Tab at 08/11/20 0853    oxyCODONE-acetaminophen (PERCOCET) 5-325 mg per tablet 2 Tab, 2 Tab, Oral, Q4H PRN, Lindsay Walker MD, 2 Tab at 08/11/20 1857    naloxone [facility]) injection 0.4 mg, 0.4 mg, IntraVENous, PRN, Lindsay Walker MD    ondansetron [facility]) injection 4 mg, 4 mg, IntraVENous, Q4H PRN, Lindsay Walker MD, 4 mg at 08/12/20 1300    diphenhydrAMINE (BENADRYL) capsule 25 mg, 25 mg, Oral, Q4H PRN, Lindsay Walker MD    benzocaine-menthoL (CEPACOL) lozenge 1 Lozenge, 1 Lozenge, Oral, PRN, Lindsay Walker MD    enoxaparin (LOVENOX) injection 40 mg, 40 mg, SubCUTAneous, Q24H, Lindsay Walker MD, 40 mg at 08/13/20 0912    HYDROmorphone (PF) (DILAUDID) injection 0.5 mg, 0.5 mg, IntraVENous, Q4H PRN, Lindsay Walker MD, 0.5 mg at 08/13/20 6 [facility]  Pager  [phone]

## 2020-08-17 ENCOUNTER — TELEPHONE (OUTPATIENT)
Dept: ONCOLOGY | Age: 45
End: 2020-08-17

## 2020-08-17 NOTE — TELEPHONE ENCOUNTER
Spoke with patient who stated that she has not had a bowel movement since she was discharged from the hospital. She is passing gas, but has not tried any stool softeners. Recommended patient increase water intake and use Dulcolax and a warm heating pad on her abdomin. Advised to use a suppository if Dulcolax does not work and to contact the office in the morning if she does not have a bowel movement.

## 2020-08-20 RX ORDER — HEPARIN 100 UNIT/ML
300-500 SYRINGE INTRAVENOUS AS NEEDED
Status: CANCELLED
Start: 2020-08-21

## 2020-08-20 RX ORDER — HYDROCORTISONE SODIUM SUCCINATE 100 MG/2ML
100 INJECTION, POWDER, FOR SOLUTION INTRAMUSCULAR; INTRAVENOUS AS NEEDED
Status: CANCELLED | OUTPATIENT
Start: 2020-08-21

## 2020-08-20 RX ORDER — SODIUM CHLORIDE 9 MG/ML
25 INJECTION, SOLUTION INTRAVENOUS CONTINUOUS
Status: CANCELLED | OUTPATIENT
Start: 2020-08-21

## 2020-08-20 RX ORDER — DIPHENHYDRAMINE HYDROCHLORIDE 50 MG/ML
25 INJECTION, SOLUTION INTRAMUSCULAR; INTRAVENOUS ONCE
Status: CANCELLED
Start: 2020-08-21

## 2020-08-20 RX ORDER — DIPHENHYDRAMINE HYDROCHLORIDE 50 MG/ML
25 INJECTION, SOLUTION INTRAMUSCULAR; INTRAVENOUS AS NEEDED
Status: CANCELLED
Start: 2020-08-21

## 2020-08-20 RX ORDER — ACETAMINOPHEN 325 MG/1
650 TABLET ORAL AS NEEDED
Status: CANCELLED
Start: 2020-08-21

## 2020-08-20 RX ORDER — EPINEPHRINE 1 MG/ML
0.3 INJECTION, SOLUTION, CONCENTRATE INTRAVENOUS AS NEEDED
Status: CANCELLED | OUTPATIENT
Start: 2020-08-21

## 2020-08-20 RX ORDER — DIPHENHYDRAMINE HYDROCHLORIDE 50 MG/ML
50 INJECTION, SOLUTION INTRAMUSCULAR; INTRAVENOUS AS NEEDED
Status: CANCELLED
Start: 2020-08-21

## 2020-08-20 RX ORDER — ONDANSETRON 2 MG/ML
8 INJECTION INTRAMUSCULAR; INTRAVENOUS AS NEEDED
Status: CANCELLED | OUTPATIENT
Start: 2020-08-21

## 2020-08-20 RX ORDER — SODIUM CHLORIDE 0.9 % (FLUSH) 0.9 %
10 SYRINGE (ML) INJECTION AS NEEDED
Status: CANCELLED | OUTPATIENT
Start: 2020-08-21

## 2020-08-20 RX ORDER — ALBUTEROL SULFATE 0.83 MG/ML
2.5 SOLUTION RESPIRATORY (INHALATION) AS NEEDED
Status: CANCELLED
Start: 2020-08-21

## 2020-08-20 RX ORDER — SODIUM CHLORIDE 9 MG/ML
10 INJECTION INTRAMUSCULAR; INTRAVENOUS; SUBCUTANEOUS AS NEEDED
Status: CANCELLED | OUTPATIENT
Start: 2020-08-21

## 2020-08-20 RX ORDER — ACETAMINOPHEN 325 MG/1
650 TABLET ORAL ONCE
Status: CANCELLED
Start: 2020-08-21

## 2020-08-20 NOTE — PROGRESS NOTES
Pharmacy Note     Name: Carmencita Orosco  : 1975  Estimated body surface area is 1.74 meters squared as calculated from the following:    Height as of 8/10/20: 160 cm (63\"). Weight as of 8/10/20: 67.8 kg (149 lb 7 oz). Lab Results   Component Value Date/Time    WBC 11.9 2020 04:05 AM    PLATELET 877  04:05 AM     Lab Results   Component Value Date/Time    ABS.  NEUTROPHILS 8.9 (H) 2020 04:05 AM     Lab Results   Component Value Date/Time    Creatinine 0.58 (L) 2020 04:05 AM     Most Recent Creatinine Clearance:  CrCl: 115.6 mL/min (based on Adjusted Body Weight)  Creatinine: 0.58 mg/dL (2020)      Pharmacy Intervention:  Spoke to Dr. Maty Seay on the phone prior to treating patient with first Infed dose   Confirmed Infed doses   [facility] to monitor labs (CBC/ iron profile) prior to patient receiving subsequent Infed doses  Pre-meds verified- Tylenol 650 mg PO x 1 and Diphenhydramine 25 mg IV x 1 prior to infusing Infed  Continue to monitor    Thank you,   Bevin Spatz, PharmD, M.S.  [address], Outpatient, and [address]   [phone]     Pharmacist: KRAIG Rios

## 2020-08-21 ENCOUNTER — HOSPITAL ENCOUNTER (OUTPATIENT)
Dept: INFUSION THERAPY | Age: 45
Discharge: HOME | End: 2020-08-21
Payer: COMMERCIAL

## 2020-08-21 VITALS
HEART RATE: 68 BPM | TEMPERATURE: 97.6 F | RESPIRATION RATE: 18 BRPM | DIASTOLIC BLOOD PRESSURE: 73 MMHG | SYSTOLIC BLOOD PRESSURE: 105 MMHG | OXYGEN SATURATION: 100 %

## 2020-08-21 DIAGNOSIS — D50.0 ANEMIA DUE TO BLOOD LOSS, CHRONIC: ICD-10-CM

## 2020-08-21 DIAGNOSIS — D50.0 ANEMIA DUE TO BLOOD LOSS, CHRONIC: Primary | ICD-10-CM

## 2020-08-21 PROCEDURE — 96375 TX/PRO/DX INJ NEW DRUG ADDON: CPT

## 2020-08-21 PROCEDURE — 74011000258 HC RX REV CODE- 258: Performed by: OBSTETRICS & GYNECOLOGY

## 2020-08-21 PROCEDURE — 96365 THER/PROPH/DIAG IV INF INIT: CPT

## 2020-08-21 PROCEDURE — 96366 THER/PROPH/DIAG IV INF ADDON: CPT

## 2020-08-21 PROCEDURE — 74011250637 HC RX REV CODE- 250/637: Performed by: OBSTETRICS & GYNECOLOGY

## 2020-08-21 PROCEDURE — 74011250636 HC RX REV CODE- 250/636: Performed by: OBSTETRICS & GYNECOLOGY

## 2020-08-21 RX ORDER — ACETAMINOPHEN 325 MG/1
650 TABLET ORAL ONCE
Status: CANCELLED
Start: 2020-08-21

## 2020-08-21 RX ORDER — DIPHENHYDRAMINE HYDROCHLORIDE 50 MG/ML
25 INJECTION, SOLUTION INTRAMUSCULAR; INTRAVENOUS ONCE
Status: CANCELLED
Start: 2020-08-21

## 2020-08-21 RX ORDER — ACETAMINOPHEN 325 MG/1
650 TABLET ORAL AS NEEDED
Status: CANCELLED
Start: 2020-08-21

## 2020-08-21 RX ORDER — ALBUTEROL SULFATE 0.83 MG/ML
2.5 SOLUTION RESPIRATORY (INHALATION) AS NEEDED
Status: CANCELLED
Start: 2020-08-21

## 2020-08-21 RX ORDER — SODIUM CHLORIDE 9 MG/ML
10 INJECTION INTRAMUSCULAR; INTRAVENOUS; SUBCUTANEOUS AS NEEDED
Status: CANCELLED | OUTPATIENT
Start: 2020-08-21

## 2020-08-21 RX ORDER — EPINEPHRINE 1 MG/ML
0.3 INJECTION, SOLUTION, CONCENTRATE INTRAVENOUS AS NEEDED
Status: CANCELLED | OUTPATIENT
Start: 2020-08-21

## 2020-08-21 RX ORDER — DIPHENHYDRAMINE HYDROCHLORIDE 50 MG/ML
25 INJECTION, SOLUTION INTRAMUSCULAR; INTRAVENOUS ONCE
Status: COMPLETED | OUTPATIENT
Start: 2020-08-21 | End: 2020-08-21

## 2020-08-21 RX ORDER — SODIUM CHLORIDE 9 MG/ML
25 INJECTION, SOLUTION INTRAVENOUS CONTINUOUS
Status: DISPENSED | OUTPATIENT
Start: 2020-08-21 | End: 2020-08-21

## 2020-08-21 RX ORDER — DIPHENHYDRAMINE HYDROCHLORIDE 50 MG/ML
50 INJECTION, SOLUTION INTRAMUSCULAR; INTRAVENOUS AS NEEDED
Status: CANCELLED
Start: 2020-08-21

## 2020-08-21 RX ORDER — DIPHENHYDRAMINE HYDROCHLORIDE 50 MG/ML
25 INJECTION, SOLUTION INTRAMUSCULAR; INTRAVENOUS AS NEEDED
Status: CANCELLED
Start: 2020-08-21

## 2020-08-21 RX ORDER — HEPARIN 100 UNIT/ML
300-500 SYRINGE INTRAVENOUS AS NEEDED
Status: CANCELLED
Start: 2020-08-21

## 2020-08-21 RX ORDER — SODIUM CHLORIDE 0.9 % (FLUSH) 0.9 %
10 SYRINGE (ML) INJECTION AS NEEDED
Status: CANCELLED | OUTPATIENT
Start: 2020-08-21

## 2020-08-21 RX ORDER — HYDROCORTISONE SODIUM SUCCINATE 100 MG/2ML
100 INJECTION, POWDER, FOR SOLUTION INTRAMUSCULAR; INTRAVENOUS AS NEEDED
Status: CANCELLED | OUTPATIENT
Start: 2020-08-21

## 2020-08-21 RX ORDER — ONDANSETRON 2 MG/ML
8 INJECTION INTRAMUSCULAR; INTRAVENOUS AS NEEDED
Status: CANCELLED | OUTPATIENT
Start: 2020-08-21

## 2020-08-21 RX ORDER — SODIUM CHLORIDE 0.9 % (FLUSH) 0.9 %
10 SYRINGE (ML) INJECTION AS NEEDED
Status: DISPENSED | OUTPATIENT
Start: 2020-08-21 | End: 2020-08-21

## 2020-08-21 RX ORDER — ACETAMINOPHEN 325 MG/1
650 TABLET ORAL ONCE
Status: COMPLETED | OUTPATIENT
Start: 2020-08-21 | End: 2020-08-21

## 2020-08-21 RX ORDER — SODIUM CHLORIDE 9 MG/ML
25 INJECTION, SOLUTION INTRAVENOUS CONTINUOUS
Status: CANCELLED | OUTPATIENT
Start: 2020-08-21

## 2020-08-21 RX ADMIN — SODIUM CHLORIDE 25 MG: 900 INJECTION, SOLUTION INTRAVENOUS at 09:17

## 2020-08-21 RX ADMIN — SODIUM CHLORIDE 25 ML/HR: 900 INJECTION, SOLUTION INTRAVENOUS at 09:00

## 2020-08-21 RX ADMIN — DIPHENHYDRAMINE HYDROCHLORIDE 25 MG: 50 INJECTION INTRAMUSCULAR; INTRAVENOUS at 08:33

## 2020-08-21 RX ADMIN — SODIUM CHLORIDE 975 MG: 900 INJECTION, SOLUTION INTRAVENOUS at 11:42

## 2020-08-21 RX ADMIN — ACETAMINOPHEN 650 MG: 325 TABLET, FILM COATED ORAL at 08:30

## 2020-08-21 RX ADMIN — Medication 10 ML: at 17:10

## 2020-08-21 NOTE — PROGRESS NOTES
[facility] Progress Note    Date: 2020    Name: Breana Pope    MRN: [id number]         : 1975    Infed    Ms. Mcgee was assessed and education was provided. Ms. Jose Mccann vitals were reviewed and patient was observed for 5 minutes prior to treatment. Visit Vitals  /73 (BP 1 Location: Left arm, BP Patient Position: Sitting)   Pulse 68   Temp 97.6 °F (36.4 °C)   Resp 18   SpO2 100%       Lab results were obtained and reviewed. No results found for this or any previous visit (from the past 12 hour(s)). Saline lock started to right forearm x1 attempt using 22g catheter. Line flushes briskly and has good blood return. Pre-medications were administered as ordered Tylenol 650 mg PO, Benadryl 25 mg IVP and infed was initiated after 30 minutes. Infed 25 mg was infused over 30 mins. Ms. Jennifer Milner was observed for 1 hour. No s/s of reaction occurred. Pharmacy notified that she did not have a reaction. Infed 975 mg was initiated at 83 ml/hr for 1 hour, then increased to 125 ml/hr for remainder of dose. After infusion complete line flushed with 10 ml normal saline then removed. Gauze and coban applied to site. Ms. Jennifer Milner tolerated the infusion, and had no complaints. Patient armband removed and shredded. Ms. Jennifer Milner was discharged from [place] [address] in stable condition at [phone]. She is to return on 20 at 0800 for her next appointment.     Shekhar Bradshaw RN  2020  5:37 PM

## 2020-08-25 ENCOUNTER — OFFICE VISIT (OUTPATIENT)
Dept: ONCOLOGY | Age: 45
End: 2020-08-25

## 2020-08-25 VITALS
WEIGHT: 145.6 LBS | DIASTOLIC BLOOD PRESSURE: 79 MMHG | HEART RATE: 76 BPM | SYSTOLIC BLOOD PRESSURE: 112 MMHG | BODY MASS INDEX: 25.8 KG/M2 | RESPIRATION RATE: 12 BRPM | OXYGEN SATURATION: 97 % | TEMPERATURE: 98.1 F | HEIGHT: 63 IN

## 2020-08-25 DIAGNOSIS — Z48.02 ENCOUNTER FOR STAPLE REMOVAL: ICD-10-CM

## 2020-08-25 DIAGNOSIS — D25.9 UTERINE LEIOMYOMA, UNSPECIFIED LOCATION: Primary | ICD-10-CM

## 2020-08-25 NOTE — PROGRESS NOTES
[facility] Cardiology  [address] [facility] [place] [address]  Phone: [phone]  Fax: [phone]                  Crystal Romero   3/14/2017 8:00 AM   Office Visit    Description:  Female : 1932   Provider:  Orlando Hassan MD   Department:  [facility] - Cardiology           Reason for Visit     Loss of Consciousness                To Do List           Future Appointments        Provider Department Dept Phone    3/24/2017 9:00 AM MD DOM BenitezMalik - Neurology [phone]    2017 8:00 AM PULMONARY LAB, [facility]- Pulmonary Function [facility] [phone]    2017 8:20 AM Chuck Slater MD [facility] Pulmonary Services [phone]    2017 4:00 PM LABORATORY, [facility] - [facility] [phone]    2017 4:30 PM Keegan Yarbrough MD [facility] - Rheumatology [phone]      Goals (5 Years of Data)     None      Follow-Up and Disposition     Return in about 4 months (around 2017).      [facility]     [facility] -  Assistance  Registered nurses in the [facility] provide clinical advisement, health education, appointment booking, and other advisory services.  Call for this free service at [phone].             Medications           Message regarding Medications     Verify the changes and/or additions to your medication regime listed below are the same as discussed with your clinician today.  If any of these changes or additions are incorrect, please notify your healthcare provider.             Verify that the below list of medications is an accurate representation of the medications you are currently taking.  If none reported, the list may be blank. If incorrect, please contact your healthcare provider. Carry this list with you in case of emergency.           Current Medications     baicalin-catechin-citrated znc 500-50 mg Cap Take by mouth 2 (two) times daily.    folic acid (FOLVITE) 1 MG tablet Take 1 mg by  Hossein Mcdowell is a 39 y.o. female presents in office for 2 week surgical post op/removal of staples. Chief Complaint   Patient presents with    Surgical Follow-up     Patient stated having a hard time resting. Patient states has had some pain this AM 8/25. Patient states has had some abdominal cramping. Patient states no bleeding or discharge. Patient states has some numbness and pain in outer left thigh. Patient states has sharp pains in outer left thigh while walking. Patient states has some pain in lower pelvic region. 21 staples were removed by Jamey Handy without difficulty. Wound edges were well approximated. Steri-strips were used. There was not any evidence of infection. Patient did not tolerate well. Do you have any unusual vaginal bleeding, discharge or irritation? No     Do you have any changes in your urination or bowel movements? No     Have you been experiencing any continuous or worsening abdominal pain? Yes    .   Visit Vitals  /79 (BP 1 Location: Right arm, BP Patient Position: Sitting)   Pulse 76   Temp 98.1 °F (36.7 °C) (Oral)   Resp 12   Ht 5' 3\" (1.6 m)   Wt 145 lb 9.6 oz (66 kg)   SpO2 97%   BMI 25.79 kg/m²       Health Maintenance Due   Topic Date Due    Lipid Screen  03/29/2018       3 most recent PHQ Screens 7/28/2020   Little interest or pleasure in doing things Not at all   Feeling down, depressed, irritable, or hopeless Not at all   Total Score PHQ 2 0       Learning Assessment 6/12/2017   PRIMARY LEARNER Patient   HIGHEST LEVEL OF EDUCATION - PRIMARY LEARNER  SOME COLLEGE   BARRIERS PRIMARY LEARNER NONE   CO-LEARNER CAREGIVER No   PRIMARY LANGUAGE ENGLISH   LEARNER PREFERENCE PRIMARY DEMONSTRATION     -   ANSWERED BY patient   RELATIONSHIP SELF "mouth once daily.    hydroxychloroquine (PLAQUENIL) 200 mg tablet Take 200 mg by mouth once daily.    levoFLOXacin (LEVAQUIN) 750 MG tablet Take 1 tablet (750 mg total) by mouth once daily.    levothyroxine (SYNTHROID) 25 MCG tablet Take 25 mcg by mouth once daily.    lisinopril 10 MG tablet Take 10 mg by mouth once daily.    methotrexate 2.5 MG Tab Take 2.5 mg by mouth every 7 days.    metoprolol succinate (TOPROL-XL) 25 MG 24 hr tablet Take 25 mg by mouth once daily.    pantoprazole (PROTONIX) 40 MG tablet Take 40 mg by mouth once daily.    rosuvastatin (CRESTOR) 10 MG tablet Take 10 mg by mouth once daily.    sertraline (ZOLOFT) 50 MG tablet Take 50 mg by mouth once daily.    tramadol (ULTRAM) 50 mg tablet Take 50 mg by mouth 2 (two) times daily as needed for Pain.     trazodone (DESYREL) 50 MG tablet Take 50 mg by mouth every evening.    acetaminophen (TYLENOL) 500 MG tablet Take 500 mg by mouth every 6 (six) hours as needed for Pain.    albuterol (PROVENTIL) 5 mg/mL nebulizer solution Take 2.5 mg by nebulization every 6 (six) hours as needed for Wheezing. Rescue    budesonide (PULMICORT) 0.5 mg/2 mL nebulizer solution Take 0.5 mg by nebulization once daily. Controller    cetirizine (ZYRTEC) 10 MG tablet Take 10 mg by mouth once daily.    diclofenac sodium 1.5 % Drop Apply 2 % topically as needed.    fluticasone (FLONASE) 50 mcg/actuation nasal spray 1 spray by Each Nare route once daily.    INHALER, ASSIST DEVICES (AEROCHAMBER PLUS FLOW-VU MISC) by Misc.(Non-Drug; Combo Route) route.    verapamil (VERELAN) 240 MG C24P Take 240 mg by mouth once daily.           Clinical Reference Information           Your Vitals Were     BP Pulse Temp Height Weight BMI    136/70 80 98.2 °F (36.8 °C) (Oral) 5' 2" (1.575 m) 52.5 kg (115 lb 11.9 oz) 21.17 kg/m2      Blood Pressure          Most Recent Value    Right Arm BP - Sitting  136/70    Left Arm BP - Sitting  136/72    BP  136/70      Allergies as of 3/14/2017     Sulfa " (Sulfonamide Antibiotics)      Immunizations Administered on Date of Encounter - 3/14/2017     None      [facility] Sign-Up     Activating your [facility] account is as easy as 1-2-3!     1) Visit [email or web address], select Sign Up Now, enter this activation code and your date of birth, then select Next.  [email or web address]  Expires: 3/24/2017  4:15 PM      2) Create a username and password to use when you visit [facility] in the future and select a security question in case you lose your password and select Next.    3) Enter your e-mail address and click Sign Up!    Additional Information  If you have questions, please e-mail [email or web address] or call [phone] to talk to our [facility] staff. Remember, [facility] is NOT to be used for urgent needs. For medical emergencies, dial 911.         Language Assistance Services     ATTENTION: Language assistance services are available, free of charge. Please call [phone].      ATENCIÓN: Si habla español, tiene a duncan disposición servicios gratuitos de asistencia lingüística. Llame al [phone].     CHÚ Ý: N?u b?n nói Ti?ng Vi?t, có các d?ch v? h? tr? ngôn ng? mi?n phí dành cho b?n. G?i s? [phone].         [facility] - [facility] complies with applicable Federal civil rights laws and does not discriminate on the basis of race, color, national origin, age, disability, or sex.

## 2020-08-25 NOTE — LETTER
8/25/20 Patient: Brittany Garcia YOB: 1975 Date of Visit: 8/25/2020 Mariel Obando MD 
[address] [facility] [address] VIA Facsimile: [phone] Dear Mariel Obando MD, Thank you for referring Ms. Jovana Mcgee to [facility] for evaluation. My notes for this consultation are attached. If you have questions, please do not hesitate to call me. I look forward to following your patient along with you. Sincerely, Lori Borja, NP

## 2020-08-25 NOTE — PATIENT INSTRUCTIONS
Abdominal Hysterectomy: What to Expect at [facility] Your Recovery You can expect to feel better and stronger each day, although you may need pain medicine for a week or two. You may get tired easily or have less energy than usual. This may last for several weeks after surgery. You will probably notice that your belly is swollen and puffy. This is common. The swelling will take several weeks to go down. It may take about 4 to 6 weeks to fully recover. It is important to avoid lifting while you are recovering so that you can heal. 
This care sheet gives you a general idea about how long it will take for you to recover. But each person recovers at a different pace. Follow the steps below to get better as quickly as possible. How can you care for yourself at home? Activity · Rest when you feel tired. Getting enough sleep will help you recover. · Try to walk each day. Start by walking a little more than you did the day before. Bit by bit, increase the amount you walk. Walking boosts blood flow and helps prevent pneumonia and constipation. · Avoid lifting anything that would make you strain. This may include a child, heavy grocery bags and milk containers, a heavy briefcase or backpack, cat litter or dog food bags, or a vacuum . · Avoid strenuous activities, such as biking, jogging, weight lifting, or aerobic exercise, until your doctor says it is okay. · You may shower. Pat the cut (incision) dry. Do not take a bath for the first 2 weeks, or until your doctor tells you it is okay. · Ask your doctor when you can drive again. · You will probably need to take 2 to 4 weeks off from work. It depends on the type of work you do and how you feel. · Your doctor will tell you when you can have sex again. Diet · You can eat your normal diet. If your stomach is upset, try bland, low-fat foods like plain rice, broiled chicken, toast, and yogurt. · Drink plenty of fluids (unless your doctor tells you not to). · You may notice that your bowel movements are not regular right after your surgery. This is common. Try to avoid constipation and straining with bowel movements. You may want to take a fiber supplement every day. If you have not had a bowel movement after a couple of days, ask your doctor about taking a mild laxative. Medicines · Your doctor will tell you if and when you can restart your medicines. He or she will also give you instructions about taking any new medicines. · If you take aspirin or some other blood thinner, ask your doctor if and when to start taking it again. Make sure that you understand exactly what your doctor wants you to do. · Be safe with medicines. Take pain medicines exactly as directed. ? If the doctor gave you a prescription medicine for pain, take it as prescribed. ? If you are not taking a prescription pain medicine, ask your doctor if you can take an over-the-counter medicine. · If your doctor prescribed antibiotics, take them as directed. Do not stop taking them just because you feel better. You need to take the full course of antibiotics. · If you think your pain medicine is making you sick to your stomach: 
? Take your medicine after meals (unless your doctor has told you not to). ? Ask your doctor for a different pain medicine. Incision care · If you have strips of tape on the cut (incision) the doctor made, leave the tape on for a week or until it falls off. Or follow your doctor's instructions for removing the tape. · Wash the area daily with warm, soapy water, and pat it dry. Don't use hydrogen peroxide or alcohol, which can slow healing. You may cover the area with a gauze bandage if it weeps or rubs against clothing. Change the bandage every day. · Keep the area clean and dry. Other instructions · You may have some light vaginal bleeding. Wear sanitary pads if needed. Do not douche or use tampons. Follow-up care is a key part of your treatment and safety. Be sure to make and go to all appointments, and call your doctor if you are having problems. It's also a good idea to know your test results and keep a list of the medicines you take. When should you call for help? [id number] anytime you think you may need emergency care. For example, call if: 
· You passed out (lost consciousness). · You have chest pain, are short of breath, or cough up blood. Call your doctor now or seek immediate medical care if: 
· You have pain that does not get better after you take pain medicine. · You cannot pass stools or gas. · You have vaginal discharge that has increased in amount or smells bad. · You are sick to your stomach or cannot drink fluids. · You have loose stitches, or your incision comes open. · Bright red blood has soaked through the bandage over your incision. · You have signs of infection, such as: 
? Increased pain, swelling, warmth, or redness. ? Red streaks leading from the incision. ? Pus draining from the incision. ? A fever. · You have bright red vaginal bleeding that soaks one or more pads in an hour, or you have large clots. · You have signs of a blood clot in your leg (called a deep vein thrombosis), such as: 
? Pain in your calf, back of the knee, thigh, or groin. ? Redness and swelling in your leg. Watch closely for changes in your health, and be sure to contact your doctor if you have any problems. Where can you learn more? Go to http://luana-catalina.info/ Enter M280 in the search box to learn more about \"Abdominal Hysterectomy: What to Expect at Home. \" Current as of: November 8, 2019               Content Version: 12.5 © 4564-4881 [employer]. Care instructions adapted under license by [employer] (which disclaims liability or warranty for this information).  If you have questions about a medical condition or this instruction, always ask your healthcare professional. [place] [address] any warranty or liability for your use of this information.

## 2020-08-25 NOTE — PROGRESS NOTES
[address], [address], [address] [place] [address] [address]  [place] [phone]  [facility]        Postoperative Office Note  Patient ID:  Name: Vesta Last  MRN: [id number]  : 1975 39 y.o. Date: 2020    SUBJECTIVE:    This is a 74 y.o.  female who presents s/p Exploratory laparotomy, total abdominal hysterectomy, bilateral salpingectomies on 8/10/20. Currently she has no problems with eating, bowel movements, voiding, or her wound. Appetite is good. Eating a regular diet without difficulty. Urinating without difficulty. Bowel movements are regular with prune juice and hydration. Pain is controlled with current analgesics. Medication(s) being used: Percocet. She reports intermittent left leg numbness and pain. Her pathology revealed     UTERUS, TOTAL HYSTERECTOMY WITH BILATERAL SALPINGECTOMY:   PROLIFERATIVE ENDOMETRIUM. MULTIPLE LEIOMYOMAS OF CORPUS. BENIGN ENDOCERVICAL POLYP (1.0 CM). BENIGN LEFT AND RIGHT FALLOPIAN TUBES. Medications:     Current Outpatient Prescriptions on File Prior to Visit   Medication Sig Dispense Refill    acetaminophen (TYLENOL) 500 mg tablet Take 500 mg by mouth every six (6) hours as needed for Pain.  Omega-3-DHA-EPA-Fish Oil (FISH OIL) 1,000 mg (120 mg-180 mg) cap Take 2 Caps by mouth.  traMADol (ULTRAM) 50 mg tablet Take 1 Tab by mouth every six (6) hours as needed for Pain. Max Daily Amount: 200 mg. 60 Tab 0    azelastine (ASTELIN) 137 mcg (0.1 %) nasal spray 1 [place] by Nasal route as needed.  REPATHA SURECLICK pen injection every fourteen (14) days.  fluticasone (FLONASE) 50 mcg/actuation nasal spray 1 Spray by Nasal route as needed.  levothyroxine (SYNTHROID) 88 mcg tablet 88 mcg daily.  aspirin delayed-release 81 mg tablet 81 mg daily.       cholecalciferol (VITAMIN D3) 1,000 unit tablet 1,000 Units.  furosemide (LASIX) 40 mg tablet 40 mg daily.  magnesium oxide (MAG-OX) 400 mg tablet 400 mg daily.  metoprolol tartrate (LOPRESSOR) 25 mg tablet 25 mg two (2) times a day.  potassium chloride (K-DUR, KLOR-CON) 10 mEq tablet 20 mEq two (2) times a day.  metoclopramide HCl (REGLAN) 10 mg tablet 10 mg two (2) times a day.  valsartan (DIOVAN) 40 mg tablet 40 mg nightly.  cyclobenzaprine (FLEXERIL) 10 mg tablet 10 mg three (3) times daily as needed.  chlorzoxazone (PARAFON FORTE) 500 mg tablet as needed.  bumetanide (BUMEX) 1 mg tablet 1 mg daily. Indications: Edema      lansoprazole (PREVACID) 30 mg capsule Take  by mouth two (2) times a day. No current facility-administered medications on file prior to visit. Allergies: Allergies   Allergen Reactions    Adhesive Tape-Silicones Other (comments)    Adhesive Tape-Silicones Rash and Itching    Allopurinol Other (comments)     Lightheaded.  Codeine Other (comments)    Codeine Nausea and Vomiting     Generic form only per patient    Ezetimibe-Simvastatin Myalgia    Ibuprofen Hives    Ibuprofen Unknown (comments)     Ask patient    Iodinated Contrast- Oral And Iv Dye Other (comments)     Has done well if premedicated    Rosuvastatin Myalgia    Statins-Hmg-Coa Reductase Inhibitors Other (comments)    Statins-Hmg-Coa Reductase Inhibitors Myalgia       OBJECTIVE:    Vitals: There were no vitals taken for this visit. Physical Examination:    General:  alert, cooperative, no distress   Abdomen: soft, bowel sounds active, non-tender   Incision: healing well, staples removed today by Cecilio Hoff LPN, see note   Pelvic: deferred   Rectal: not done   Extremity:   extremities normal, atraumatic, no cyanosis or edema     IMPRESSION/PLAN:    Иван Pendleton is doing well postoperatively. She has a working diagnosis of benign uterine fibroids.   The operative procedures and clinical results have been reviewed with the patient. Postop pain. Reviewed Percocet dosing, will provide limited refill PRN. Also discussed transitioning to OTC medications for pain relief, written handout provided. Left leg numbness, favor positioning during surgery. Expectant management, reassurance provided, should expect for this to improve as she gets further out from surgery. I will see the patient back in 2 weeks for vaginal cuff check. The patient is advised to call our office with any problems or concerns.     Diana Lopez [facility]  Gynecologic Oncology  8/25/2020 9:52 AM

## 2020-08-27 NOTE — PROGRESS NOTES
Pharmacy Note     Name: John Connors  : 1975  Estimated body surface area is 1.71 meters squared as calculated from the following:    Height as of 20: 160 cm (63\"). Weight as of 20: 66 kg (145 lb 9.6 oz). Diagnosis: Chronic Blood Loss Anemia   Treatment Plan: Infed 1000 mg IVPB x 5 doses   Next Treatment Date: 2020    Lab Results   Component Value Date/Time    WBC 11.9 2020 04:05 AM    PLATELET 348 9444 04:05 AM     Lab Results   Component Value Date/Time    ABS.  NEUTROPHILS 8.9 (H) 2020 04:05 AM     Lab Results   Component Value Date/Time    Creatinine 0.58 (L) 2020 04:05 AM     Most Recent Creatinine Clearance:  CrCl: 94.6 mL/min (based on Adjusted Body Weight)  Creatinine: 0.70 mg/dL (2020)      Pharmacy Intervention:  Called and spoke to Dr. Es Teran  Reported that patient has received two Infed doses  Suggested labs to be drawn to check iron levels  Per Dr. Shanae Jules, draw CBC/ Ferritin/Iron Profile prior to next dose on 2020  Call physician with results prior to treating as patient may not need subsequent doses   Information conveyed to [facility] RN   Continue to monitor     Thank you,  Kristin Rodriguez, PharmD, M.S.  [address], Outpatient, [address]   [phone]     Pharmacist: KRAIG De Paz

## 2020-09-02 ENCOUNTER — TELEPHONE (OUTPATIENT)
Dept: ONCOLOGY | Age: 45
End: 2020-09-02

## 2020-09-02 ENCOUNTER — HOSPITAL ENCOUNTER (OUTPATIENT)
Dept: INFUSION THERAPY | Age: 45
Discharge: HOME | End: 2020-09-02
Payer: COMMERCIAL

## 2020-09-02 VITALS
TEMPERATURE: 98.6 F | SYSTOLIC BLOOD PRESSURE: 114 MMHG | HEART RATE: 97 BPM | OXYGEN SATURATION: 100 % | DIASTOLIC BLOOD PRESSURE: 77 MMHG

## 2020-09-02 DIAGNOSIS — Z90.710 S/P TAH (TOTAL ABDOMINAL HYSTERECTOMY): Primary | ICD-10-CM

## 2020-09-02 LAB
BASOPHILS # BLD: 0 K/UL (ref 0–0.1)
BASOPHILS NFR BLD: 0 % (ref 0–2)
DIFFERENTIAL METHOD BLD: ABNORMAL
EOSINOPHIL # BLD: 0 K/UL (ref 0–0.4)
EOSINOPHIL NFR BLD: 0 % (ref 0–5)
ERYTHROCYTE [DISTWIDTH] IN BLOOD BY AUTOMATED COUNT: 29.2 % (ref 11.6–14.5)
FERRITIN SERPL-MCNC: 646 NG/ML (ref 8–388)
HCT VFR BLD AUTO: 38.8 % (ref 35–45)
HGB BLD-MCNC: 11.9 G/DL (ref 12–16)
IRON SATN MFR SERPL: 26 % (ref 20–50)
IRON SERPL-MCNC: 64 UG/DL (ref 50–175)
LYMPHOCYTES # BLD: 2.2 K/UL (ref 0.9–3.6)
LYMPHOCYTES NFR BLD: 29 % (ref 21–52)
MCH RBC QN AUTO: 21.8 PG (ref 24–34)
MCHC RBC AUTO-ENTMCNC: 30.7 G/DL (ref 31–37)
MCV RBC AUTO: 71.1 FL (ref 74–97)
MONOCYTES # BLD: 0.3 K/UL (ref 0.05–1.2)
MONOCYTES NFR BLD: 4 % (ref 3–10)
NEUTS SEG # BLD: 5.2 K/UL (ref 1.8–8)
NEUTS SEG NFR BLD: 67 % (ref 40–73)
PLATELET # BLD AUTO: 313 K/UL (ref 135–420)
PLATELET COMMENTS,PCOM: ABNORMAL
RBC # BLD AUTO: 5.46 M/UL (ref 4.2–5.3)
RBC MORPH BLD: ABNORMAL
TIBC SERPL-MCNC: 246 UG/DL (ref 250–450)
WBC # BLD AUTO: 7.7 K/UL (ref 4.6–13.2)

## 2020-09-02 PROCEDURE — 83540 ASSAY OF IRON: CPT

## 2020-09-02 PROCEDURE — 85025 COMPLETE CBC W/AUTO DIFF WBC: CPT

## 2020-09-02 PROCEDURE — 36415 COLL VENOUS BLD VENIPUNCTURE: CPT

## 2020-09-02 PROCEDURE — 82728 ASSAY OF FERRITIN: CPT

## 2020-09-02 NOTE — TELEPHONE ENCOUNTER
Patient contacted the office regarding her back pain following surgery. She states she has not been able to sleep due to pain, she wakes up frequently due to pain and needs to get up and move to relieve the pain. Patient states she has run out of the Percocet and is using 500 mg Ibuprofen for pain. She requests a referral to physical therapy to help with getting in and out of bed, getting in and out of the tub/shower, toileting, and general physical needs. She can be reached at [phone] for any clarification.

## 2020-09-02 NOTE — PROGRESS NOTES
[facility] Progress Note    Date: 2020    Name: Mer Shields    MRN: [id number]         : 1975    Peripheral Lab Draw      Ms. Mcgee to [facility], ambulatory at 0332 accompanied by self. Pt was assessed and education was provided. Ms. Monika Estevez vitals were reviewed and patient was observed for 5 minutes prior to treatment. Visit Vitals  /77 (BP 1 Location: Right arm, BP Patient Position: Sitting)   Pulse 97   Temp 98.6 °F (37 °C)   SpO2 100%       Blood obtained peripherally from left arm x 1 attempt with butterfly needle and sent to lab for Cbc w/diff, Iron Profile and Ferritin per written orders. No bleeding or hematoma noted at site. Gauze and coban applied. Ms. Penelope Raymond tolerated the phlebotomy, and had no complaints. Patient armband removed and shredded. Ms. Penelope Raymond was discharged from [place] [address] in stable condition at 6023.      Adan Ham Phlebotomist PCT  2020  9:38 AM

## 2020-09-04 ENCOUNTER — HOSPITAL ENCOUNTER (OUTPATIENT)
Dept: INFUSION THERAPY | Age: 45
Discharge: HOME | End: 2020-09-04
Payer: COMMERCIAL

## 2020-09-04 NOTE — TELEPHONE ENCOUNTER
Called patient who reports back pain and would like PT assistance to help with ADLs, mobility. PT order placed and they will contact patient. Advised patient to return call to our office if she does not hear from PT by Tuesday. Reviewed OTC pain medication dosing recommendations. Advised patient to call with any questions or concerns prior to next appointment, confirmed for 9/10. All questions answered. Patient agrees with plan of care.

## 2020-09-08 ENCOUNTER — TELEPHONE (OUTPATIENT)
Dept: ONCOLOGY | Age: 45
End: 2020-09-08

## 2020-09-09 NOTE — TELEPHONE ENCOUNTER
Spoke with patient who confirmed she has an appointment scheduled for home health/PT. Also rescheduled post op appointment due to provider out of office.

## 2020-09-10 NOTE — PROGRESS NOTES
[address] [facility]  [address] [place] [address], [address], [address] [facility]  [address] [facility], [address] [place] [address]  "[employer] [address]   [phone]  Angelina Manzano DO        Postoperative Office Note  Patient ID:  Name: Damian Stephenson  MRN: [id number]  : 1975 39 y.o. Date: 2020    SUBJECTIVE:    This is a 74 y.o.  female who presents s/p Exploratory laparotomy, total abdominal hysterectomy, bilateral salpingectomies on 8/10/20. Doing better. Still has some pain in left hip and numbness in left thigh but planning to see PT. No bleeding. Some pain in incision and occasional vaginal spotting. Her pathology revealed     UTERUS, TOTAL HYSTERECTOMY WITH BILATERAL SALPINGECTOMY:   PROLIFERATIVE ENDOMETRIUM. MULTIPLE LEIOMYOMAS OF CORPUS. BENIGN ENDOCERVICAL POLYP (1.0 CM). BENIGN LEFT AND RIGHT FALLOPIAN TUBES. Medications:     Current Outpatient Prescriptions on File Prior to Visit   Medication Sig Dispense Refill    acetaminophen (TYLENOL) 500 mg tablet Take 500 mg by mouth every six (6) hours as needed for Pain.  Omega-3-DHA-EPA-Fish Oil (FISH OIL) 1,000 mg (120 mg-180 mg) cap Take 2 Caps by mouth.  traMADol (ULTRAM) 50 mg tablet Take 1 Tab by mouth every six (6) hours as needed for Pain. Max Daily Amount: 200 mg. 60 Tab 0    azelastine (ASTELIN) 137 mcg (0.1 %) nasal spray 1 [place] by Nasal route as needed.  REPATHA SURECLICK pen injection every fourteen (14) days.  fluticasone (FLONASE) 50 mcg/actuation nasal spray 1 Spray by Nasal route as needed.  levothyroxine (SYNTHROID) 88 mcg tablet 88 mcg daily.  aspirin delayed-release 81 mg tablet 81 mg daily.  cholecalciferol (VITAMIN D3) 1,000 unit tablet 1,000 Units.  furosemide (LASIX) 40 mg tablet 40 mg daily.  magnesium oxide (MAG-OX) 400 mg tablet 400 mg daily.       metoprolol tartrate (LOPRESSOR) 25 mg tablet 25 mg two (2) times a day.  potassium chloride (K-DUR, KLOR-CON) 10 mEq tablet 20 mEq two (2) times a day.  metoclopramide HCl (REGLAN) 10 mg tablet 10 mg two (2) times a day.  valsartan (DIOVAN) 40 mg tablet 40 mg nightly.  cyclobenzaprine (FLEXERIL) 10 mg tablet 10 mg three (3) times daily as needed.  chlorzoxazone (PARAFON FORTE) 500 mg tablet as needed.  bumetanide (BUMEX) 1 mg tablet 1 mg daily. Indications: Edema      lansoprazole (PREVACID) 30 mg capsule Take  by mouth two (2) times a day. No current facility-administered medications on file prior to visit. Allergies: Allergies   Allergen Reactions    Adhesive Tape-Silicones Other (comments)    Adhesive Tape-Silicones Rash and Itching    Allopurinol Other (comments)     Lightheaded.  Codeine Other (comments)    Codeine Nausea and Vomiting     Generic form only per patient    Ezetimibe-Simvastatin Myalgia    Ibuprofen Hives    Ibuprofen Unknown (comments)     Ask patient    Iodinated Contrast- Oral And Iv Dye Other (comments)     Has done well if premedicated    Rosuvastatin Myalgia    Statins-Hmg-Coa Reductase Inhibitors Other (comments)    Statins-Hmg-Coa Reductase Inhibitors Myalgia       OBJECTIVE:    Vitals:   Visit Vitals  /81 (BP 1 Location: Right arm, BP Patient Position: Sitting)   Pulse 73   Temp 96.9 °F (36.1 °C) (Oral)   Resp 12   Ht 5' 3\" (1.6 m)   Wt 67.4 kg (148 lb 9.6 oz)   SpO2 100%   BMI 26.32 kg/m²         Physical Examination:    General:  alert, cooperative, no distress   Abdomen: soft, bowel sounds active, non-tender   Incision: healing well,    Pelvic: NEFG, spec exam revealed vaginal cuff intact with small amount of dischargek BME revealed cuff intact, NT   Rectal: not done   Extremity:   extremities normal, atraumatic, no cyanosis or edema     IMPRESSION/PLAN:    Gen Julio is doing well postoperatively. She has a working diagnosis of benign uterine fibroids.   The operative procedures and clinical results have been reviewed with the patient. Postop pain. -improving  Left leg numbness, favor positioning during surgery. Has PT scheduled should expect for this to improve as she gets further out from surgery. I will see the patient back prnThe patient is advised to call our office with any problems or concerns.     Home Pace DO  Gynecologic Oncology  9/11/2020 9:52 AM

## 2020-09-11 ENCOUNTER — OFFICE VISIT (OUTPATIENT)
Dept: ONCOLOGY | Age: 45
End: 2020-09-11

## 2020-09-11 VITALS
BODY MASS INDEX: 26.33 KG/M2 | WEIGHT: 148.6 LBS | HEIGHT: 63 IN | HEART RATE: 73 BPM | TEMPERATURE: 96.9 F | SYSTOLIC BLOOD PRESSURE: 124 MMHG | DIASTOLIC BLOOD PRESSURE: 81 MMHG | RESPIRATION RATE: 12 BRPM | OXYGEN SATURATION: 100 %

## 2020-09-11 DIAGNOSIS — D25.9 UTERINE LEIOMYOMA, UNSPECIFIED LOCATION: Primary | ICD-10-CM

## 2020-09-11 NOTE — LETTER
9/11/20 Patient: Breana Pope YOB: 1975 Date of Visit: 9/11/2020 Jaison Rangel MD 
[address] [facility] [address] [facility] [address] VIA Facsimile: [phone] Dear Jaison Rangel MD, Thank you for [facility]. My notes for this consultation are attached. If you have questions, please do not hesitate to call me. I look forward to following your patient along with you. Sincerely, Sherita Buchanan MD

## 2020-09-11 NOTE — PROGRESS NOTES
Luan Hernandez is a 39 y.o. female presents in office for 4 week post surgical follow-up     Chief Complaint   Patient presents with    Follow-up     4 week       Patient states has slight pain on left upper thigh region. Patient states has some pain in the pelvic region. Do you have any unusual vaginal bleeding, discharge or irritation? No     Do you have any changes in your urination or bowel movements? No     Have you been experiencing any continuous or worsening abdominal pain?  No     Visit Vitals  /81 (BP 1 Location: Right arm, BP Patient Position: Sitting)   Pulse 73   Temp 96.9 °F (36.1 °C) (Oral)   Resp 12   Ht 5' 3\" (1.6 m)   Wt 148 lb 9.6 oz (67.4 kg)   SpO2 100%   BMI 26.32 kg/m²         Health Maintenance Due   Topic Date Due    Lipid Screen  03/29/2018    Flu Vaccine (1) 09/01/2020       3 most recent PHQ Screens 7/28/2020   Little interest or pleasure in doing things Not at all   Feeling down, depressed, irritable, or hopeless Not at all   Total Score PHQ 2 0     Learning Assessment 6/12/2017   PRIMARY LEARNER Patient   HIGHEST LEVEL OF EDUCATION - PRIMARY LEARNER  SOME COLLEGE   BARRIERS PRIMARY LEARNER NONE   CO-LEARNER CAREGIVER No   PRIMARY LANGUAGE ENGLISH   LEARNER PREFERENCE PRIMARY DEMONSTRATION     -   ANSWERED BY patient   RELATIONSHIP SELF

## 2020-09-29 ENCOUNTER — OFFICE VISIT (OUTPATIENT)
Dept: ONCOLOGY | Age: 45
End: 2020-09-29
Payer: COMMERCIAL

## 2020-09-29 VITALS
HEART RATE: 76 BPM | RESPIRATION RATE: 16 BRPM | TEMPERATURE: 98.3 F | SYSTOLIC BLOOD PRESSURE: 113 MMHG | OXYGEN SATURATION: 99 % | DIASTOLIC BLOOD PRESSURE: 88 MMHG | HEIGHT: 63 IN | WEIGHT: 153 LBS | BODY MASS INDEX: 27.11 KG/M2

## 2020-09-29 DIAGNOSIS — D25.9 UTERINE LEIOMYOMA, UNSPECIFIED LOCATION: Primary | ICD-10-CM

## 2020-09-29 PROCEDURE — 99024 POSTOP FOLLOW-UP VISIT: CPT | Performed by: OBSTETRICS & GYNECOLOGY

## 2020-09-29 NOTE — LETTER
NOTIFICATION OF RETURN TO WORK / SCHOOL 
 
9/29/2020 11:41 AM 
 
Ms. Yvonne Rothman [address] [facility] [address] [facility] [address] [phone] [place] To Whom It May Concern: Yvonne Rothman was under the care of Pedro Spears on 9/29/2020. She will be able to return to work on 10/12/2020 without restrictions. If there are questions or concerns please have the patient contact our office. Sincerely, Katina Lake MD

## 2020-09-29 NOTE — LETTER
NOTIFICATION OF RETURN TO WORK / SCHOOL 
 
9/29/2020 11:48 AM 
 
Ms. David Richter [address] [facility] [address] Located within [facility] 83 [phone] Jovita Garcia To Whom It May Concern: David Richter was under the care of Pedro Spears on 9/29/2020. She will be able to return to work on 10/12/2020 with no lifting, pushing, pulling, or carrying over 10 lbs until 11/2/2020. If there are questions or concerns please have the patient contact our office. Sincerely, Kishor Patel MD

## 2020-09-29 NOTE — LETTER
9/29/20 Patient: Judy Huerta YOB: 1975 Date of Visit: 9/29/2020 Brittany Monzon MD 
[address] [facility] [address] [place] [id number] VIA Facsimile: [phone] Dear Brittany Monzon MD, Thank you for [facility]. My notes for this consultation are attached. If you have questions, please do not hesitate to call me. I look forward to following your patient along with you. Sincerely, Cele Bishop MD

## 2020-09-29 NOTE — PROGRESS NOTES
[address] [facility]  [address] [place] [address], [address], [address] [facility]  [address] [facility], [address] [place] [address]  [place], [address]   [phone]  Alva Ngo DO        Postoperative Office Note  Patient ID:  Name: Basilio Stroud  MRN: [id number]  : 1975 39 y.o. Date: 2020    SUBJECTIVE:    This is a 74 y.o.  female who presents s/p Exploratory laparotomy, total abdominal hysterectomy, bilateral salpingectomies on 8/10/20. Doing better. Still has some pain in left hip and numbness in left thigh and is doing PT with improvement  No bleeding. Some pain in incision. Her pathology revealed     UTERUS, TOTAL HYSTERECTOMY WITH BILATERAL SALPINGECTOMY:   PROLIFERATIVE ENDOMETRIUM. MULTIPLE LEIOMYOMAS OF CORPUS. BENIGN ENDOCERVICAL POLYP (1.0 CM). BENIGN LEFT AND RIGHT FALLOPIAN TUBES. Medications:     Current Outpatient Prescriptions on File Prior to Visit   Medication Sig Dispense Refill    acetaminophen (TYLENOL) 500 mg tablet Take 500 mg by mouth every six (6) hours as needed for Pain.  Omega-3-DHA-EPA-Fish Oil (FISH OIL) 1,000 mg (120 mg-180 mg) cap Take 2 Caps by mouth.  traMADol (ULTRAM) 50 mg tablet Take 1 Tab by mouth every six (6) hours as needed for Pain. Max Daily Amount: 200 mg. 60 Tab 0    azelastine (ASTELIN) 137 mcg (0.1 %) nasal spray 1 [place] by Nasal route as needed.  REPATHA SURECLICK pen injection every fourteen (14) days.  fluticasone (FLONASE) 50 mcg/actuation nasal spray 1 Spray by Nasal route as needed.  levothyroxine (SYNTHROID) 88 mcg tablet 88 mcg daily.  aspirin delayed-release 81 mg tablet 81 mg daily.  cholecalciferol (VITAMIN D3) 1,000 unit tablet 1,000 Units.  furosemide (LASIX) 40 mg tablet 40 mg daily.  magnesium oxide (MAG-OX) 400 mg tablet 400 mg daily.  metoprolol tartrate (LOPRESSOR) 25 mg tablet 25 mg two (2) times a day.  potassium chloride (K-DUR, KLOR-CON) 10 mEq tablet 20 mEq two (2) times a day.  metoclopramide HCl (REGLAN) 10 mg tablet 10 mg two (2) times a day.  valsartan (DIOVAN) 40 mg tablet 40 mg nightly.  cyclobenzaprine (FLEXERIL) 10 mg tablet 10 mg three (3) times daily as needed.  chlorzoxazone (PARAFON FORTE) 500 mg tablet as needed.  bumetanide (BUMEX) 1 mg tablet 1 mg daily. Indications: Edema      lansoprazole (PREVACID) 30 mg capsule Take  by mouth two (2) times a day. No current facility-administered medications on file prior to visit. Allergies: Allergies   Allergen Reactions    Adhesive Tape-Silicones Other (comments)    Adhesive Tape-Silicones Rash and Itching    Allopurinol Other (comments)     Lightheaded.  Codeine Other (comments)    Codeine Nausea and Vomiting     Generic form only per patient    Ezetimibe-Simvastatin Myalgia    Ibuprofen Hives    Ibuprofen Unknown (comments)     Ask patient    Iodinated Contrast- Oral And Iv Dye Other (comments)     Has done well if premedicated    Rosuvastatin Myalgia    Statins-Hmg-Coa Reductase Inhibitors Other (comments)    Statins-Hmg-Coa Reductase Inhibitors Myalgia       OBJECTIVE:    Vitals:   Visit Vitals  /88 (BP 1 Location: Left arm, BP Patient Position: Sitting)   Pulse 76   Temp 98.3 °F (36.8 °C) (Oral)   Resp 16   Ht 5' 3\" (1.6 m)   Wt 69.4 kg (153 lb)   SpO2 99%   BMI 27.10 kg/m²         Physical Examination:    General:  alert, cooperative, no distress   Abdomen: soft, bowel sounds active, non-tender   Incision: healing well,    Pelvic: deferred   Rectal: not done   Extremity:   extremities normal, atraumatic, no cyanosis or edema     IMPRESSION/PLAN:    Anika Monzon is doing well postoperatively. She has a working diagnosis of benign uterine fibroids. The operative procedures and clinical results have been reviewed with the patient. Postop pain. -improving  Left leg numbness, favor positioning during surgery. Undergoing PT  Will extend time out of work to 10/12  I will see the patient back prnThe patient is advised to call our office with any problems or concerns.     Mart Hodges DO  Gynecologic Oncology  9/29/2020 9:52 AM

## 2020-09-29 NOTE — PROGRESS NOTES
Sandra Young is a 39 y.o. female presents in office for surgical follow up, exploratory laparotomy, total abdominal hysterectomy, bilateral salpingectomies. Chief Complaint   Patient presents with    Surgical Follow-up        Do you have any unusual vaginal bleeding, discharge or irritation? No  Do you have any changes in your bowel movements? No  Have you been experiencing nausea or vomiting? No  Have you been experiencing any continuous or worsening abdominal pain? Pt c/o 6/10 abdominal pain, worsens with cough and certain movements. Any urinary burning? No    Visit Vitals  /88 (BP 1 Location: Left arm, BP Patient Position: Sitting)   Pulse 76   Temp 98.3 °F (36.8 °C) (Oral)   Resp 16   Ht 5' 3\" (1.6 m)   Wt 69.4 kg (153 lb)   SpO2 99%   BMI 27.10 kg/m²         1. Have you been to the ER, urgent care clinic since your last visit? Hospitalized since your last visit? No    2. Have you seen or consulted any other health care providers outside of the [address] [place] [address] since your last visit? Include any pap smears or colon screening.  No    3 most recent PHQ Screens 7/28/2020   Little interest or pleasure in doing things Not at all   Feeling down, depressed, irritable, or hopeless Not at all   Total Score PHQ 2 0       Learning Assessment 6/12/2017   PRIMARY LEARNER Patient   HIGHEST LEVEL OF EDUCATION - PRIMARY LEARNER  SOME COLLEGE   BARRIERS PRIMARY LEARNER NONE   CO-LEARNER CAREGIVER No   PRIMARY LANGUAGE ENGLISH   LEARNER PREFERENCE PRIMARY DEMONSTRATION     -   ANSWERED BY patient   RELATIONSHIP SELF

## 2020-10-13 DIAGNOSIS — M25.552 LEFT HIP PAIN: Primary | ICD-10-CM

## 2020-10-13 DIAGNOSIS — R20.0 LEFT LEG NUMBNESS: ICD-10-CM

## 2020-10-13 DIAGNOSIS — R29.898 LEFT LEG WEAKNESS: ICD-10-CM

## 2020-10-14 ENCOUNTER — APPOINTMENT (OUTPATIENT)
Dept: PHYSICAL THERAPY | Age: 45
End: 2020-10-14

## 2020-10-14 ENCOUNTER — HOSPITAL ENCOUNTER (OUTPATIENT)
Dept: PHYSICAL THERAPY | Age: 45
Discharge: HOME | End: 2020-10-14
Payer: COMMERCIAL

## 2020-10-14 PROCEDURE — 97110 THERAPEUTIC EXERCISES: CPT

## 2020-10-14 PROCEDURE — 97161 PT EVAL LOW COMPLEX 20 MIN: CPT

## 2020-10-14 PROCEDURE — 97140 MANUAL THERAPY 1/> REGIONS: CPT

## 2020-10-14 NOTE — PROGRESS NOTES
PHYSICAL THERAPY - DAILY TREATMENT NOTE    Patient Name: Rodolfo Aceves        Date: 10/14/2020  : 1975   YES Patient  Verified  Visit #:     Insurance: Payor: Adrienne Hernandes / Plan: Dennis Dwyer PPO / Product Type: PPO /      In time: 12:54 Out time: 2:02   Total Treatment Time: 68     Medicare Time /BCBS Tracking (below)   Total Timed Codes (min):  na 1:1 Treatment Time:  na     TREATMENT AREA =  Left hip pain [M25.552]  Left leg weakness [R29.898]  Left leg numbness [R20.0]    SUBJECTIVE  Pain Level (on 0 to 10 scale):  0  / 10   Medication Changes/New allergies or changes in medical history, any new surgeries or procedures?     NO    If yes, update Summary List   Subjective Functional Status/Changes:  []  No changes reported     See POC          OBJECTIVE  Modalities Rationale:     decrease pain and increase tissue extensibility to improve patient's ability to bend over   min [] Estim, type/location:                                      []  att     []  unatt     []  w/US     []  w/ice    []  w/heat    min []  Mechanical Traction: type/lbs                   []  pro   []  sup   []  int   []  cont    []  before manual    []  after manual    min []  Ultrasound, settings/location:      min []  Iontophoresis w/ dexamethasone, location:                                               []  take home patch       []  in clinic   10 min [x]  Ice     []  Heat    location/position: Supine bilateral pelvic    min []  Vasopneumatic Device, press/temp:     min []  Other:    [x] Skin assessment post-treatment (if applicable):    []  intact    []  redness- no adverse reaction     []redness  adverse reaction:          10 min Manual Therapy: Release/DTM to bilateral iliopsoas muscles   Rationale:      decrease pain, increase tissue extensibility and decrease trigger points to improve patient's ability to perform bed mobility       13 min Patient Education:  YES  Reviewed POC/Goals/ TA for HEP   []  Progressed/Changed HEP based on: Other Objective/Functional Measures:    See POC     Post Treatment Pain Level (on 0 to 10) scale:   0  / 10     ASSESSMENT  Assessment/Changes in Function:     Justification for Eval Code Complexity:  Patient History : See POC  Examination see exam   Clinical Presentation: evolving  Clinical Decision Making : FOTO : 86 /100     []  See Progress Note/Recertification   Patient will continue to benefit from skilled PT services to modify and progress therapeutic interventions, address functional mobility deficits, address ROM deficits, address strength deficits, analyze and address soft tissue restrictions, analyze and modify body mechanics/ergonomics and instruct in home and community integration to attain remaining goals. Progress toward goals / Updated goals:    Initial evaluation completed with home exercise program and education initiated. PLAN  [x]  Upgrade activities as tolerated YES Continue plan of care   []  Discharge due to :    []  Other:      Therapist: Finn Dougherty PT    Date: 10/14/2020 Time: 2:02 PM     No future appointments.

## 2020-10-14 NOTE — PROGRESS NOTES
[address] PHYSICAL THERAPY AT [address], [place] [address] [facility], Chrissy Herzog [address] - Phone: [phone]  Fax: [phone] / [facility]  Patient Name: Puja Josue : 1975   Medical   Diagnosis: Left hip pain [M25.552]  Left leg weakness [R29.898]  Left leg numbness [R20.0] Treatment Diagnosis: Left hip pain [M25.552]  Left leg weakness [R29.898]  Left leg numbness [R20.0]   Onset Date: 8/10/2020 DOS     Referral Source: Brenna Fenton NP Start of Care [facility]): 10/14/2020   Prior Hospitalization: See medical history Provider #: [id number]   Prior Level of Function: Symptom free with ADLs   Comorbidities: Gi, P1  delivery    Medications: Verified on Patient Summary List   The Plan of Care and following information is based on the information from the initial evaluation.   ==================================================================================  Assessment / key information: Patient  is a 39 y.o. yo female who presents to [facility] with diagnosis of Left hip pain [M25.552]  Left leg weakness [R29.898]  Left leg numbness [R20.0]. Patient reports left anterior thigh numbness, lower abdominal pain with bending over and quick movements and inability to lie on stomach due to pain and pressure which began following a VIDAL 8/10/2020. The numbness has been gradually improving. Pain level is an intermittent 8/10. 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 Restrictions from surgeon include no lifting > 10 pounds, pushing, vacuuming, mopping or pulling. Upon objective evaluation, patient demonstrates impaired and painful trunk AROM, decreased core strength,  Tenderness to palpation over bilateral iliopsoas muscles near the illiac crest, decreased scar mobility with thickening and impaired muscular flexibility.  Patient scored 86 on FOTO indicating decreased functional status and quality of life. Patient would benefit from PT to address these deficits for increased functional mobility and quality of life. Gait: WNLs. Postural deviations: WNL. SI symmetry: Forward flexion special test negative  Trunk AROM: flexion to ankles with pain, extension 50% with pain, right side bend 75% with pain, left side bend 75%, right rotation 100%, left rotation 100% with pain. Manual muscle test: Psoas right 5/5, left 5/5. Gluteus medius right 5/5, left 5/5, gluteus angel right 4+/5, left 4+/5 , Quadriceps right 5/5, left 5/5, Hamstrings right 5/5, left 5/5, Anterior tibialis right 5/5 , left 5/5, Extensor Hallucis right 5/5, left 5/5  Special Tests : SLR -, Kev Holy -. Flexibility: Passive SLR right 70/90,  left 60/90. Rajinder test right +, left +. Palpation :Tender over bilateral iliopsoas muscles near the iliac crest.  Decreased VIDAL scar mobility with thickening.   Sensation to light touch: Decreased left anterior thigh.  ==================================================================================  Eval Complexity: History: MEDIUM  Complexity : 1-2 comorbidities / personal factors will impact the outcome/ POC Exam:HIGH Complexity : 4+ Standardized tests and measures addressing body structure, function, activity limitation and / or participation in recreation  Presentation: MEDIUM Complexity : Evolving with changing characteristics  Clinical Decision Making:LOW Complexity : FOTO score of 75-100Overall Complexity:LOW   Problem List: pain affecting function, decrease ROM, decrease strength, decrease ADL/ functional abilitiies, decrease activity tolerance and decrease flexibility/ joint mobility   Treatment Plan may include any combination of the following: Therapeutic exercise, Therapeutic activities, Neuromuscular re-education, Physical agent/modality, Manual therapy, Patient education and Functional mobility training  Patient / Family readiness to learn indicated by: asking questions, trying to perform skills and interest  Persons(s) to be included in education: patient (P)  Barriers to Learning/Limitations: None  Measures taken:    Patient Goal (s): \"Get activity level up, be able to sleep on my stomach, be able to wear normal size clothing, other ways of doing things\"   Patient self reported health status: good  Rehabilitation Potential: excellent   Short Term Goals: To be accomplished in 3  weeks:  1) Patient performing daily HEP. 2) Patient will score +2 on GROC indicating symptoms a little better. 3) Patient able to perform heel slides off bed without abdominal bulging.  Long Term Goals: To be accomplished in 6 weeks:  1) Patient  independent  with HEP and able to demo proper body mechanics for floor to chest lifting. 2) Patient will increase trunk AROM to WNLs all planes with pain no > 1-2/10 to increase ability to perform ADLs such as bending over to  an item. 3) Increase FOTO to 89 indicating improved function and quality of life. 4) Patient able to lie on her stomach for 10-15 minutes. Frequency / Duration:   Patient to be seen  2  times per week for 6  weeks:  Patient / Caregiver education and instruction: activity modification and exercises  G-Codes (GP): kamini    Therapist Signature: Chaz Rucker PT Date: 51/31/7745   Certification Period: na Time: 12:56 PM   ==================================================================================  I certify that the above Physical Therapy Services are being furnished while the patient is under my care. I agree with the treatment plan and certify that this therapy is necessary. Physician Signature:        Date:       Time:     Please sign and return to [facility] or you may fax the signed copy to [phone]. Thank you.

## 2020-11-09 ENCOUNTER — TELEPHONE (OUTPATIENT)
Dept: ONCOLOGY | Age: 45
End: 2020-11-09

## 2020-11-09 NOTE — LETTER
NOTIFICATION OF RETURN TO WORK / SCHOOL 
 
11/9/2020 4:21 PM 
 
Ms. Samaria Jordan [address] [facility] 83 [phone] [place] To Whom It May Concern: Samaria Jordan was under the care of Pedro Spears. She will be able to return to work with no restrictions. If there are questions or concerns please have the patient contact our office. Sincerely, Geraldine Grullon MD

## 2020-11-09 NOTE — TELEPHONE ENCOUNTER
Patient requested return to work letter without restrictions.
Fracture of shaft of left femur  1971 compound fracrture left femur  1980 left femur bone spur removed  S/P skin graft using Integra  right calf skin graft

## 2020-11-12 NOTE — PROGRESS NOTES
[address] Chrissy Wong  Phone: [phone]  Fax: [phone] SUMMARY  Patient Name: Tania Polanco : 1975   Treatment/Medical Diagnosis: Left hip pain [M25.552]  Left leg weakness [R29.898]  Left leg numbness [R20.0]   Referral Source: Sierra Ayala NP     Date of Initial Visit: 10/14/2020 Attended Visits: 1 Missed Visits: 0     SUMMARY OF TREATMENT  Patient only seen for initial evaluation then did not return to PT. Unable to reassess goals as stated below. 1) Patient performing daily HEP. 2) Patient will score +2 on GROC indicating symptoms a little better. 3) Patient able to perform heel slides off bed without abdominal bulging. RECOMMENDATIONS  Discontinue therapy due to lack of attendance or compliance. If you have any questions/comments please contact us directly at [phone]. Thank you for allowing us to assist in the care of your patient. Therapist Signature:  Bren Powell PT Date: 10/14/2020     Time: 10:01 AM

## 2021-05-10 ENCOUNTER — VIRTUAL VISIT (OUTPATIENT)
Dept: FAMILY MEDICINE CLINIC | Age: 46
End: 2021-05-10
Payer: COMMERCIAL

## 2021-05-10 DIAGNOSIS — Z13.220 SCREENING FOR HYPERLIPIDEMIA: ICD-10-CM

## 2021-05-10 DIAGNOSIS — Z00.00 PREVENTATIVE HEALTH CARE: ICD-10-CM

## 2021-05-10 DIAGNOSIS — Z12.31 ENCOUNTER FOR SCREENING MAMMOGRAM FOR MALIGNANT NEOPLASM OF BREAST: Primary | ICD-10-CM

## 2021-05-10 DIAGNOSIS — Z13.1 SCREENING FOR DIABETES MELLITUS: ICD-10-CM

## 2021-05-10 PROCEDURE — 90000 NO LOS: CPT | Performed by: NURSE PRACTITIONER

## 2021-05-10 NOTE — Clinical Note
Please call patient for face-to-face exam.  She says she can come in Friday morning to get her labs done.   Thanks

## 2021-05-10 NOTE — PROGRESS NOTES
HISTORY OF PRESENT ILLNESS  Jovana Burch is a 39 y.o. female seen to establish care and mammogram concern     Analisa Medina, who was evaluated through a synchronous (real-time) audio-video encounter, and/or her healthcare decision maker, is aware that it is a billable service, with coverage as determined by her insurance carrier. She provided verbal consent to proceed: Yes, and patient identification was verified. This visit was conducted pursuant to the emergency declaration under the [facility], [address] [place] [address] and the [employer] Act. A caregiver was present when appropriate. Ability to conduct physical exam was limited. The patient was located in a state where the provider was credentialed to provide care. --Feli Wright NP on 5/10/2021 at 10:07 AM  HPI  Patient is new to clinic. Patient is establishing care with me today. Patient had a total hysterectomy last year by Dr. Chip Lin. Per patient she had uterine fibroids that cause her to \"bleed a lot. \"   Patient says she is well and she looks well. She is wanting to get in for a complete exam and routine blood work. Patient reports her appetite is good, no urinary issues and regular bowel movements. Patient denies fever, chills, shortness of breath, chest pain, abdomen pain or dark tarry stool. Patient has agreed to make an appointment to come in for a physical exam within the next 2 weeks. She will come in to do her labs first as she is on vacation this week. Review of Systems   Constitutional: Negative for chills, fever, malaise/fatigue and weight loss. HENT: Negative for congestion, ear pain, nosebleeds, sinus pain and sore throat. Eyes: Negative. Respiratory: Negative for cough and shortness of breath. Cardiovascular: Negative. Gastrointestinal: Negative. Genitourinary: Negative. Musculoskeletal: Negative. Skin: Negative. Neurological: Negative. Endo/Heme/Allergies: Negative. Psychiatric/Behavioral: Negative for depression. The patient is not nervous/anxious. There were no vitals taken for this visit. Physical Exam  Constitutional:       Appearance: Normal appearance. Eyes:      General:         Right eye: No discharge. Left eye: No discharge. Conjunctiva/sclera: Conjunctivae normal.   Neck:      Musculoskeletal: Normal range of motion. Comments: No visible mass  Pulmonary:      Effort: Pulmonary effort is normal.   Neurological:      Mental Status: She is alert and oriented to person, place, and time. Psychiatric:         Mood and Affect: Mood normal.         Behavior: Behavior normal.         Thought Content: Thought content normal.         Judgment: Judgment normal.       Past Medical History:   Diagnosis Date    Anemia NEC as a child    iron deficiency     Breast lump 01/25/2017    Left Breast Lump    Fibroid      Patient Active Problem List   Diagnosis Code    Low back pain M54.5    Anemia due to blood loss, chronic D50.0    Fibroid uterus D25.9     Current Outpatient Medications on File Prior to Visit   Medication Sig Dispense Refill    acetaminophen (TYLENOL) 500 mg tablet Take 500 mg by mouth every six (6) hours as needed for Pain. No current facility-administered medications on file prior to visit. ASSESSMENT and PLAN    ICD-10-CM ICD-9-CM    1. Encounter for screening mammogram for malignant neoplasm of breast  Z12.31 V76.12 ЕКАТЕРИНА 3D BELKYS W MAMMO BI SCREENING INCL CAD   2. Screening for hyperlipidemia  Z13.220 V77.91 LIPID PANEL   3. Screening for diabetes mellitus  Z13.1 V77.1 HEMOGLOBIN A1C WITH EAG   4. Preventative health care  Z00.00 V70.0 LIPID PANEL      T4, FREE      TSH 3RD GENERATION      CBC WITH AUTOMATED DIFF      URINALYSIS W/MICROSCOPIC      METABOLIC PANEL, COMPREHENSIVE     Follow-up and Dispositions    · Return in about 2 weeks (around 5/24/2021).

## 2021-05-14 ENCOUNTER — APPOINTMENT (OUTPATIENT)
Dept: FAMILY MEDICINE CLINIC | Age: 46
End: 2021-05-14

## 2021-05-14 ENCOUNTER — HOSPITAL ENCOUNTER (OUTPATIENT)
Dept: LAB | Age: 46
Discharge: HOME | End: 2021-05-14
Payer: COMMERCIAL

## 2021-05-14 DIAGNOSIS — Z13.220 SCREENING FOR HYPERLIPIDEMIA: ICD-10-CM

## 2021-05-14 DIAGNOSIS — Z00.00 PREVENTATIVE HEALTH CARE: ICD-10-CM

## 2021-05-14 DIAGNOSIS — Z13.1 SCREENING FOR DIABETES MELLITUS: ICD-10-CM

## 2021-05-14 LAB
ALBUMIN SERPL-MCNC: 3.4 G/DL (ref 3.4–5)
ALBUMIN/GLOB SERPL: 1 {RATIO} (ref 0.8–1.7)
ALP SERPL-CCNC: 64 U/L (ref 45–117)
ALT SERPL-CCNC: 17 U/L (ref 13–56)
ANION GAP SERPL CALC-SCNC: 6 MMOL/L (ref 3–18)
APPEARANCE UR: CLEAR
AST SERPL-CCNC: 9 U/L (ref 10–38)
BASOPHILS # BLD: 0 K/UL (ref 0–0.1)
BASOPHILS NFR BLD: 0 % (ref 0–2)
BILIRUB SERPL-MCNC: 0.2 MG/DL (ref 0.2–1)
BILIRUB UR QL: NEGATIVE
BUN SERPL-MCNC: 6 MG/DL (ref 7–18)
BUN/CREAT SERPL: 11 (ref 12–20)
CALCIUM SERPL-MCNC: 8.6 MG/DL (ref 8.5–10.1)
CHLORIDE SERPL-SCNC: 106 MMOL/L (ref 100–111)
CHOLEST SERPL-MCNC: 174 MG/DL
CO2 SERPL-SCNC: 28 MMOL/L (ref 21–32)
COLOR UR: YELLOW
CREAT SERPL-MCNC: 0.55 MG/DL (ref 0.6–1.3)
DIFFERENTIAL METHOD BLD: ABNORMAL
EOSINOPHIL # BLD: 0.1 K/UL (ref 0–0.4)
EOSINOPHIL NFR BLD: 1 % (ref 0–5)
ERYTHROCYTE [DISTWIDTH] IN BLOOD BY AUTOMATED COUNT: 17.2 % (ref 11.6–14.5)
EST. AVERAGE GLUCOSE BLD GHB EST-MCNC: 108 MG/DL
GLOBULIN SER CALC-MCNC: 3.4 G/DL (ref 2–4)
GLUCOSE SERPL-MCNC: 74 MG/DL (ref 74–99)
GLUCOSE UR STRIP.AUTO-MCNC: NEGATIVE MG/DL
HBA1C MFR BLD: 5.4 % (ref 4.2–5.6)
HCT VFR BLD AUTO: 41.2 % (ref 35–45)
HDLC SERPL-MCNC: 47 MG/DL (ref 40–60)
HDLC SERPL: 3.7 {RATIO} (ref 0–5)
HGB BLD-MCNC: 12.8 G/DL (ref 12–16)
HGB UR QL STRIP: NEGATIVE
KETONES UR QL STRIP.AUTO: NEGATIVE MG/DL
LDLC SERPL CALC-MCNC: 107.2 MG/DL (ref 0–100)
LEUKOCYTE ESTERASE UR QL STRIP.AUTO: NEGATIVE
LIPID PROFILE,FLP: ABNORMAL
LYMPHOCYTES # BLD: 2.2 K/UL (ref 0.9–3.6)
LYMPHOCYTES NFR BLD: 33 % (ref 21–52)
MCH RBC QN AUTO: 22.4 PG (ref 24–34)
MCHC RBC AUTO-ENTMCNC: 31.1 G/DL (ref 31–37)
MCV RBC AUTO: 72.2 FL (ref 74–97)
MONOCYTES # BLD: 0.7 K/UL (ref 0.05–1.2)
MONOCYTES NFR BLD: 10 % (ref 3–10)
NEUTS SEG # BLD: 3.5 K/UL (ref 1.8–8)
NEUTS SEG NFR BLD: 54 % (ref 40–73)
NITRITE UR QL STRIP.AUTO: NEGATIVE
PH UR STRIP: 7 [PH] (ref 5–8)
PLATELET # BLD AUTO: 274 K/UL (ref 135–420)
POTASSIUM SERPL-SCNC: 4 MMOL/L (ref 3.5–5.5)
PROT SERPL-MCNC: 6.8 G/DL (ref 6.4–8.2)
PROT UR STRIP-MCNC: NEGATIVE MG/DL
RBC # BLD AUTO: 5.71 M/UL (ref 4.2–5.3)
SODIUM SERPL-SCNC: 140 MMOL/L (ref 136–145)
SP GR UR REFRACTOMETRY: 1.01 (ref 1–1.03)
T4 FREE SERPL-MCNC: 0.9 NG/DL (ref 0.7–1.5)
TRIGL SERPL-MCNC: 99 MG/DL (ref ?–150)
TSH SERPL DL<=0.05 MIU/L-ACNC: 2.22 UIU/ML (ref 0.36–3.74)
UROBILINOGEN UR QL STRIP.AUTO: 1 EU/DL (ref 0.2–1)
VLDLC SERPL CALC-MCNC: 19.8 MG/DL
WBC # BLD AUTO: 6.5 K/UL (ref 4.6–13.2)

## 2021-05-14 PROCEDURE — 85025 COMPLETE CBC W/AUTO DIFF WBC: CPT

## 2021-05-14 PROCEDURE — 80053 COMPREHEN METABOLIC PANEL: CPT

## 2021-05-14 PROCEDURE — 80061 LIPID PANEL: CPT

## 2021-05-14 PROCEDURE — 36415 COLL VENOUS BLD VENIPUNCTURE: CPT

## 2021-05-14 PROCEDURE — 84439 ASSAY OF FREE THYROXINE: CPT

## 2021-05-14 PROCEDURE — 84443 ASSAY THYROID STIM HORMONE: CPT

## 2021-05-14 PROCEDURE — 83036 HEMOGLOBIN GLYCOSYLATED A1C: CPT

## 2021-05-14 PROCEDURE — 81003 URINALYSIS AUTO W/O SCOPE: CPT

## 2021-05-19 NOTE — PROGRESS NOTES
Patient's recent A1c was 5.4. Patient is not prediabetic. Total cholesterol 174, triglycerides 99 and LDL high at 107.2. Good cholesterol heart protective 47. TSH and free T4 related to thyroid normal.  Patient's metabolic panel slightly abnormal but unremarkable. Patient CBC, red blood cells increased at 5.71, MCV decreased at 70.2. Patient CBC is similar to other CBCs she has had. I see patient gets active infusions of iron.   Urinalysis was normal.

## 2021-06-02 NOTE — PROGRESS NOTES
Contacted patient and left message to contact the office when available. Awaiting callback. Will advise patient on normal lab results.

## 2021-06-03 NOTE — PROGRESS NOTES
Patient contacted the office and was advised on the normal lab results. Patient verbalized understanding and tolerated well. Encounter closed.

## 2021-12-16 ENCOUNTER — NURSE TRIAGE (OUTPATIENT)
Dept: OTHER | Facility: CLINIC | Age: 46
End: 2021-12-16

## 2021-12-16 NOTE — TELEPHONE ENCOUNTER
Received call from Josiane Flores at [facility] with [employer]. Brief description of triage: common cold sx  Nasal discharge, cough, sore throat, and headache    Triage indicates for patient to see pcp today or tomorrow    Advised to utilized the ED/UCC if no appmt available    Care advice provided, patient verbalizes understanding; denies any other questions or concerns; instructed to call back for any new or worsening symptoms. Writer provided warm transfer to Ethel at [facility] for appointment scheduling. Attention Provider: Thank you for allowing me to participate in the care of your patient. The patient was connected to triage in response to information provided to the [facility]. Please do not respond through this encounter as the response is not directed to a shared pool. Reason for Disposition   Patient wants to be seen    Answer Assessment - Initial Assessment Questions  1. ONSET: \"When did the nasal discharge start? \"       A couple days    2. AMOUNT: \"How much discharge is there? \"       Just in a bit    3. COUGH: \"Do you have a cough? \" If yes, ask: \"Describe the color of your sputum\" (clear, white, yellow, green)      Cough- dry cough    4. RESPIRATORY DISTRESS: \"Describe your breathing. \"       Denies    5. FEVER: \"Do you have a fever? \" If so, ask: \"What is your temperature, how was it measured, and when did it start? \"      Denies    6. SEVERITY: \"Overall, how bad are you feeling right now? \" (e.g., doesn't interfere with normal activities, staying home from school/work, staying in bed)     Missing work    7. OTHER SYMPTOMS: \"Do you have any other symptoms? \" (e.g., sore throat, earache, wheezing, vomiting)      Sore throat, cough, headache to the sides of head, congestion    8. PREGNANCY: \"Is there any chance you are pregnant? \" \"When was your last menstrual period? \"      Denies, hysterectomy    Protocols used: COMMON COLD-ADULT-[place]

## 2021-12-17 NOTE — TELEPHONE ENCOUNTER
Lmovm letting pt know to get tested for covid and to go to the emergency room if she has any breathing problems

## 2022-03-20 PROBLEM — D25.9 FIBROID UTERUS: Status: ACTIVE | Noted: 2020-08-10

## 2022-03-20 PROBLEM — D50.0 ANEMIA DUE TO BLOOD LOSS, CHRONIC: Status: ACTIVE | Noted: 2020-07-23

## 2022-12-15 ENCOUNTER — HOSPITAL ENCOUNTER (OUTPATIENT)
Dept: WOMENS IMAGING | Age: 47
Discharge: HOME | End: 2022-12-15
Attending: NURSE PRACTITIONER
Payer: COMMERCIAL

## 2022-12-15 ENCOUNTER — HOSPITAL ENCOUNTER (OUTPATIENT)
Dept: ULTRASOUND IMAGING | Age: 47
End: 2022-12-15
Attending: OBSTETRICS & GYNECOLOGY
Payer: COMMERCIAL

## 2022-12-15 DIAGNOSIS — N63.20 LEFT BREAST MASS: ICD-10-CM

## 2022-12-15 PROCEDURE — 77066 DX MAMMO INCL CAD BI: CPT

## 2022-12-15 PROCEDURE — 76642 ULTRASOUND BREAST LIMITED: CPT

## 2023-01-12 ENCOUNTER — HOSPITAL ENCOUNTER (OUTPATIENT)
Dept: ULTRASOUND IMAGING | Age: 48
Discharge: HOME | End: 2023-01-12
Attending: NURSE PRACTITIONER
Payer: COMMERCIAL

## 2023-01-12 ENCOUNTER — HOSPITAL ENCOUNTER (OUTPATIENT)
Dept: WOMENS IMAGING | Age: 48
End: 2023-01-12
Attending: NURSE PRACTITIONER
Payer: COMMERCIAL

## 2023-01-12 DIAGNOSIS — R92.8 ABNORMAL FINDING ON BREAST IMAGING: ICD-10-CM

## 2023-01-12 PROCEDURE — 19083 BX BREAST 1ST LESION US IMAG: CPT

## 2023-01-12 PROCEDURE — 88360 TUMOR IMMUNOHISTOCHEM/MANUAL: CPT

## 2023-01-12 PROCEDURE — 74011000250 HC RX REV CODE- 250: Performed by: NURSE PRACTITIONER

## 2023-01-12 PROCEDURE — 77061 BREAST TOMOSYNTHESIS UNI: CPT

## 2023-01-12 PROCEDURE — 88305 TISSUE EXAM BY PATHOLOGIST: CPT

## 2023-01-12 RX ORDER — LIDOCAINE HYDROCHLORIDE AND EPINEPHRINE 10; 10 MG/ML; UG/ML
20 INJECTION, SOLUTION INFILTRATION; PERINEURAL
Status: COMPLETED | OUTPATIENT
Start: 2023-01-12 | End: 2023-01-12

## 2023-01-12 RX ORDER — LIDOCAINE HYDROCHLORIDE 10 MG/ML
5 INJECTION, SOLUTION EPIDURAL; INFILTRATION; INTRACAUDAL; PERINEURAL
Status: COMPLETED | OUTPATIENT
Start: 2023-01-12 | End: 2023-01-12

## 2023-01-12 RX ADMIN — LIDOCAINE HYDROCHLORIDE 5 ML: 10 INJECTION, SOLUTION EPIDURAL; INFILTRATION; INTRACAUDAL; PERINEURAL at 13:19

## 2023-01-12 RX ADMIN — LIDOCAINE HYDROCHLORIDE AND EPINEPHRINE 20 ML: 10; 10 INJECTION, SOLUTION INFILTRATION; PERINEURAL at 13:19

## 2023-01-12 NOTE — PROGRESS NOTES
Patient arrived for Ultrasound Guided RIGHT Breast Biopsy with Clip Placement. Patient arrived in [facility] ambulatory, alert and oriented. Patient tolerated Biopsy well without complaint. Specimens were obtained and placed in labeled formalin container for pathology. Direct pressure was applied to incision site, bleeding controlled, and steri strips were applied prior to post mammogram images for confirmation of clip placement. Clean dry guaze and ice pack applied and bra on. Reviewed post breast biopsy instructions with patient. Patient verbalizes understanding and written copy given to patient.   Patient was discharged at 2:05pm.

## 2023-01-25 ENCOUNTER — OFFICE VISIT (OUTPATIENT)
Dept: SURGERY | Age: 48
End: 2023-01-25
Payer: COMMERCIAL

## 2023-01-25 VITALS
SYSTOLIC BLOOD PRESSURE: 114 MMHG | OXYGEN SATURATION: 98 % | RESPIRATION RATE: 18 BRPM | HEIGHT: 63 IN | DIASTOLIC BLOOD PRESSURE: 76 MMHG | TEMPERATURE: 98 F | WEIGHT: 157 LBS | HEART RATE: 76 BPM | BODY MASS INDEX: 27.82 KG/M2

## 2023-01-25 DIAGNOSIS — D05.11 DUCTAL CARCINOMA IN SITU (DCIS) OF RIGHT BREAST: Primary | ICD-10-CM

## 2023-01-25 DIAGNOSIS — N63.25 MASS OVERLAPPING MULTIPLE QUADRANTS OF LEFT BREAST: ICD-10-CM

## 2023-01-25 PROCEDURE — 99205 OFFICE O/P NEW HI 60 MIN: CPT | Performed by: SURGERY

## 2023-01-25 NOTE — LETTER
1/25/2023    Patient: Elliott Sultana   YOB: 1975   Date of Visit: 1/25/2023     Cristy Payne NP  [id number] [place] [id number]  [address] [place]    Dear Cristy Payne NP,      Thank you for referring Ms. Jovana Mcgee to [address] [address] [facility] for evaluation. My notes for this consultation are attached. If you have questions, please do not hesitate to call me. I look forward to following your patient along with you.       Sincerely,    Tamica Lazcano, [address]

## 2023-01-25 NOTE — PROGRESS NOTES
Breast Cancer Consult      Ms. 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 The area of concern was identified on screening mammogram.  She has a palpable mass on her left breast for many years that has bene painful but nothing on her right. She has no nipple discharge. She has no breast pain. There is family history of breast cancer in a maternal aunt. There is no family history of pancreatic, ovarian, or colon cancer. Her most recent previous mammogram was 2017 was BIRADS 4 for spiculated mass right breast at 12 oclock and large left breast mass. Pathology report from both biopsies were benign demonstrating sclerosis adenosis. The left breast has continues to enlarge over the years and is very painful with caffeine or chocolate intake. Menarche age 10. She has one daughter age 15 whom she did not breast fed. She underwent total abdominal hysterectomy in 2020 for dysfunctional uterine bleeding secondary to large fibroids. She has never been on hormone replacement therapy. She is originally from [place]. Currently working at [employer]. Breast/GYN history:    OB History          1    Para   1    Term   1            AB        Living   1         SAB        IAB        Ectopic        Molar        Multiple        Live Births   1                 Past Medical History:   Diagnosis Date    Anemia NEC as a child    iron deficiency     Breast lump 2017    Left Breast Lump    Fibroid        Past Surgical History:   Procedure Laterality Date    HX  SECTION      HX GYN  2006    fibroids    AR UNLISTED PROCEDURE BREAST      right breast biopsy       Current Outpatient Medications on File Prior to Visit   Medication Sig Dispense Refill    acetaminophen (TYLENOL) 500 mg tablet Take 500 mg by mouth every six (6) hours as needed for Pain.        No current facility-administered medications on file prior to visit. No Known Allergies    Social History     Tobacco Use    Smoking status: Former     Packs/day: 0.09     Types: Cigarettes, Cigars     Quit date: 7/1/2007     Years since quitting: 15.5    Smokeless tobacco: Never    Tobacco comments:     smokes when stressed; no smoking within 24 hours of dos   Substance Use Topics    Alcohol use: Yes     Alcohol/week: 0.8 standard drinks     Types: 1 Standard drinks or equivalent per week     Comment: rare    Drug use: No       Family History   Problem Relation Age of Onset    Hypertension Mother     No Known Problems Father     Cancer Maternal Aunt         Breast         ROS:   Review of Systems   Constitutional:  Negative for fatigue, fever and unexpected weight change. Respiratory:  Negative for chest tightness and shortness of breath. Cardiovascular:  Negative for chest pain and palpitations. Skin:  Negative for pallor and rash. Hematological:  Negative for adenopathy. Does not bruise/bleed easily. Physical Exam:  Visit Vitals  /76   Pulse 76   Temp 98 °F (36.7 °C)   Resp 18   Ht 5' 3\" (1.6 m)   Wt 71.2 kg (157 lb)   LMP 08/08/2020 (Approximate)   SpO2 98%   BMI 27.81 kg/m²   BMI: [unfilled]    Physical Exam  Constitutional:       Appearance: Normal appearance. She is normal weight. Cardiovascular:      Rate and Rhythm: Normal rate. Pulmonary:      Effort: Pulmonary effort is normal.   Chest:   Breasts:     Right: Mass present. No swelling, bleeding, inverted nipple, nipple discharge, skin change or tenderness. Left: Mass present. No swelling, bleeding, inverted nipple, nipple discharge, skin change or tenderness. Lymphadenopathy:      Upper Body:      Right upper body: No supraclavicular, axillary or pectoral adenopathy. Left upper body: No supraclavicular, axillary or pectoral adenopathy. Skin:     General: Skin is warm and dry. Neurological:      General: No focal deficit present.       Mental Status: She is alert and oriented to person, place, and time. Mental status is at baseline. Psychiatric:         Mood and Affect: Mood normal.         Behavior: Behavior normal.         Thought Content: Thought content normal.         Judgment: Judgment normal.       Imaging:  Narrative & Impression   DIGITAL BILATERAL DIAGNOSTIC MAMMOGRAM with CAD  WITH DIGITAL BREAST  TOMOSYNTHESIS 2D/3D EXAM AND BILATERAL BREAST  ULTRASOUND LIMITED     HISTORY: Patient with left breast mass. Prior bilateral breast biopsies  demonstrating sclerosing adenosis with associated microcalcifications in the  right breast and sclerosing adenosis with associated microcalcifications and  extensive stromal fibrosis in the left breast.     COMPARISON: 2013, 2017. DIAGNOSTIC DIGITAL MAMMOGRAM FINDINGS:     Technique: Digital views of bilateral breasts were obtained in the 2D/3D  projections  with computer assisted detection, iCAD Second Look 7.2- H. FINDINGS:     In the left upper outer quadrant, there is a large oval circumscribed mass with  associated biopsy clips. This overall appears similar compared to prior 2017  exam however has increased in size compared to prior 2013 exam.     There is a biopsy clip in the right breast. Adjacent to the biopsy clip in the  right upper outer quadrant, there is a mass with associated architectural  distortion. TARGETED BILATERAL BREAST ULTRASOUND FINDINGS:     Targeted ultrasound the left breast was performed. Sonographic imaging  demonstrates a large oval circumscribed hypoechoic mass the 12:00 retroareolar  region extending 13 cm from the nipple. This measures approximately 11.7 x 11.7  x 3.1 cm. Targeted ultrasound of the right breast demonstrates a small irregular  hypoechoic mass with indistinct margins at the 1:00 position, 6 cm from the  nipple measuring 1.4 x 1.2 x 0.9 cm. A BB was placed on this lesion and  tangential mammographic views were obtained.  This corresponds the central area  of spiculation seen mammographically. IMPRESSION  1. Mass in the right breast the 1:00 position, 6 cm from the nipple is issues. Recommend ultrasound-guided biopsy. 2. Left breast mass overall appears unchanged compared 2017. A clip is present  demonstrating that this was previously biopsied. There is a large size of this  breast mass, patient was offered an option of a breast consult for possible  excision. BIRADS 4: Suspicious abnormality, biopsy should be considered     Breast Density C: The breasts are heterogeneously dense, which may obscure small  masses. Pathology:  ===================================================================                                    * * *FINAL DIAGNOSIS* * *       RIGHT BREAST MASS, 1:00, 6 CM FROM NIPPLE, CORE NEEDLE BIOPSY        DUCTAL CARCINOMA IN SITU, SOLID, CRIBRIFORM, AND APOCRINE SUBTYPES,        NUCLEAR GRADE 2-3 WITHOUT NECROSIS. ESTROGEN RECEPTOR AND PROGESTERONE RECEPTOR STUDIES PENDING. GROSS DESCRIPTION:   The specimen is received in formalin labeled with the patient's   identifiers and \"right breast mass 1:00 6cm from nipple\". The specimen   consists of multiple fibrofatty breast cores and fragments aggregating to   2.5 x 2.5 x 0.3 cm. The specimen is entirely submitted in two cassettes. Breast fixation times   Out of body time and date: 1:28 PM 1/12/23   Timing date placed in formalin: 1:33 PM 1/12/23   Timing date placed on processor: 11:00 AM 1/13/23     TS 1/13/2023 08:32 AM     DAC/ts1       * * *MICROSCOPIC DESCRIPTION:* * *     Representative sections through the \"right breast mass 1:00 6 cm from   nipple\" show multiple elongate fragments of breast parenchyma and stroma. The breast shows areas of stromal fibrosis with a focal area of nodular   adenosis. This area of adenosis shows involvement by ductal carcinoma in   situ showing solid, cribriform, and apocrine subtypes.   The DCIS shows   nuclear grade 23 features. No comedo necrosis is identified. Estrogen   receptor and progesterone receptor immunohistochemistry is pending and   results will be issued as an addendum. A focal area of intraductal   papilloma is also noticed. * * *Addendum (HR)* * *          Addendum Diagnosis   ER/NC Results:   Estrogen Receptor (ER):     Positive (20% of cells stain with strong   intensity). Progesterone Receptor (PgR): Positive (20% of cells stain with strong   intensity). Methods:   Block A2: 3 single stain IHC slides for morphometric analysis   (semi-quantitative & quantitative reporting). Fixative: 10% neutral buffered formalin. Estrogen Receptor: FDA cleared Roche/[employer]land CONFIRM [id number]. Progesterone Receptor: FDA cleared Roche/Inland CONFIRM IE2   Cold Ischemia and Fixation Times: Meets requirements specified in the   latest version of the ASCO/CAP guidelines. Impression:    ICD-10-CM ICD-9-CM    1. Ductal carcinoma in situ (DCIS) of right breast  D05.11 233.0 REFERRAL TO ONCOLOGY      REFERRAL TO RADIATION ONCOLOGY      REFERRAL TO PLASTIC SURGERY      SCHEDULE SURGERY      CBC WITH AUTOMATED DIFF      METABOLIC PANEL, COMPREHENSIVE      EKG, 12 LEAD, INITIAL      2. Mass overlapping multiple quadrants of left breast  N63.25 611.72           We discussed the ways in which DCIS differs from invasive carcinoma and my recommendations for surgery on the left breast mass as well. We discussed that there are many options for treatment of breast cancer. Surgery, chemotherapy, radiation and hormone therapy are all tools that may be used in the treatment of breast cancer. For each patient, we determine which will be most beneficial based on her individual set of circumstances. For some patients all four treatment categories will be recommended. For others one or more of these options are not appropriate. We began by reviewing her imaging thus far as well as pathology in detail. Chemotherapy was addressed first.  Chemotherapy is systemic treatment aimed largely to decrease chance of spread or recurrence of cancer. It is not needed for DCIS. Regarding surgery, there are two main options, lumpectomy or mastectomy. We discussed both in detail. Overall survival and distant recurrence rates are the same. The decision is generally a personal decision more so than a medical one. Lumpectomy is also known as breast conservation surgery or partial mastectomy. The goal is to remove the area of concern as well as surrounding area of uninvolved tissue (\"clear margins\"). Radiation is almost always recommended with lumpectomy to allow for acceptable local recurrence rates. Local recurrence rates are approximately 6% after lumpectomy with radiation. Risks, benefits and options were discussed in detail to include, but not limited to, bleeding, infection, risks of anesthesia, injury to surrounding structures and other unforeseen events such as stroke, heart attack or death. Mastectomy was then addressed. With mastectomy, almost all of the breast tissue is removed. We are not able to remove 100% of the breast tissue. The risk of local recurrence is approximately 2-4% after mastectomy. The overlying skin is generally numb. Most often, the numbness is permanent. Mastectomy can be performed with or without reconstruction. The reconstruction is performed by the plastic surgeon. Commonly it is a multi-step process with placement of tissue expanders as the first step. If she is interested in reconstruction, I will refer her to plastic surgery. Often we are able to perform a nipple sparing mastectomy with reconstruction. The reconstructed breast differs in many ways from the native breast.  The goal is that in a bra or clothing, no one can tell she has had a mastectomy. Radiation generally is not needed after mastectomy.   There are some circumstances, usually based on size, margins, local extension or lymph node status, where post-mastectomy radiation is recommended. Risks, benefits and options were discussed in detail to include, but not limited to, bleeding, infection, risks of anesthesia, injury to surrounding structures and other unforeseen events such as stroke, heart attack or death. Routine screening mammogram is not recommended after mastectomy. Mastectomy is generally not needed for DCIS. We can get plastics involved to help with resulting deformity that would result from removal of the left breast mass which she agrees with. We discussed radiation. Radiation is a local therapy aimed to decrease the chance of local recurrence. It is administered under the direction of a radiation oncologist.  Radiation is almost always recommended with lumpectomy. It generally is not needed after mastectomy. There are some circumstances, usually based on size, margins, local extension or lymph node status, where post-mastectomy radiation is recommended. Most commonly it is administered five days a week for up to seven weeks. Most of the side effects, with the exception of fatigue, are local.    For women with implants, the risk of contracture and other complication may be higher with radiation therapy. Anti-hormone or hormone blocking therapy is used in hormone sensitive, estrogen receptor positive breast cancer. It is generally recommended for 5-10 years. There are two categories of hormone blocking medications. Tamoxifen is a selective estrogen reuptake modulator (SERM). There are several aromatase inhibitors. These medications are generally prescribed by a medical oncologist.        After discussing the above, Ms. Nancy Zimmerman prefers right breast localized lumpectomy, excision of left breast mass with reconstruction with plastics. We will schedule surgery, referrals to radiation and oncology. Preop labs, EKG, CXR have been ordered.   All questions were answered. She was asked to call with any additional questions or concerns.

## 2023-01-26 ENCOUNTER — DOCUMENTATION ONLY (OUTPATIENT)
Dept: SURGERY | Age: 48
End: 2023-01-26

## 2023-01-31 NOTE — PROGRESS NOTES
Megha Brunner is a 41 y.o. female  established patient, here for evaluation of the following chief complaint(s):  Chief Complaint   Patient presents with    Rash    Follow-up        Assessment and Plan  1. Ductal carcinoma in situ (DCIS) of right breast  2. Screen for colon cancer  -     COLOGUARD TEST (FECAL DNA COLORECTAL CANCER SCREENING)  3. Irritant contact dermatitis due to other chemical products  -     triamcinolone acetonide (KENALOG) 0.1 % ointment; Apply  to affected area two (2) times a day. use thin layer, Normal, Disp-80 g, R-1  -     diphenhydrAMINE-zinc acetate (Benadryl Itch Cooling) 2-0.1 % spra; Apply to affected area twice daily, Normal, Disp-59 mL, R-5     Follow-up and Dispositions    Return in about 3 months (around 5/1/2023), or if symptoms worsen or fail to improve, for routine, 20. HPI:   In office visit. Patient says she is well. Patient works at [employer] and she \"caught a rash\" on her right forearm. Her right forearm comes in contact with cardboard chronically. Patient is now wearing company provided forearm sleeves so her skin does not come in contact with cardboard material.    Patient found to have left breast mass on mammogram and ductal carcinoma in situ (DCIS) of right breast She is scheduled for surgery on both breast.     ROS:    General: negative for - chills, fever, weight changes or malaise  HEENT: no sore throat, nasal congestion, vision problems or ear problems  Respiratory: no cough, shortness of breath, or wheezing  Cardiovascular: no chest pain, palpitations, or dyspnea on exertion  Gastrointestinal: no abdominal pain, N/V, change in bowel habits  Musculoskeletal: no back pain or joint pain  Neurological: no headache or dizziness  Endo:  No polyuria or polydipsia  : no urinary  Skin: Rash right forearm  Psychological: negative for - anxiety, depression, sleeps issues    Prior to Admission medications    Medication Sig Start Date End Date Taking?  Authorizing Provider   triamcinolone acetonide (KENALOG) 0.1 % ointment Apply  to affected area two (2) times a day. use thin layer 2/1/23  Yes Ann Yates, NP   diphenhydrAMINE-zinc acetate (Benadryl Itch Cooling) 2-0.1 % spra Apply to affected area twice daily 2/1/23  Yes Yanet Yates NP   hydrocortisone (HYTONE) 2.5 % ointment APPLY TO AFFECTED AREA 2 TIMES A DAY 12/27/22 2/1/23  Provider, Historical   acetaminophen (TYLENOL) 500 mg tablet Take 500 mg by mouth every six (6) hours as needed for Pain. Provider, Historical        Results for orders placed or performed during the hospital encounter of 05/14/21   LIPID PANEL   Result Value Ref Range    LIPID PROFILE          Cholesterol, total 174 <200 MG/DL    Triglyceride 99 <150 MG/DL    HDL Cholesterol 47 40 - 60 MG/DL    LDL, calculated 107.2 (H) 0 - 100 MG/DL    VLDL, calculated 19.8 MG/DL    CHOL/HDL Ratio 3.7 0 - 5.0     T4, FREE   Result Value Ref Range    T4, Free 0.9 0.7 - 1.5 NG/DL   TSH 3RD GENERATION   Result Value Ref Range    TSH 2.22 0.36 - 3.74 uIU/mL   HEMOGLOBIN A1C WITH EAG   Result Value Ref Range    Hemoglobin A1c 5.4 4.2 - 5.6 %    Est. average glucose 108 mg/dL   CBC WITH AUTOMATED DIFF   Result Value Ref Range    WBC 6.5 4.6 - 13.2 K/uL    RBC 5.71 (H) 4.20 - 5.30 M/uL    HGB 12.8 12.0 - 16.0 g/dL    HCT 41.2 35.0 - 45.0 %    MCV 72.2 (L) 74.0 - 97.0 FL    MCH 22.4 (L) 24.0 - 34.0 PG    MCHC 31.1 31.0 - 37.0 g/dL    RDW 17.2 (H) 11.6 - 14.5 %    PLATELET 357 916 - 106 K/uL    NEUTROPHILS 54 40 - 73 %    LYMPHOCYTES 33 21 - 52 %    MONOCYTES 10 3 - 10 %    EOSINOPHILS 1 0 - 5 %    BASOPHILS 0 0 - 2 %    ABS. NEUTROPHILS 3.5 1.8 - 8.0 K/UL    ABS. LYMPHOCYTES 2.2 0.9 - 3.6 K/UL    ABS. MONOCYTES 0.7 0.05 - 1.2 K/UL    ABS. EOSINOPHILS 0.1 0.0 - 0.4 K/UL    ABS.  BASOPHILS 0.0 0.0 - 0.1 K/UL    DF AUTOMATED     METABOLIC PANEL, COMPREHENSIVE   Result Value Ref Range    Sodium 140 136 - 145 mmol/L    Potassium 4.0 3.5 - 5.5 mmol/L    Chloride 106 100 - 111 mmol/L    CO2 28 21 - 32 mmol/L    Anion gap 6 3.0 - 18 mmol/L    Glucose 74 74 - 99 mg/dL    BUN 6 (L) 7.0 - 18 MG/DL    Creatinine 0.55 (L) 0.6 - 1.3 MG/DL    BUN/Creatinine ratio 11 (L) 12 - 20      GFR est AA >60 >60 ml/min/1.73m2    GFR est non-AA >60 >60 ml/min/1.73m2    Calcium 8.6 8.5 - 10.1 MG/DL    Bilirubin, total 0.2 0.2 - 1.0 MG/DL    ALT (SGPT) 17 13 - 56 U/L    AST (SGOT) 9 (L) 10 - 38 U/L    Alk. phosphatase 64 45 - 117 U/L    Protein, total 6.8 6.4 - 8.2 g/dL    Albumin 3.4 3.4 - 5.0 g/dL    Globulin 3.4 2.0 - 4.0 g/dL    A-G Ratio 1.0 0.8 - 1.7     URINALYSIS W/ RFLX MICROSCOPIC   Result Value Ref Range    Color YELLOW      Appearance CLEAR      Specific gravity 1.015 1.005 - 1.030      pH (UA) 7.0 5.0 - 8.0      Protein Negative NEG mg/dL    Glucose Negative NEG mg/dL    Ketone Negative NEG mg/dL    Bilirubin Negative NEG      Blood Negative NEG      Urobilinogen 1.0 0.2 - 1.0 EU/dL    Nitrites Negative NEG      Leukocyte Esterase Negative NEG          Physical Exam  Patient appears well, she is pleasant, alert, oriented x 3, in no distress. ENT normal.  Neck supple. No adenopathy or thyromegaly. DAINA. Lungs are clear, good air entry, no wheezes  Cardiovascular, S1 and S2 normal, no murmurs, regular rate and rhythm. Chest wall negative for tenderness  Abdomen is soft without tenderness, guarding  /Anorectal, deferred. Muscleskeletal, no swelling, no tenderness, no injury. Extremities show no edema  Neurological is normal without focal findings. Skin: no concerning lesions. Psych: normal affect. Mood good. Oriented x 3. Vitals:    02/01/23 1514   BP: 123/80   Pulse: 70   Resp: 16   Temp: 97.7 °F (36.5 °C)   TempSrc: Temporal   SpO2: 100%   Weight: 158 lb 6.4 oz (71.8 kg)   Height: 5' 3\" (1.6 m)   LMP: 08/08/2020       *Plan of care reviewed with patient. Patient in agreement with plan and expresses understanding.  All questions answered and patient encouraged to call or RTO if further questions or concerns.            Braden Garcia, CY-C

## 2023-02-01 ENCOUNTER — OFFICE VISIT (OUTPATIENT)
Dept: FAMILY MEDICINE CLINIC | Age: 48
End: 2023-02-01
Payer: COMMERCIAL

## 2023-02-01 VITALS
BODY MASS INDEX: 28.07 KG/M2 | WEIGHT: 158.4 LBS | RESPIRATION RATE: 16 BRPM | HEART RATE: 70 BPM | DIASTOLIC BLOOD PRESSURE: 80 MMHG | HEIGHT: 63 IN | TEMPERATURE: 97.7 F | OXYGEN SATURATION: 100 % | SYSTOLIC BLOOD PRESSURE: 123 MMHG

## 2023-02-01 DIAGNOSIS — L24.5 IRRITANT CONTACT DERMATITIS DUE TO OTHER CHEMICAL PRODUCTS: ICD-10-CM

## 2023-02-01 DIAGNOSIS — Z12.11 SCREEN FOR COLON CANCER: ICD-10-CM

## 2023-02-01 DIAGNOSIS — D05.11 DUCTAL CARCINOMA IN SITU (DCIS) OF RIGHT BREAST: Primary | ICD-10-CM

## 2023-02-01 PROCEDURE — 99214 OFFICE O/P EST MOD 30 MIN: CPT | Performed by: NURSE PRACTITIONER

## 2023-02-01 RX ORDER — TRIAMCINOLONE ACETONIDE 1 MG/G
OINTMENT TOPICAL 2 TIMES DAILY
Qty: 80 G | Refills: 1 | Status: SHIPPED | OUTPATIENT
Start: 2023-02-01

## 2023-02-01 RX ORDER — HYDROCORTISONE 25 MG/G
OINTMENT TOPICAL
COMMUNITY
Start: 2022-12-27 | End: 2023-02-01

## 2023-02-01 RX ORDER — PRAMOXINE HYDROCHLORIDE 10 MG/ML
LOTION TOPICAL 3 TIMES DAILY
Qty: 222 ML | Refills: 1 | Status: SHIPPED | OUTPATIENT
Start: 2023-02-01

## 2023-02-01 RX ORDER — DIPHENHYDRAMINE HCL 2 %
CREAM (GRAM) TOPICAL
Qty: 59 ML | Refills: 5 | Status: SHIPPED | OUTPATIENT
Start: 2023-02-01

## 2023-02-01 NOTE — PROGRESS NOTES
Addended by: ROXANA HAYS on: 4/14/2020 12:48 PM     Modules accepted: Level of Service     Alex Zelaya is a 41 y.o. presents today for No chief complaint on file. Is someone accompanying this pt? No    Is the patient using any DME equipment during OV? No    There were no vitals taken for this visit. Depression Screening:   3 most recent PHQ Screens 2/1/2023   Little interest or pleasure in doing things Not at all   Feeling down, depressed, irritable, or hopeless Not at all   Total Score PHQ 2 0       Health Maintenance: reviewed and discussed and ordered per Provider. Health Maintenance Due   Topic Date Due    COVID-19 Vaccine (1) Never done    Depression Screen  05/10/2022    Colorectal Cancer Screening Combo  Never done    Flu Vaccine (1) 08/01/2022         Coordination of Care:   1. \"Have you been to the ER, urgent care clinic since your last visit? Hospitalized since your last visit? \" Yes 12/27/2022    2. \"Have you seen or consulted any other health care providers outside of the [address] [place] [address] since your last visit? \" No     3. For patients aged 39-70: Has the patient had a colonoscopy / FIT/ Cologuard? Yes - no Care Gap present    If the patient is female:    4. For patients aged 41-77: Has the patient had a mammogram within the past 2 years? Yes - no Care Gap present    5. For patients aged 21-65: Has the patient had a pap smear? Yes - no Care Gap present     Advanced Directive:  1. Do you have an Advanced Directive? No     2. Would you like information on Advanced Directives?  No    Hever Alston CMA

## 2023-02-23 ENCOUNTER — HOSPITAL ENCOUNTER (OUTPATIENT)
Facility: HOSPITAL | Age: 48
Discharge: HOME | End: 2023-02-23
Payer: COMMERCIAL

## 2023-02-23 ENCOUNTER — CLINICAL DOCUMENTATION (OUTPATIENT)
Age: 48
End: 2023-02-23

## 2023-02-23 DIAGNOSIS — D05.11 DUCTAL CARCINOMA IN SITU (DCIS) OF RIGHT BREAST: ICD-10-CM

## 2023-02-23 DIAGNOSIS — R92.8 ABNORMAL MAMMOGRAM: ICD-10-CM

## 2023-02-23 PROCEDURE — 2500000003 HC RX 250 WO HCPCS: Performed by: SURGERY

## 2023-02-23 PROCEDURE — A4648 IMPLANTABLE TISSUE MARKER: HCPCS

## 2023-02-23 RX ORDER — LIDOCAINE HYDROCHLORIDE 10 MG/ML
10 INJECTION, SOLUTION EPIDURAL; INFILTRATION; INTRACAUDAL; PERINEURAL ONCE
Status: COMPLETED | OUTPATIENT
Start: 2023-02-23 | End: 2023-02-23

## 2023-02-23 RX ADMIN — LIDOCAINE HYDROCHLORIDE 10 ML: 10 INJECTION, SOLUTION EPIDURAL; INFILTRATION; INTRACAUDAL; PERINEURAL at 10:30

## 2023-02-23 NOTE — PROGRESS NOTES
[address]  Breast & Colorectal Oncology Nurse Navigator Encounter    Name: Diana Mcintosh  Age: 41 y.o.  : 1975  Diagnosis: RIGHT BREAST DUCTAL CARCINOMA IN SITU    Encounter type:  [x]Initial Navigator Encounter  [x]Patient Initiated  []Navigator Follow-up  []Other:     Narrative:   Met with patient after tag placement today. Patient expressed financial concerns. I gave patient financial resource information as well as my contact information should she have further questions or concerns. I also suggested she reach out to  for assistance. All questions answered.       Interdisciplinary Team:  Surg-Onc: Dr. Jose Luis Lopez right lumpectomy with reconstruction surgery scheduled 2023 at [facility]  Med-Onc: pending  Rad-Onc: pending  TAG- 2023 completed  Plastics: Dr. Jose Qiu  EKG-pending  CXR-pending  LABS-pending  Nurse Navigator: Leanne Summers, RN      Leanne Summers, BSN, RN  Breast & Colorectal Cancer Nurse Navigator    [address]  [address]  Office number [phone]  [email or web address]  Good Help to Those in [facility]

## 2023-02-24 ENCOUNTER — CLINICAL DOCUMENTATION (OUTPATIENT)
Age: 48
End: 2023-02-24

## 2023-02-24 DIAGNOSIS — Z01.818 PRE-OP TESTING: Primary | ICD-10-CM

## 2023-02-24 NOTE — PROGRESS NOTES
[address]  Breast & Colorectal Oncology Nurse Navigator Encounter    Name: Calderon Edwards  Age: 41 y.o.  : 1975    Encounter type:  []Initial Navigator Encounter  []Patient Initiated  [x]Navigator Follow-up  []Other:     Narrative:   Called patient regarding up coming surgery scheduled 2023. Patient have not completed all required pre-op labs, CXR, EKG. Informed patient this need to be done prior to surgery. She states, she will complete everything on Monday. Informed Toni Marcos,  orders are not in new Epic system. She will seek assistance from Jim Alarcon for orders.         Interdisciplinary Team:    Nurse Navigator: JOVANNI Aldana BSN, RN  Breast & Colorectal Cancer Nurse Navigator    [address]  [facility] [address] [address] [place], [address]  Office number [phone]  [email or web address]  Good Help to Those in [facility]

## 2023-02-27 ENCOUNTER — ANESTHESIA EVENT (OUTPATIENT)
Facility: HOSPITAL | Age: 48
End: 2023-02-27
Payer: COMMERCIAL

## 2023-02-27 ENCOUNTER — HOSPITAL ENCOUNTER (OUTPATIENT)
Facility: HOSPITAL | Age: 48
Discharge: HOME | End: 2023-03-02
Payer: COMMERCIAL

## 2023-02-27 DIAGNOSIS — Z01.818 PRE-OP TESTING: ICD-10-CM

## 2023-02-27 LAB
ANION GAP SERPL CALC-SCNC: 5 MMOL/L (ref 3–18)
BASOPHILS # BLD: 0 K/UL (ref 0–0.1)
BASOPHILS NFR BLD: 1 % (ref 0–2)
BUN SERPL-MCNC: 10 MG/DL (ref 7–18)
BUN/CREAT SERPL: 17 (ref 12–20)
CALCIUM SERPL-MCNC: 8.8 MG/DL (ref 8.5–10.1)
CHLORIDE SERPL-SCNC: 108 MMOL/L (ref 100–111)
CO2 SERPL-SCNC: 28 MMOL/L (ref 21–32)
CREAT SERPL-MCNC: 0.6 MG/DL (ref 0.6–1.3)
DIFFERENTIAL METHOD BLD: ABNORMAL
EKG ATRIAL RATE: 68 BPM
EKG DIAGNOSIS: NORMAL
EKG P AXIS: 59 DEGREES
EKG P-R INTERVAL: 148 MS
EKG Q-T INTERVAL: 350 MS
EKG QRS DURATION: 72 MS
EKG QTC CALCULATION (BAZETT): 372 MS
EKG R AXIS: -28 DEGREES
EKG T AXIS: 10 DEGREES
EKG VENTRICULAR RATE: 68 BPM
EOSINOPHIL # BLD: 0.1 K/UL (ref 0–0.4)
EOSINOPHIL NFR BLD: 2 % (ref 0–5)
ERYTHROCYTE [DISTWIDTH] IN BLOOD BY AUTOMATED COUNT: 17.4 % (ref 11.6–14.5)
GLUCOSE SERPL-MCNC: 62 MG/DL (ref 74–99)
HCT VFR BLD AUTO: 39.6 % (ref 35–45)
HGB BLD-MCNC: 12.2 G/DL (ref 12–16)
IMM GRANULOCYTES # BLD AUTO: 0 K/UL (ref 0–0.04)
IMM GRANULOCYTES NFR BLD AUTO: 1 % (ref 0–0.5)
LYMPHOCYTES # BLD: 1.8 K/UL (ref 0.9–3.6)
LYMPHOCYTES NFR BLD: 29 % (ref 21–52)
MCH RBC QN AUTO: 22.6 PG (ref 24–34)
MCHC RBC AUTO-ENTMCNC: 30.8 G/DL (ref 31–37)
MCV RBC AUTO: 73.2 FL (ref 78–100)
MONOCYTES # BLD: 0.5 K/UL (ref 0.05–1.2)
MONOCYTES NFR BLD: 9 % (ref 3–10)
NEUTS SEG # BLD: 3.7 K/UL (ref 1.8–8)
NEUTS SEG NFR BLD: 60 % (ref 40–73)
NRBC # BLD: 0 K/UL (ref 0–0.01)
NRBC BLD-RTO: 0 PER 100 WBC
PLATELET # BLD AUTO: 258 K/UL (ref 135–420)
POTASSIUM SERPL-SCNC: 4.3 MMOL/L (ref 3.5–5.5)
RBC # BLD AUTO: 5.41 M/UL (ref 4.2–5.3)
SODIUM SERPL-SCNC: 141 MMOL/L (ref 136–145)
WBC # BLD AUTO: 6.1 K/UL (ref 4.6–13.2)

## 2023-02-27 PROCEDURE — 71046 X-RAY EXAM CHEST 2 VIEWS: CPT

## 2023-02-27 PROCEDURE — 85025 COMPLETE CBC W/AUTO DIFF WBC: CPT

## 2023-02-27 PROCEDURE — 80048 BASIC METABOLIC PNL TOTAL CA: CPT

## 2023-02-27 PROCEDURE — 93005 ELECTROCARDIOGRAM TRACING: CPT | Performed by: SURGERY

## 2023-02-27 PROCEDURE — 36415 COLL VENOUS BLD VENIPUNCTURE: CPT

## 2023-02-28 ENCOUNTER — ANESTHESIA (OUTPATIENT)
Facility: HOSPITAL | Age: 48
End: 2023-02-28
Payer: COMMERCIAL

## 2023-02-28 ENCOUNTER — HOSPITAL ENCOUNTER (OUTPATIENT)
Facility: HOSPITAL | Age: 48
Setting detail: OUTPATIENT SURGERY
Discharge: HOME | End: 2023-02-28
Attending: SURGERY | Admitting: SURGERY
Payer: COMMERCIAL

## 2023-02-28 ENCOUNTER — APPOINTMENT (OUTPATIENT)
Facility: HOSPITAL | Age: 48
End: 2023-02-28
Attending: SURGERY
Payer: COMMERCIAL

## 2023-02-28 VITALS
OXYGEN SATURATION: 99 % | HEIGHT: 63 IN | HEART RATE: 89 BPM | SYSTOLIC BLOOD PRESSURE: 118 MMHG | RESPIRATION RATE: 18 BRPM | TEMPERATURE: 97.6 F | DIASTOLIC BLOOD PRESSURE: 70 MMHG | BODY MASS INDEX: 28.03 KG/M2 | WEIGHT: 158.2 LBS

## 2023-02-28 DIAGNOSIS — D05.11 DUCTAL CARCINOMA IN SITU (DCIS) OF RIGHT BREAST: ICD-10-CM

## 2023-02-28 DIAGNOSIS — R92.8 ABNORMAL MAMMOGRAM: ICD-10-CM

## 2023-02-28 PROCEDURE — 3600000012 HC SURGERY LEVEL 2 ADDTL 15MIN: Performed by: SURGERY

## 2023-02-28 PROCEDURE — 88305 TISSUE EXAM BY PATHOLOGIST: CPT

## 2023-02-28 PROCEDURE — 2709999900 HC NON-CHARGEABLE SUPPLY: Performed by: SURGERY

## 2023-02-28 PROCEDURE — 88341 IMHCHEM/IMCYTCHM EA ADD ANTB: CPT

## 2023-02-28 PROCEDURE — 2500000003 HC RX 250 WO HCPCS: Performed by: ANESTHESIOLOGY

## 2023-02-28 PROCEDURE — 6360000002 HC RX W HCPCS: Performed by: SURGERY

## 2023-02-28 PROCEDURE — 2580000003 HC RX 258: Performed by: SURGERY

## 2023-02-28 PROCEDURE — 7100000011 HC PHASE II RECOVERY - ADDTL 15 MIN: Performed by: SURGERY

## 2023-02-28 PROCEDURE — 7100000000 HC PACU RECOVERY - FIRST 15 MIN: Performed by: SURGERY

## 2023-02-28 PROCEDURE — 3700000001 HC ADD 15 MINUTES (ANESTHESIA): Performed by: SURGERY

## 2023-02-28 PROCEDURE — 3600000002 HC SURGERY LEVEL 2 BASE: Performed by: SURGERY

## 2023-02-28 PROCEDURE — 6360000002 HC RX W HCPCS: Performed by: ANESTHESIOLOGY

## 2023-02-28 PROCEDURE — 19301 PARTIAL MASTECTOMY: CPT | Performed by: SURGERY

## 2023-02-28 PROCEDURE — 88342 IMHCHEM/IMCYTCHM 1ST ANTB: CPT

## 2023-02-28 PROCEDURE — 7100000001 HC PACU RECOVERY - ADDTL 15 MIN: Performed by: SURGERY

## 2023-02-28 PROCEDURE — 7100000010 HC PHASE II RECOVERY - FIRST 15 MIN: Performed by: SURGERY

## 2023-02-28 PROCEDURE — 88307 TISSUE EXAM BY PATHOLOGIST: CPT

## 2023-02-28 PROCEDURE — 19125 EXCISION BREAST LESION: CPT | Performed by: SURGERY

## 2023-02-28 PROCEDURE — 3700000000 HC ANESTHESIA ATTENDED CARE: Performed by: SURGERY

## 2023-02-28 PROCEDURE — 2720000010 HC SURG SUPPLY STERILE: Performed by: SURGERY

## 2023-02-28 PROCEDURE — 76098 X-RAY EXAM SURGICAL SPECIMEN: CPT

## 2023-02-28 PROCEDURE — 2580000003 HC RX 258: Performed by: NURSE ANESTHETIST, CERTIFIED REGISTERED

## 2023-02-28 PROCEDURE — 2500000003 HC RX 250 WO HCPCS: Performed by: SURGERY

## 2023-02-28 PROCEDURE — 6370000000 HC RX 637 (ALT 250 FOR IP): Performed by: NURSE ANESTHETIST, CERTIFIED REGISTERED

## 2023-02-28 RX ORDER — FAMOTIDINE 20 MG/1
20 TABLET, FILM COATED ORAL ONCE
Status: COMPLETED | OUTPATIENT
Start: 2023-02-28 | End: 2023-02-28

## 2023-02-28 RX ORDER — ONDANSETRON 2 MG/ML
4 INJECTION INTRAMUSCULAR; INTRAVENOUS
Status: DISCONTINUED | OUTPATIENT
Start: 2023-02-28 | End: 2023-02-28 | Stop reason: HOSPADM

## 2023-02-28 RX ORDER — LIDOCAINE HYDROCHLORIDE 10 MG/ML
1 INJECTION, SOLUTION EPIDURAL; INFILTRATION; INTRACAUDAL; PERINEURAL
Status: DISCONTINUED | OUTPATIENT
Start: 2023-02-28 | End: 2023-02-28 | Stop reason: HOSPADM

## 2023-02-28 RX ORDER — PROCHLORPERAZINE EDISYLATE 5 MG/ML
5 INJECTION INTRAMUSCULAR; INTRAVENOUS
Status: DISCONTINUED | OUTPATIENT
Start: 2023-02-28 | End: 2023-02-28 | Stop reason: HOSPADM

## 2023-02-28 RX ORDER — SODIUM CHLORIDE 0.9 % (FLUSH) 0.9 %
5-40 SYRINGE (ML) INJECTION EVERY 12 HOURS SCHEDULED
Status: DISCONTINUED | OUTPATIENT
Start: 2023-02-28 | End: 2023-02-28 | Stop reason: HOSPADM

## 2023-02-28 RX ORDER — HYDROCODONE BITARTRATE AND ACETAMINOPHEN 5; 325 MG/1; MG/1
1 TABLET ORAL EVERY 6 HOURS PRN
Status: CANCELLED | OUTPATIENT
Start: 2023-02-28

## 2023-02-28 RX ORDER — DEXAMETHASONE SODIUM PHOSPHATE 4 MG/ML
INJECTION, SOLUTION INTRA-ARTICULAR; INTRALESIONAL; INTRAMUSCULAR; INTRAVENOUS; SOFT TISSUE PRN
Status: DISCONTINUED | OUTPATIENT
Start: 2023-02-28 | End: 2023-02-28 | Stop reason: SDUPTHER

## 2023-02-28 RX ORDER — FENTANYL CITRATE 50 UG/ML
25 INJECTION, SOLUTION INTRAMUSCULAR; INTRAVENOUS EVERY 5 MIN PRN
Status: DISCONTINUED | OUTPATIENT
Start: 2023-03-01 | End: 2023-02-28 | Stop reason: HOSPADM

## 2023-02-28 RX ORDER — MIDAZOLAM HYDROCHLORIDE 1 MG/ML
INJECTION INTRAMUSCULAR; INTRAVENOUS PRN
Status: DISCONTINUED | OUTPATIENT
Start: 2023-02-28 | End: 2023-02-28 | Stop reason: SDUPTHER

## 2023-02-28 RX ORDER — SODIUM CHLORIDE, SODIUM LACTATE, POTASSIUM CHLORIDE, CALCIUM CHLORIDE 600; 310; 30; 20 MG/100ML; MG/100ML; MG/100ML; MG/100ML
INJECTION, SOLUTION INTRAVENOUS CONTINUOUS
Status: DISCONTINUED | OUTPATIENT
Start: 2023-02-28 | End: 2023-02-28 | Stop reason: HOSPADM

## 2023-02-28 RX ORDER — PROPOFOL 10 MG/ML
INJECTION, EMULSION INTRAVENOUS PRN
Status: DISCONTINUED | OUTPATIENT
Start: 2023-02-28 | End: 2023-02-28 | Stop reason: SDUPTHER

## 2023-02-28 RX ORDER — SODIUM CHLORIDE 0.9 % (FLUSH) 0.9 %
5-40 SYRINGE (ML) INJECTION PRN
Status: DISCONTINUED | OUTPATIENT
Start: 2023-02-28 | End: 2023-02-28 | Stop reason: HOSPADM

## 2023-02-28 RX ORDER — FENTANYL CITRATE 50 UG/ML
INJECTION, SOLUTION INTRAMUSCULAR; INTRAVENOUS PRN
Status: DISCONTINUED | OUTPATIENT
Start: 2023-02-28 | End: 2023-02-28 | Stop reason: SDUPTHER

## 2023-02-28 RX ORDER — HYDROMORPHONE HYDROCHLORIDE 1 MG/ML
0.5 INJECTION, SOLUTION INTRAMUSCULAR; INTRAVENOUS; SUBCUTANEOUS EVERY 10 MIN PRN
Status: DISCONTINUED | OUTPATIENT
Start: 2023-02-28 | End: 2023-02-28 | Stop reason: HOSPADM

## 2023-02-28 RX ORDER — EPHEDRINE SULFATE/0.9% NACL/PF 50 MG/5 ML
SYRINGE (ML) INTRAVENOUS PRN
Status: DISCONTINUED | OUTPATIENT
Start: 2023-02-28 | End: 2023-02-28 | Stop reason: SDUPTHER

## 2023-02-28 RX ORDER — SODIUM CHLORIDE 9 MG/ML
INJECTION, SOLUTION INTRAVENOUS PRN
Status: DISCONTINUED | OUTPATIENT
Start: 2023-02-28 | End: 2023-02-28 | Stop reason: HOSPADM

## 2023-02-28 RX ORDER — KETOROLAC TROMETHAMINE 15 MG/ML
INJECTION, SOLUTION INTRAMUSCULAR; INTRAVENOUS PRN
Status: DISCONTINUED | OUTPATIENT
Start: 2023-02-28 | End: 2023-02-28 | Stop reason: SDUPTHER

## 2023-02-28 RX ADMIN — PROPOFOL 150 MG: 10 INJECTION, EMULSION INTRAVENOUS at 07:41

## 2023-02-28 RX ADMIN — FAMOTIDINE 20 MG: 20 TABLET ORAL at 06:43

## 2023-02-28 RX ADMIN — SODIUM CHLORIDE, POTASSIUM CHLORIDE, SODIUM LACTATE AND CALCIUM CHLORIDE: 600; 310; 30; 20 INJECTION, SOLUTION INTRAVENOUS at 06:39

## 2023-02-28 RX ADMIN — Medication 10 MG: at 09:17

## 2023-02-28 RX ADMIN — FENTANYL CITRATE 100 MCG: 50 INJECTION, SOLUTION INTRAMUSCULAR; INTRAVENOUS at 08:17

## 2023-02-28 RX ADMIN — HYDROMORPHONE HYDROCHLORIDE 0.5 MG: 1 INJECTION, SOLUTION INTRAMUSCULAR; INTRAVENOUS; SUBCUTANEOUS at 12:01

## 2023-02-28 RX ADMIN — WATER 2000 MG: 1 INJECTION, SOLUTION INTRAMUSCULAR; INTRAVENOUS; SUBCUTANEOUS at 07:46

## 2023-02-28 RX ADMIN — FENTANYL CITRATE 100 MCG: 50 INJECTION, SOLUTION INTRAMUSCULAR; INTRAVENOUS at 07:41

## 2023-02-28 RX ADMIN — Medication 10 MG: at 09:13

## 2023-02-28 RX ADMIN — KETOROLAC TROMETHAMINE 15 MG: 15 INJECTION, SOLUTION INTRAMUSCULAR; INTRAVENOUS at 07:55

## 2023-02-28 RX ADMIN — DEXAMETHASONE SODIUM PHOSPHATE 4 MG: 4 INJECTION, SOLUTION INTRAMUSCULAR; INTRAVENOUS at 07:55

## 2023-02-28 RX ADMIN — MIDAZOLAM HYDROCHLORIDE 2 MG: 2 INJECTION, SOLUTION INTRAMUSCULAR; INTRAVENOUS at 07:41

## 2023-02-28 ASSESSMENT — PAIN DESCRIPTION - LOCATION: LOCATION: BREAST

## 2023-02-28 ASSESSMENT — PAIN SCALES - GENERAL
PAINLEVEL_OUTOF10: 0
PAINLEVEL_OUTOF10: 5
PAINLEVEL_OUTOF10: 7

## 2023-02-28 ASSESSMENT — PAIN - FUNCTIONAL ASSESSMENT: PAIN_FUNCTIONAL_ASSESSMENT: 0-10

## 2023-02-28 ASSESSMENT — PAIN DESCRIPTION - DESCRIPTORS: DESCRIPTORS: SORE

## 2023-02-28 NOTE — DISCHARGE INSTRUCTIONS
Plastic Surgery (Dr. Keesha Schulte) Discharge Instructions      -keep the surgical bra in place except when washing; keep clear plastic dressing over the drains and the tape/glue over the incisions in place; OK to change the gauze/pads as needed  -OK to sponge bathe, but keep all of the incisions and drain exit sites clean and dry  -do not submerge any incisions under water (e.g., bath, swimming pool, etc.) for at least two weeks  -no strenuous activity or lifting greater than 10 pounds (i.e., no activity that may increase blood pressure) for at least two weeks  -avoid stretching arms over your head or any strenuous or repetitive activity with the arms  -make a follow-up appointment ([phone]) to be seen by Dr. Keesha Schulte in about 1 week  -take pain medication only as prescribed and only if needed  -no driving while on pain medication  -empty and record drain outputs regularly; bring the log of the output amounts with you to the office  -call the office or return to an ER immediately in case of uncontrollable pain or bleeding, persistent fever greater than 101.5 F, uncontrollable drainage from the wound or in case of any other questions/concerns    DISCHARGE SUMMARY from Nurse    PATIENT INSTRUCTIONS:    After general anesthesia or intravenous sedation, for 24 hours or while taking prescription Narcotics:  Limit your activities  Do not drive and operate hazardous machinery  Do not make important personal or business decisions  Do  not drink alcoholic beverages  If you have not urinated within 8 hours after discharge, please contact your surgeon on call.     Report the following to your surgeon:  Excessive pain, swelling, redness or odor of or around the surgical area  Temperature over 100.5  Nausea and vomiting lasting longer than 4 hours or if unable to take medications  Any signs of decreased circulation or nerve impairment to extremity: change in color, persistent  numbness, tingling, coldness or increase pain  Any questions      These are general instructions for a healthy lifestyle:    No smoking/ No tobacco products/ Avoid exposure to second hand smoke  Surgeon General's Warning:  Quitting smoking now greatly reduces serious risk to your health. Obesity, smoking, and sedentary lifestyle greatly increases your risk for illness    A healthy diet, regular physical exercise & weight monitoring are important for maintaining a healthy lifestyle    You may be retaining fluid if you have a history of heart failure or if you experience any of the following symptoms:  Weight gain of 3 pounds or more overnight or 5 pounds in a week, increased swelling in our hands or feet or shortness of breath while lying flat in bed. Please call your doctor as soon as you notice any of these symptoms; do not wait until your next office visit. The discharge information has been reviewed with the patient and sister. The patient and sister verbalized understanding. Discharge medications reviewed with the patient and sister and appropriate educational materials and side effects teaching were provided.   ___________________________________________________________________________________________________________________________________

## 2023-02-28 NOTE — PROGRESS NOTES
conducted a pre-surgery visit with Terese Vieyra, who is a 41 y.o.,female. The  provided the following Interventions:  Initiated a relationship of care and support. Offered prayer and assurance of continued prayers on patient's behalf. There is no advance directive present. Plan:  Chaplains will continue to follow and will provide pastoral care on an as needed/requested basis.  recommends bedside caregivers page  on duty if patient shows signs of acute spiritual or emotional distress.     Reiseñor 3   Board Certified [address] [place] [address]   [phone]

## 2023-02-28 NOTE — OP NOTE
Right breast localized lumpectomy, Left breast excision of mass    Patient Name: Patrice Adams    SURGERY DATE: 23    : 1975    AGE: 41 y.o. Surgeon: Rondel Schirmer, DO    Anesthesiologist: Anesthesiologist: Eliz Gorman MD    Anesthesia: General    Surgical Assist: Circulator: Marily Quintero RN  Surgical Assistant: Vernette Pump  Scrub Person First: Steff Akers    PreOp DX: Ductal carcinoma in situ (DCIS) of right breast [D05.11], left breast mass    PostOp DX: same    Procedure Details: After informed consent was obtained the patient was taken to the operating room and placed in the supine position. General anesthesia was administered by the anesthetist to titrate to effect. The bilateral breasts were prepped and draped in the usual sterile fashion and a time out procedure was performed. Preoperative markings by plastics were made. Dr. Artice Halsted began by making incision on the right and then I used the localizer  to identify the tag in the breast at the 1 oclock position. Using the localizer probe to guide dissection bovie was used to dissect through the breast tissue  to the shortest distance identified by the probe. An roslyn clamped was then used to grasp the breast tissue and probe confirmed the TAG within the specimen. Circumferential dissection was performed and the tag was seen protruding through the superior end of the specimen. The specimen was then marked single short superior, single long lateral, double long anterior and sent of the field. Faxitron was used to confirm the TAG in the specimen. Additional tissue was grasped superiorly with an Roslyn were the TAG was protruding and circumferentially dissected. Stitch was placed marking true superior margin. Faxitron confirmed the 2 clips in the specimen. The wound was irrigated and suctioned dry and found to be hemostatic. Clips were placed in th the bed of the wound.  We then turned our attention to the left breast. Using incision made by plastics the large left breast was circumferentially dissected with overlying skin and tissue that was to be removed with the reconstruction. This was sent as left breast mass. The case was then completed by Dr. Mateo Tesfaye- please see his operative report for details.      Implant: * No implants in log *     Estimated Blood Loss:  50cc    Specimens: Right breast mass single short superior, single long lateral, double long anterior, right breast mass stitch marks true superior margin; left breast mass           Complications: None           Disposition: extubated, tolerated procedure well           Condition: Stable    Ameena Chen DO

## 2023-02-28 NOTE — ANESTHESIA POSTPROCEDURE EVALUATION
Department of Anesthesiology  Postprocedure Note    Patient: Batsheva Redman  MRN: [id number]  YOB: 1975  Date of evaluation: 2/28/2023      Procedure Summary     Date: 02/28/23 Room / Location: [facility] MAIN 05 / [facility] MAIN OR    Anesthesia Start: 3459 Anesthesia Stop: 6212    Procedures:       RIGHT BREAST LOCALIZED LUMPECTOMY, EXCISION OF LEFT BREAST MASS (Bilateral: Breast)      BILATERAL BREAST RECONSTRUCTION (Bilateral: Breast) Diagnosis:       Ductal carcinoma in situ (DCIS) of right breast      Primary malignant neoplasm of right breast ([address]      (Ductal carcinoma in situ (DCIS) of right breast [D05.11])    Surgeons: Jamin Guerrero DO; Marina Rizo MD Responsible Provider: Chidi Aguero MD    Anesthesia Type: General ASA Status: 2          Anesthesia Type: General    Salma Phase I: Salma Score: 10    Salma Phase II:        Anesthesia Post Evaluation    Patient location during evaluation: bedside  Patient participation: complete - patient participated  Level of consciousness: awake  Airway patency: patent  Nausea & Vomiting: no nausea  Complications: no  Cardiovascular status: hemodynamically stable  Respiratory status: acceptable  Hydration status: euvolemic  Multimodal analgesia pain management approach

## 2023-02-28 NOTE — OP NOTE
Plastic and Reconstructive Surgery  Operation/Procedure Note        Patient Name: Ingrid Harmon  Patient : 1975  Patient Sex: female  Patient Number: [id number]     Date of Operation: 2023  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 year-old female with right breast cancer. The options for management including the above procedure(s) as well as the risks/benefits/alternatives/potential complications of each were discussed in detail with the patient, including but not limited to pain, infection, bleeding, dehiscence, wound healing problems, nipple loss, nipple sensation changes, total nipple numbness, inability to breastfeed, asymmetry, need for additional treatment including surgery, recurrence, incomplete correction, damage to surrounding structures including bones/vessels/nerves/organs/tendons/muscles, poor cosmesis and poor scarring. After this extensive and detailed discussion, the patient expressed understanding of and agreement with the plan for the proposed procedure(s). Written consent was obtained. Description of the Procedure: The patient was taken to the treatment area and placed on the procedure table in a supine position.  Sequential compression devices were placed on bilateral lower extremities and all pressure points were appropriately padded. The surgical site was prepped and draped in the standard sterile fashion. A surgical time-out was performed with all team members present and attentive. Ancef was administered for surgical prophylaxis. Skin resection and pedicle markings were made in the preoperative area and confirmed in the OR. These were made based on an assessment of the patient's sternal uvalj-gr-etcjio distance, breast footprint/borders, inframammary fold, tumor location, desired size/shape and overall breast aesthetics. Local anesthetic was infiltrated widely and breast tourniquets were placed. Attention was first turned to the right breast, where the pedicle was de-epithelialized and all skin incisions were made. Dissection proceeded through the dermis. Dr. Arneta Peabody then proceed with her portion of the procedure (please see separate report). During this time, the contralateral breast reduction was performed using a standard superomedial pedicle and Wise pattern skin resection approach (Dr. Rona Atkins removal of the fibroadenoma documented separately). Repair proceeded using 2-0 Vicryl, 3-0 Monocryl and 4-0 Monocryl sutures. A 15 Fr drain was placed exiting laterally and secured with 3-0 Nylon. Attention was then returned to the cancer-side breast, where an adjacent tissue transfer of the chest tissues consisting of rotation/advancement and transposition of the nipple/areola complex was designed and dissected. Excess skin and parenchyma was sent for pathological analysis. The wound was irrigated and hemostasis was carefully ensured. The ATT then proceeded and was inset using 2-0 Vicryl, 3-0 Monocryl and 4-0 Monocryl sutures. A 15 Fr drain was placed exiting laterally and secured with 3-0 Nylon. Total dimensions of the ATT were 23 cm x 12 cm. Total breast specimen weights were 928 g on the left (including the fibroadenoma) and 302 g on the right.     All skin incisions were dressed with Mastisol and steri-strips. The drain exit sites were dressed with Biopatch and Tegaderm. The patient was placed into a bra padded with ABDs. The drain bulbs were placed to suction. The patient tolerated the procedure well. Complications: none     EBL: 100 cc     Implants: none  Drains/Packs/Catheters: 2x total 15 Fr JAK drains (1x in each breast)    Specimens for Microbiology: none  Specimens for Pathology: bilateral breast tissue    Counts: All sponge, needle and instrument counts were correct at the end of the procedure. Disposition/Instructions: PACU, then discharge home with follow-up in approximately 1 week        Doug Real MD, FACS  2/28/2023  10:39 AM

## 2023-02-28 NOTE — ANESTHESIA PRE PROCEDURE
Department of Anesthesiology  Preprocedure Note       Name:  Bacilio Lewis   Age:  41 y.o.  :  1975                                          MRN:  [id number]         Date:  2023      Surgeon: Kelley Braga):  Jono Estes MD    Procedure: Procedure(s):  RIGHT BREAST LOCALIZED LUMPECTOMY, EXCISION OF LEFT BREAST MASS  RIGHT BREAST ONCOPLASTIC RECONSTRUCTION, LEFT BREAST RECONSTRUCTION WITH ADJACENET TISSUE TRANSFER AFTER FIBROADENOMA EXCISION    Medications prior to admission:   Prior to Admission medications    Medication Sig Start Date End Date Taking? Authorizing Provider   acetaminophen (TYLENOL) 500 MG tablet Take 500 mg by mouth every 6 hours as needed    Ar Automatic Reconciliation       Current medications:    Current Facility-Administered Medications   Medication Dose Route Frequency Provider Last Rate Last Admin    sodium chloride flush 0.9 % injection 5-40 mL  5-40 mL IntraVENous 2 times per day Kevin Hummel, DO        sodium chloride flush 0.9 % injection 5-40 mL  5-40 mL IntraVENous PRN Kevin Hummel, DO        0.9 % sodium chloride infusion   IntraVENous PRN Kevin Hummel, DO        ceFAZolin (ANCEF) 2,000 mg in sterile water 20 mL IV syringe  2,000 mg IntraVENous [facility]        lidocaine PF 1 % injection 1 mL  1 mL IntraDERmal Once PRN Yue Salines, APRN - CRNA        lactated ringers IV soln infusion   IntraVENous Continuous Yue Salines, APRN - CRNA 125 mL/hr at 23 0639 New Bag at 23 0639    sodium chloride flush 0.9 % injection 5-40 mL  5-40 mL IntraVENous PRN Yue Salines, APRN - CRNA           Allergies: Allergies   Allergen Reactions    Tuna Oil [Fish Oil] Rash     Tuna fish only       Problem List:    Patient Active Problem List   Diagnosis Code    Low back pain M54.50    Fibroid uterus D25.9    Anemia due to blood loss, chronic D50.0       Past Medical History:  History reviewed.  No pertinent past medical history. Past Surgical History:        Procedure Laterality Date    HYSTERECTOMY (CERVIX STATUS UNKNOWN)      US BREAST BIOPSY W LOC DEVICE 1ST LESION RIGHT Right 01/12/2023    US BREAST BIOPSY W LOC DEVICE 1ST LESION RIGHT 1/12/2023 1316 [address]pe FERNANDO  AT [facility]       Social History:    Social History     Tobacco Use    Smoking status: Former     Types: Cigarettes    Smokeless tobacco: Never   Substance Use Topics    Alcohol use: Not on file                                Counseling given: Not Answered      Vital Signs (Current):   Vitals:    02/27/23 1304 02/28/23 0638   BP:  126/84   Pulse:  63   Resp:  16   Temp:  98.3 °F (36.8 °C)   TempSrc:  Oral   SpO2:  100%   Weight: 158 lb (71.7 kg) 158 lb 3.2 oz (71.8 kg)   Height: 5' 3\" (1.6 m)                                               BP Readings from Last 3 Encounters:   02/28/23 126/84   02/01/23 123/80   01/25/23 114/76       NPO Status: Time of last liquid consumption: 2200                        Time of last solid consumption: 2200                        Date of last liquid consumption: 02/27/23                        Date of last solid food consumption: 02/27/23    BMI:   Wt Readings from Last 3 Encounters:   02/28/23 158 lb 3.2 oz (71.8 kg)   02/01/23 158 lb 6.4 oz (71.8 kg)   01/25/23 157 lb (71.2 kg)     Body mass index is 28.02 kg/m².     CBC:   Lab Results   Component Value Date/Time    WBC 6.1 02/27/2023 11:43 AM    RBC 5.41 02/27/2023 11:43 AM    HGB 12.2 02/27/2023 11:43 AM    HCT 39.6 02/27/2023 11:43 AM    MCV 73.2 02/27/2023 11:43 AM    RDW 17.4 02/27/2023 11:43 AM     02/27/2023 11:43 AM       CMP:   Lab Results   Component Value Date/Time     02/27/2023 11:43 AM    K 4.3 02/27/2023 11:43 AM     02/27/2023 11:43 AM    CO2 28 02/27/2023 11:43 AM    BUN 10 02/27/2023 11:43 AM    CREATININE 0.60 02/27/2023 11:43 AM    GFRAA >60 05/14/2021 09:50 AM    AGRATIO 1.0 05/14/2021 09:50 AM    LABGLOM >60 02/27/2023 11:43 AM    GLUCOSE 62 02/27/2023 11:43 AM    PROT 6.8 05/14/2021 09:50 AM    CALCIUM 8.8 02/27/2023 11:43 AM    BILITOT 0.2 05/14/2021 09:50 AM    ALKPHOS 64 05/14/2021 09:50 AM    AST 9 05/14/2021 09:50 AM    ALT 17 05/14/2021 09:50 AM       POC Tests: No results for input(s): POCGLU, POCNA, POCK, POCCL, POCBUN, POCHEMO, POCHCT in the last 72 hours. Coags: No results found for: PROTIME, INR, APTT    HCG (If Applicable): No results found for: PREGTESTUR, PREGSERUM, HCG, HCGQUANT     ABGs: No results found for: PHART, PO2ART, QMY3DIZ, ARS7CUE, BEART, A1EJMJUE     Type & Screen (If Applicable):  No results found for: LABABO, LABRH    Drug/Infectious Status (If Applicable):  Lab Results   Component Value Date/Time    HEPCAB 0.05 04/16/2019 01:14 PM    HEPCAB NEGATIVE 04/16/2019 01:14 PM       COVID-19 Screening (If Applicable):   Lab Results   Component Value Date/Time    COVID19 Not Detected 08/05/2020 11:00 AM           Anesthesia Evaluation  Patient summary reviewed and Nursing notes reviewed  Airway: Mallampati: I  TM distance: >3 FB   Neck ROM: full  Mouth opening: > = 3 FB   Dental: normal exam         Pulmonary:Negative Pulmonary ROS breath sounds clear to auscultation                             Cardiovascular:  Exercise tolerance: good (>4 METS),           Rhythm: regular  Rate: normal                    Neuro/Psych:   Negative Neuro/Psych ROS              GI/Hepatic/Renal: Neg GI/Hepatic/Renal ROS            Endo/Other:    (+) blood dyscrasia: anemia:., .                 Abdominal:             Vascular: negative vascular ROS. Other Findings:           Anesthesia Plan      general     ASA 2       Induction: intravenous. Anesthetic plan and risks discussed with patient.       Plan discussed with surgical team.                    Vidya Nielsen MD   2/28/2023

## 2023-02-28 NOTE — H&P
Progress Notes by Dipti Willett DO at 23 1400 (continued)              Author: Dipti Willett DO  Service: --  Author Type: Physician       Filed: 23 1448  Encounter Date: 2023  Status: Signed          : Dipti Willett DO (Physician)               Dereck Gorman         Ms. García Pisano here with her sister who is a 41year old woman who was referred for new right DCIS, nuclear grade 2-3, ER/CT positive. The area of concern was identified on screening mammogram.   She has a palpable mass on her left breast for many years that has bene painful but nothing on her right. She has no nipple discharge. She has no breast pain. There is family history of breast cancer in a maternal aunt. There is no family history of  pancreatic, ovarian, or colon cancer. Her most recent previous mammogram was 2017 was BIRADS 4 for spiculated mass right breast at 12 oclock and large left breast mass. Pathology report from both biopsies were benign demonstrating sclerosis adenosis. The left breast has continues to enlarge over the years and is very painful with caffeine or chocolate intake. Menarche age 10. She has one daughter age 15 whom she did not breast fed. She underwent total abdominal hysterectomy in 2020 for dysfunctional  uterine bleeding secondary to large fibroids. She has never been on hormone replacement therapy. She is originally from [place]. Currently working at [employer]              Breast/GYN history:       OB History                           1                  Para        1                  Term        1                                             AB                           Living        1                                  SAB                           IAB                           Ectopic                           Molar                           Multiple                           Live Births        1 Past Medical History:        Diagnosis  Date           Anemia NEC  as a child          iron deficiency            Breast lump  2017          Left Breast Lump           Fibroid                     Past Surgical History:         Procedure  Laterality  Date            HX  SECTION    2008       HX GYN    2006          fibroids            MS UNLISTED PROCEDURE BREAST              right breast biopsy                    Current Outpatient Medications on File Prior to Visit          Medication  Sig  Dispense  Refill             acetaminophen (TYLENOL) 500 mg tablet  Take 500 mg by mouth every six (6) hours as needed for Pain. No current facility-administered medications on file prior to visit. No Known Allergies        Social History                Tobacco Use           Smoking status:  Former              Packs/day:  0.09         Types:  Cigarettes, Cigars         Quit date:  2007         Years since quitting:  15.5           Smokeless tobacco:  Never          Tobacco comments:             smokes when stressed; no smoking within 24 hours of dos       Substance Use Topics           Alcohol use: Yes              Alcohol/week:  0.8 standard drinks         Types:  1 Standard drinks or equivalent per week             Comment: rare           Drug use: No                   Family History         Problem  Relation  Age of Onset            Hypertension  Mother         No Known Problems  Father         Cancer  Maternal Aunt                Breast              ROS:    Review of Systems    Constitutional:  Negative for fatigue, fever and unexpected weight change. Respiratory:  Negative for chest tightness and shortness of breath. Cardiovascular:  Negative for chest pain and palpitations. Skin:  Negative for pallor and rash. Hematological:  Negative for adenopathy. Does not bruise/bleed easily.        Physical Exam:   Visit Vitals      BP  114/76     Pulse  76     Temp  98 °F (36.7 °C)     Resp  18     Ht  5' 3\" (1.6 m)     Wt  71.2 kg (157 lb)     LMP  08/08/2020 (Approximate)     SpO2  98%        BMI  27.81 kg/m²     BMI: [unfilled]      Physical Exam   Constitutional :        Appearance: Normal appearance. She is normal weight. Cardiovascular:       Rate and Rhythm: Normal rate. Pulmonary:       Effort: Pulmonary effort is normal.    Chest:    Breasts:      Right: Mass present. No swelling, bleeding, inverted nipple, nipple discharge, skin change  or tenderness. Left: Mass present. No swelling, bleeding, inverted nipple, nipple discharge, skin change or tenderness. Lymphadenopathy:       Upper Body:       Right upper body: No supraclavicular, axillary or pectoral adenopathy. Left upper body: No supraclavicular, axillary or pectoral adenopathy. Skin:      General: Skin is warm and dry. Neurological:       General: No focal deficit present. Mental Status: She is alert and oriented to person, place, and time. Mental status is at baseline. Psychiatric:          Mood and Affect: Mood normal.          Behavior: Behavior normal.          Thought Content: Thought content normal.          Judgment: Judgment normal.          Imaging:     Narrative & Impression       DIGITAL BILATERAL DIAGNOSTIC MAMMOGRAM with CAD  WITH DIGITAL BREAST   TOMOSYNTHESIS 2D/3D EXAM AND BILATERAL BREAST  ULTRASOUND LIMITED       HISTORY: Patient with left breast mass. Prior bilateral breast biopsies   demonstrating sclerosing adenosis with associated microcalcifications in the   right breast and sclerosing adenosis with associated microcalcifications and   extensive stromal fibrosis in the left breast.       COMPARISON: 2013, 2017. DIAGNOSTIC DIGITAL MAMMOGRAM FINDINGS:       Technique: Digital views of bilateral breasts were obtained in the 2D/3D   projections  with computer assisted detection, iCAD Second Look 7.2- H.         FINDINGS:       In the left upper outer quadrant, there is a large oval circumscribed mass with   associated biopsy clips. This overall appears similar compared to prior 2017   exam however has increased in size compared to prior 2013 exam.       There is a biopsy clip in the right breast. Adjacent to the biopsy clip in the   right upper outer quadrant, there is a mass with associated architectural   distortion. TARGETED BILATERAL BREAST ULTRASOUND FINDINGS:       Targeted ultrasound the left breast was performed. Sonographic imaging   demonstrates a large oval circumscribed hypoechoic mass the 12:00 retroareolar   region extending 13 cm from the nipple. This measures approximately 11.7 x 11.7   x 3.1 cm. Targeted ultrasound of the right breast demonstrates a small irregular   hypoechoic mass with indistinct margins at the 1:00 position, 6 cm from the   nipple measuring 1.4 x 1.2 x 0.9 cm. A BB was placed on this lesion and   tangential mammographic views were obtained. This corresponds the central area   of spiculation seen mammographically. IMPRESSION   1. Mass in the right breast the 1:00 position, 6 cm from the nipple is issues. Recommend ultrasound-guided biopsy. 2. Left breast mass overall appears unchanged compared 2017. A clip is present   demonstrating that this was previously biopsied. There is a large size of this   breast mass, patient was offered an option of a breast consult for possible   excision. BIRADS 4: Suspicious abnormality, biopsy should be considered       Breast Density C: The breasts are heterogeneously dense, which may obscure small   masses. Pathology:   ===================================================================                                    * * *FINAL DIAGNOSIS* * *       RIGHT BREAST  MASS, 1:00, 6 CM FROM NIPPLE, CORE NEEDLE BIOPSY        DUCTAL CARCINOMA IN SITU, SOLID, CRIBRIFORM, AND APOCRINE SUBTYPES,        NUCLEAR GRADE 2-3 WITHOUT NECROSIS.         ESTROGEN RECEPTOR AND PROGESTERONE RECEPTOR STUDIES PENDING. GROSS DESCRIPTION:   The specimen is received in formalin labeled with the patient's   identifiers and \"right breast mass 1:00 6cm from nipple\". The specimen   consists of multiple fibrofatty breast cores and fragments aggregating to    2.5 x 2.5 x 0.3 cm. The specimen is entirely submitted in two cassettes. Breast fixation times   Out of body time and date: 1:28 PM 1/12/23   Timing date placed in formalin: 1:33 PM 1/12/23   Timing date placed on processor: 11:00  AM 1/13/23     TS 1/13/2023 08:32 AM     DAC/ts1       * * *MICROSCOPIC DESCRIPTION:* * *     Representative sections through the \"right breast mass 1:00 6 cm from   nipple\" show multiple elongate fragments of breast  parenchyma and stroma. The breast shows areas of stromal fibrosis with a focal area of nodular   adenosis. This area of adenosis shows involvement by ductal carcinoma in   situ showing solid, cribriform, and apocrine subtypes. The DCIS  shows   nuclear grade 23 features. No comedo necrosis is identified. Estrogen   receptor and progesterone receptor immunohistochemistry is pending and   results will be issued as an addendum. A focal area of intraductal   papilloma  is also noticed. * * *Addendum (HR)* * *          Addendum Diagnosis   ER/MS Results:   Estrogen Receptor (ER):     Positive (20% of cells stain with strong   intensity). Progesterone Receptor (PgR): Positive (20% of cells stain with strong   intensity). Methods:   Block A2: 3 single stain IHC slides for morphometric analysis   (semi-quantitative & quantitative reporting). Fixative: 10%  neutral buffered formalin. Estrogen Receptor: FDA cleared Roche/Friona CONFIRM [id number]. Progesterone Receptor: FDA cleared Roche/Friona CONFIRM IE2   Cold Ischemia and Fixation Times: Meets requirements specified in the   latest version  of the ASCO/CAP guidelines.        Impression: ICD-10-CM  ICD-9-CM             1.  Ductal carcinoma in situ (DCIS) of right breast   D05.11  233.0  REFERRAL TO ONCOLOGY                REFERRAL TO RADIATION ONCOLOGY           REFERRAL TO PLASTIC SURGERY           SCHEDULE SURGERY           CBC WITH AUTOMATED DIFF           METABOLIC PANEL, COMPREHENSIVE           EKG, 12 LEAD, INITIAL                  2.  Mass overlapping multiple quadrants of left breast   N63.25  611.72                    We discussed the ways in which DCIS differs from invasive carcinoma and my recommendations for surgery on the left breast mass as well. We discussed that there are many options for treatment of breast cancer. Surgery, chemotherapy, radiation and hormone therapy are all tools that may be used in the treatment of breast cancer. For each patient, we determine which will be most beneficial  based on her individual set of circumstances. For some patients all four treatment categories will be recommended. For others one or more of these options are not appropriate. We began by reviewing her imaging thus far as well as pathology in detail. Chemotherapy was addressed first.  Chemotherapy is systemic treatment aimed largely to decrease chance of spread or recurrence of cancer. It is not needed for DCIS. Regarding surgery, there are two main options, lumpectomy or mastectomy. We discussed both in detail. Overall survival and distant recurrence rates are the same. The decision is generally a personal decision more so than a medical one. Lumpectomy is also known as breast conservation surgery or partial mastectomy. The goal is to remove the area of concern as well as surrounding area of uninvolved tissue (\"clear margins\"). Radiation is almost always recommended with lumpectomy to allow  for acceptable local recurrence rates. Local recurrence rates are approximately 6% after lumpectomy with radiation.   Risks, benefits and options were discussed in detail to include, but not limited to, bleeding, infection, risks of anesthesia, injury  to surrounding structures and other unforeseen events such as stroke, heart attack or death. Mastectomy was then addressed. With mastectomy, almost all of the breast tissue is removed. We are not able to remove 100% of the breast tissue. The risk of local recurrence is approximately 2-4% after mastectomy. The overlying skin is generally numb. Most often, the numbness is permanent. Mastectomy can be performed with or without reconstruction. The reconstruction is performed by the plastic surgeon. Commonly it is a multi-step process with placement of tissue expanders as the first step. If  she is interested in reconstruction, I will refer her to plastic surgery. Often we are able to perform a nipple sparing mastectomy with reconstruction. The reconstructed breast differs in many ways from the native breast.  The goal is that in a bra  or clothing, no one can tell she has had a mastectomy. Radiation generally is not needed after mastectomy. There are some circumstances, usually based on size, margins, local extension or lymph node status, where post-mastectomy radiation is recommended. Risks, benefits and options were discussed in detail to include, but not limited to, bleeding, infection, risks of anesthesia, injury to surrounding structures and other unforeseen events such as stroke, heart attack or death. Routine screening mammogram  is not recommended after mastectomy. Mastectomy is generally not needed for DCIS. We can get plastics involved to help with resulting deformity that would result from removal of the left breast mass which she agrees with. We discussed radiation. Radiation is a local therapy aimed to decrease the chance of local recurrence. It is administered under the direction of a radiation oncologist.  Radiation is almost always recommended with lumpectomy.   It generally is not needed  after mastectomy. There are some circumstances, usually based on size, margins, local extension or lymph node status, where post-mastectomy radiation is recommended. Most commonly it is administered five days a week for up to seven weeks. Most of the  side effects, with the exception of fatigue, are local.    For women with implants, the risk of contracture and other complication may be higher with radiation therapy. Anti-hormone or hormone blocking therapy is used in hormone sensitive, estrogen receptor positive breast cancer. It is generally recommended for 5-10 years. There are two categories of hormone blocking medications. Tamoxifen is a selective estrogen  reuptake modulator (SERM). There are several aromatase inhibitors. These medications are generally prescribed by a medical oncologist.           After discussing the above, Ms. Jane Salinas prefers right breast localized lumpectomy, excision of left breast mass with reconstruction with plastics. We will schedule surgery, referrals to radiation and oncology. Preop labs, EKG, CXR have been ordered. All questions were answered. She was asked to call with any additional questions or concerns.

## 2023-02-28 NOTE — INTERVAL H&P NOTE
Plastic and Reconstructive Surgery  H&P Update      The patient was seen and examined in the pre-operative area. There are no changes to the H&P as documented in the most recent clinic visit. The risks, benefits, alternatives and potential complications of the procedure(s) were again discussed in detail with the patient, including but not limited to pain, infection, bleeding, dehiscence, incomplete healing, asymmetry, damage to surrounding structures including nerves/vessels/bones/tendons/muscles/skin, paralysis, malunion/nonunion, need for additional treatment including surgery, recurrence, incomplete correction, poor cosmesis and poor scarring. Potential implant problems including exposure, infection, extrusion, rupture, capsular contracture, ALCL, rotation and malposition were also discussed if applicable. The patient had an opportunity to ask all questions and to have all concerns addressed. Written consent was obtained and confirmed. The patient was marked. To the OR today for bilateral breast reconstruction. Doug Shepherd MD, FACS  2/28/2023  7:29 AM

## 2023-03-01 ENCOUNTER — CLINICAL DOCUMENTATION (OUTPATIENT)
Age: 48
End: 2023-03-01

## 2023-03-01 NOTE — PROGRESS NOTES
[address]  Breast & Colorectal Oncology Nurse Navigator Encounter    Name: Cindy Mckeon  Age: 41 y.o.  : 1975  Diagnosis: RIGHT BREAST DUCTAL CARCINOMA IN SITU S/P LUMPECTOMY WITH RECONSTRUCTION    Encounter type:  []Initial Navigator Encounter  []Patient Initiated  [x]Navigator Follow-up  []Other:     Narrative:   Checked chart to ensure post op appointment scheduled. Reached out to [employer],  and was told she will get patient scheduled. Noted pending referrals to Med ONC and Rad ONC. I will follow up.         Interdisciplinary Team:    Nurse Navigator: Burgess Hyman, TALON ToledoN, RN  Breast & Colorectal Cancer Nurse Navigator    [address]  [place] [address] [address] - [facility] [address] [place], [address]  Office number [phone]  [email or web address]  Good Help to Those in [facility]

## 2023-03-06 ENCOUNTER — OFFICE VISIT (OUTPATIENT)
Age: 48
End: 2023-03-06

## 2023-03-06 VITALS
TEMPERATURE: 98 F | HEIGHT: 63 IN | BODY MASS INDEX: 27.82 KG/M2 | OXYGEN SATURATION: 99 % | WEIGHT: 157 LBS | HEART RATE: 80 BPM | DIASTOLIC BLOOD PRESSURE: 79 MMHG | SYSTOLIC BLOOD PRESSURE: 114 MMHG

## 2023-03-06 DIAGNOSIS — D24.2 FIBROADENOMA OF LEFT BREAST: ICD-10-CM

## 2023-03-06 DIAGNOSIS — D05.11 DUCTAL CARCINOMA IN SITU (DCIS) OF RIGHT BREAST: Primary | ICD-10-CM

## 2023-03-06 PROCEDURE — 99024 POSTOP FOLLOW-UP VISIT: CPT | Performed by: SURGERY

## 2023-03-06 RX ORDER — IBUPROFEN 800 MG/1
TABLET ORAL
COMMUNITY
Start: 2023-03-01

## 2023-03-06 NOTE — PROGRESS NOTES
Jim Mena is a 41 y.o. female (: 1975) presenting to address:    Chief Complaint   Patient presents with    Post-Op Check     R breast lumpectomy, L breast excision of mass         Medication list and allergies have been reviewed with Joslyn Smith and updated as of today's date. I have gone over all Medical, Surgical and Social History with Joslyn Smith and updated/added the information accordingly. 1. Have you been to the ER, Urgent Care or Hospitalized since your last visit? No      2. Have you followed up with your PCP or any other Physicians since your procedure/ last office visit?    No

## 2023-03-06 NOTE — PROGRESS NOTES
CC:   Chief Complaint   Patient presents with    Post-Op Check     R breast lumpectomy, L breast excision of mass          Assessment:    ICD-10-CM    1. Ductal carcinoma in situ (DCIS) of right breast  D05.11       2. Fibroadenoma of left breast  D24.2           Plan: I reviewed the pathology report with the patient in detail demonstrating large fibroadenoma left breast and DCIS on the right with close superior margin on primary specimen. Margins are negative and all additional breast tissue from this area was removed and sent as specimen which was negative. There is no need for additional surgery. She will go on for radiation and states she has upcoming appointment. We will refer her to [facility] to be fitted for bras. Wound care and drain management per plastics. I would like to see her back in 2 months or sooner should she have any questions or concerns. She agrees with this plan. HPI:  Carmine Juarez is a 41 y.o. female who is here today for her initial follow up from surgery on 2/28/2023 for right breast localized lumpectomy, excision of left breast mass. Overall she is doing well with no fevers or chills. She has been emptying and recording her drain output as instructed and has follow up with plastics tomorrow. 1/25/2023  Ms. Carmine Juarez here with her sister who is a 41year old woman who was referred for new right DCIS, nuclear grade 2-3, ER/AK positive. The area of concern was identified on screening mammogram.   She has a palpable mass on her left breast for many years that has bene painful but nothing on her right. She has no nipple discharge. She has no breast pain. There is family history of breast cancer in a maternal aunt. There is no family history of  pancreatic, ovarian, or colon cancer. Her most recent previous mammogram was 1/2017 was BIRADS 4 for spiculated mass right breast at 12 oclock and large left breast mass.  Pathology report from both biopsies were benign demonstrating sclerosis adenosis. The left breast has continues to enlarge over the years and is very painful with caffeine or chocolate intake. Menarche age 10. She has one daughter age 15 whom she did not breast fed. She underwent total abdominal hysterectomy in 8/2020 for dysfunctional  uterine bleeding secondary to large fibroids. She has never been on hormone replacement therapy. She is originally from [place]. Currently working at [employer] Allergies: Allergies   Allergen Reactions    Tuna Oil [Fish Oil] Rash     Tuna fish only       Medication Review:  Current Outpatient Medications on File Prior to Visit   Medication Sig Dispense Refill    ibuprofen (ADVIL;MOTRIN) 800 MG tablet TAKE 1 TABLET BY MOUTH EVERY 8 HOURS AS NEEDED      acetaminophen (TYLENOL) 500 MG tablet Take 500 mg by mouth every 6 hours as needed (Patient not taking: Reported on 3/6/2023)       No current facility-administered medications on file prior to visit. Systems Review:  Review of Systems   Constitutional:  Negative for fatigue and fever. Hematological:  Negative for adenopathy. Does not bruise/bleed easily. PMH:  History reviewed. No pertinent past medical history.     Surgical History:  Past Surgical History:   Procedure Laterality Date    BREAST BIOPSY Bilateral 2/28/2023    Right breast localized lumpectomy, Left breast excision of mass performed by Herb Price DO at [facility],  [address] Bilateral 2/28/2023    right breast oncoplastic reconstruction via 23 cm x 12 cm chest adjacent tissue transfer; left breast reduction for symmetry performed by Nina Perea MD at [address] ([address] [place] [address])      [address] LOC DEVICE 1ST LESION RIGHT Right 01/12/2023    US BREAST BIOPSY W LOC DEVICE 1ST LESION RIGHT 1/12/2023 [facility] US AT [facility]       Social History:  Social History     Socioeconomic History    Marital status: Single     Spouse name: None    Number of children: None Years of education: None    Highest education level: None   Tobacco Use    Smoking status: Former     Types: Cigarettes    Smokeless tobacco: Never       Family History:  History reviewed. No pertinent family history.     Hospital Outpatient Visit on 02/27/2023   Component Date Value Ref Range Status    WBC 02/27/2023 6.1  4.6 - 13.2 K/uL Final    RBC 02/27/2023 5.41 (A)  4.20 - 5.30 M/uL Final    Hemoglobin 02/27/2023 12.2  12.0 - 16.0 g/dL Final    Hematocrit 02/27/2023 39.6  35.0 - 45.0 % Final    MCV 02/27/2023 73.2 (A)  78.0 - 100.0 FL Final    MCH 02/27/2023 22.6 (A)  24.0 - 34.0 PG Final    MCHC 02/27/2023 30.8 (A)  31.0 - 37.0 g/dL Final    RDW 02/27/2023 17.4 (A)  11.6 - 14.5 % Final    Platelets 58/48/1309 258  135 - 420 K/uL Final    Nucleated RBCs 02/27/2023 0.0  0  WBC Final    nRBC 02/27/2023 0.00  0.00 - 0.01 K/uL Final    Seg Neutrophils 02/27/2023 60  40 - 73 % Final    Lymphocytes 02/27/2023 29  21 - 52 % Final    Monocytes 02/27/2023 9  3 - 10 % Final    Eosinophils % 02/27/2023 2  0 - 5 % Final    Basophils 02/27/2023 1  0 - 2 % Final    Immature Granulocytes 02/27/2023 1 (A)  0.0 - 0.5 % Final    Segs Absolute 02/27/2023 3.7  1.8 - 8.0 K/UL Final    Absolute Lymph # 02/27/2023 1.8  0.9 - 3.6 K/UL Final    Absolute Mono # 02/27/2023 0.5  0.05 - 1.2 K/UL Final    Absolute Eos # 02/27/2023 0.1  0.0 - 0.4 K/UL Final    Basophils Absolute 02/27/2023 0.0  0.0 - 0.1 K/UL Final    Absolute Immature Granulocyte 02/27/2023 0.0  0.00 - 0.04 K/UL Final    Differential Type 02/27/2023 AUTOMATED    Final    Sodium 02/27/2023 141  136 - 145 mmol/L Final    Potassium 02/27/2023 4.3  3.5 - 5.5 mmol/L Final    Chloride 02/27/2023 108  100 - 111 mmol/L Final    CO2 02/27/2023 28  21 - 32 mmol/L Final    Anion Gap 02/27/2023 5  3.0 - 18 mmol/L Final    Glucose 02/27/2023 62 (A)  74 - 99 mg/dL Final    BUN 02/27/2023 10  7.0 - 18 MG/DL Final    Creatinine 02/27/2023 0.60  0.6 - 1.3 MG/DL Final    Bun/Cre Ratio 02/27/2023 17  12 - 20   Final    Est, Glom Filt Rate 02/27/2023 >60  >60 ml/min/1.73m2 Final    Comment:      Pediatric calculator link: Cori.at. org/professionals/kdoqi/gfr_calculatorped       These results are not intended for use in patients <25years of age. eGFR results are calculated without a race factor using  the 2021 CKD-EPI equation. Careful clinical correlation is recommended, particularly when comparing to results calculated using previous equations. The CKD-EPI equation is less accurate in patients with extremes of muscle mass, extra-renal metabolism of creatinine, excessive creatine ingestion, or following therapy that affects renal tubular secretion. Calcium 02/27/2023 8.8  8.5 - 10.1 MG/DL Final    Ventricular Rate 02/27/2023 68  BPM Final    Atrial Rate 02/27/2023 68  BPM Final    P-R Interval 02/27/2023 148  ms Final    QRS Duration 02/27/2023 72  ms Final    Q-T Interval 02/27/2023 350  ms Final    QTc Calculation (Bazett) 02/27/2023 372  ms Final    P Axis 02/27/2023 59  degrees Final    R Axis 02/27/2023 -28  degrees Final    T Axis 02/27/2023 10  degrees Final    Diagnosis 02/27/2023 Sinus rhythm with fusion complexes   Final       XR CHEST (2 VW)  Narrative: INDICATION: Encounter for other preprocedural examination, breast surgery    TECHNIQUE: Two views of the chest    COMPARISON: None    FINDINGS: The lungs are adequately inflated. No focal consolidation, effusion or  pneumothorax. Heart size within normal limits. No acute osseous abnormality. Remote/healed right clavicle fracture deformity. Impression: No acute cardiopulmonary findings. Physical Exam:  /79 (Site: Right Upper Arm, Position: Sitting)   Pulse 80   Temp 98 °F (36.7 °C) (Temporal)   Ht 5' 3\" (1.6 m)   Wt 157 lb (71.2 kg)   SpO2 99%   BMI 27.81 kg/m²  BMI: Body mass index is 27.81 kg/m².     Physical Exam  Chest:      Comments: Incisions clean, dry,intact   JAK right minimal serous    JAK left minimal serous      I have reviewed the information entered by the clinical staff and/or patient and verified it as accurate or edited where necessary.      Electronically signed by:    Abbey Conklin DO, MPH

## 2023-04-21 ENCOUNTER — TELEPHONE (OUTPATIENT)
Facility: CLINIC | Age: 48
End: 2023-04-21

## 2023-04-21 NOTE — TELEPHONE ENCOUNTER
----- Message from Kostas Last sent at 4/20/2023  3:16 PM EDT -----  Subject: Message to Provider    QUESTIONS  Information for Provider? Patient is wanting to make an appt to get her   ears cleaned out. She believes there is a wax build up and needs to make   sure it is nothing to do with hearing loss.  ---------------------------------------------------------------------------  --------------  Cesar Hinojosa AMY  [phone]; OK to leave message on voicemail  ---------------------------------------------------------------------------  --------------  SCRIPT ANSWERS  Relationship to Patient? Self  (Is the patient requesting to see the provider for a procedure?)?  Yes

## 2023-04-21 NOTE — TELEPHONE ENCOUNTER
I called pt to schedule appnt but there was no answer. I left her a VM to call the office back to schedule for wax buildup.

## 2023-05-03 ENCOUNTER — OFFICE VISIT (OUTPATIENT)
Facility: CLINIC | Age: 48
End: 2023-05-03
Payer: COMMERCIAL

## 2023-05-03 VITALS
SYSTOLIC BLOOD PRESSURE: 123 MMHG | BODY MASS INDEX: 28.6 KG/M2 | WEIGHT: 161.4 LBS | RESPIRATION RATE: 17 BRPM | TEMPERATURE: 97.4 F | OXYGEN SATURATION: 100 % | HEART RATE: 84 BPM | HEIGHT: 63 IN | DIASTOLIC BLOOD PRESSURE: 81 MMHG

## 2023-05-03 DIAGNOSIS — H61.23 BILATERAL IMPACTED CERUMEN: Primary | ICD-10-CM

## 2023-05-03 PROCEDURE — 99214 OFFICE O/P EST MOD 30 MIN: CPT | Performed by: FAMILY MEDICINE

## 2023-05-03 PROCEDURE — 69210 REMOVE IMPACTED EAR WAX UNI: CPT | Performed by: FAMILY MEDICINE

## 2023-05-03 SDOH — ECONOMIC STABILITY: FOOD INSECURITY: WITHIN THE PAST 12 MONTHS, THE FOOD YOU BOUGHT JUST DIDN'T LAST AND YOU DIDN'T HAVE MONEY TO GET MORE.: NEVER TRUE

## 2023-05-03 SDOH — ECONOMIC STABILITY: FOOD INSECURITY: WITHIN THE PAST 12 MONTHS, YOU WORRIED THAT YOUR FOOD WOULD RUN OUT BEFORE YOU GOT MONEY TO BUY MORE.: NEVER TRUE

## 2023-05-03 SDOH — ECONOMIC STABILITY: INCOME INSECURITY: HOW HARD IS IT FOR YOU TO PAY FOR THE VERY BASICS LIKE FOOD, HOUSING, MEDICAL CARE, AND HEATING?: NOT VERY HARD

## 2023-05-03 SDOH — ECONOMIC STABILITY: HOUSING INSECURITY
IN THE LAST 12 MONTHS, WAS THERE A TIME WHEN YOU DID NOT HAVE A STEADY PLACE TO SLEEP OR SLEPT IN A SHELTER (INCLUDING NOW)?: NO

## 2023-05-03 NOTE — PROGRESS NOTES
Nidhi Faye is a 41 y.o.  female and presents for ear cleaning for ear wax build up. Chief Complaint   Patient presents with    Ear Fullness       Subjective:  Al Alvarado is a 41year old female with a history of breast cancer who presents for ear cleaning. She notes she has had her ears cleaned in a physicians office before for ear wax. She notes she has noticed her bilateral hearing become slightly impaired. She uses an ear wax cleaning kit at home with a ceruminolytic which does help somewhat. She denies any ear pain, unusual ear drainage, headaches, fevers, or chills. Current Outpatient Medications   Medication Sig Dispense Refill    ibuprofen (ADVIL;MOTRIN) 800 MG tablet TAKE 1 TABLET BY MOUTH EVERY 8 HOURS AS NEEDED      acetaminophen (TYLENOL) 500 MG tablet Take 500 mg by mouth every 6 hours as needed (Patient not taking: Reported on 3/6/2023)       No current facility-administered medications for this visit. Allergies   Allergen Reactions    Tuna Oil [Fish Oil] Rash     Tuna fish only     History reviewed. No pertinent past medical history. Past Surgical History:   Procedure Laterality Date    BREAST BIOPSY Bilateral 2/28/2023    Right breast localized lumpectomy, Left breast excision of mass performed by Paulette Hanson DO at [facility],  [address] Bilateral 2/28/2023    right breast oncoplastic reconstruction via 23 cm x 12 cm chest adjacent tissue transfer; left breast reduction for symmetry performed by Leroy Burgess MD at [address] ([address] [place] [address])      [address] RIGHT Right 01/12/2023    US BREAST BIOPSY W LOC DEVICE 1ST LESION RIGHT 1/12/2023 [address] [facility] AT [facility]     History reviewed. No pertinent family history.   Social History     Tobacco Use    Smoking status: Former     Types: Cigarettes    Smokeless tobacco: Never   Substance Use Topics    Alcohol use: Not on file       ROS   General:

## 2023-05-03 NOTE — PROGRESS NOTES
Ruperto Aguayo is a 41 y.o. presents today for   Chief Complaint   Patient presents with    Ear Fullness         Depression Screening:   PHQ-9 Questionaire 2/1/2023   Little interest or pleasure in doing things 0   Feeling down, depressed, or hopeless 0   PHQ-9 Total Score 0       Abuse Screening: AMB Abuse Screening 5/3/2023   Do you ever feel afraid of your partner? N   Are you in a relationship with someone who physically or mentally threatens you? N   Is it safe for you to go home? Y       Learning Assessment:  No question data found. Fall Risk:  Fall Risk 5/3/2023   2 or more falls in past year? no   Fall with injury in past year? no           Coordination of Care:   1. \"Have you been to the ER, urgent care clinic since your last visit? Hospitalized since your last visit? \" no    2. \"Have you seen or consulted any other health care providers outside of the [address] [place] [address] since your last visit? \" no    3. For patients aged 39-70: Has the patient had a colonoscopy / FIT/ Cologuard?yes    If the patient is female:    4. For patients aged 41-77: Has the patient had a mammogram within the past 2 years? yes    5. For patients aged 21-65: Has the patient had a pap smear? yes    Health Maintenance: reviewed and discussed and ordered per Provider.     Health Maintenance Due   Topic Date Due    COVID-19 Vaccine (5 - Booster) 06/25/2021    Colorectal Cancer Screen  Never done

## 2023-05-08 ENCOUNTER — OFFICE VISIT (OUTPATIENT)
Age: 48
End: 2023-05-08

## 2023-05-08 VITALS
HEIGHT: 63 IN | TEMPERATURE: 97.6 F | DIASTOLIC BLOOD PRESSURE: 77 MMHG | BODY MASS INDEX: 28 KG/M2 | RESPIRATION RATE: 16 BRPM | OXYGEN SATURATION: 100 % | WEIGHT: 158 LBS | SYSTOLIC BLOOD PRESSURE: 115 MMHG | HEART RATE: 79 BPM

## 2023-05-08 DIAGNOSIS — D05.11 DUCTAL CARCINOMA IN SITU (DCIS) OF RIGHT BREAST: Primary | ICD-10-CM

## 2023-05-08 DIAGNOSIS — D24.2 FIBROADENOMA OF LEFT BREAST: ICD-10-CM

## 2023-05-08 PROCEDURE — 99024 POSTOP FOLLOW-UP VISIT: CPT | Performed by: SURGERY

## 2023-05-08 NOTE — PROGRESS NOTES
Paige Melton is a 41 y.o. female (: 1975)     Chief Complaint   Patient presents with    Post-Op Check     Right breast localized lumpectomy, Left breast excision of mass 23       Medication list and allergies have been reviewed with Joslyn Smith and updated as of today's date. I have gone over all Medical, Surgical and Social History with Joslyn Smith and updated/added the information accordingly. 1. Have you been to the ER, urgent care clinic since your last visit? Hospitalized since your last visit? No    2. Have you seen or consulted any other health care providers outside of the [address] [place] [address] since your last visit? Include any pap smears or colon screening.  No
Procedure Laterality Date    BREAST BIOPSY Bilateral 2/28/2023    Right breast localized lumpectomy, Left breast excision of mass performed by Derik Da Silva DO at [address] Bilateral 2/28/2023    right breast oncoplastic reconstruction via 23 cm x 12 cm chest adjacent tissue transfer; left breast reduction for symmetry performed by Nish Ojeda MD at [address] [facility] [address] ([address] [facility] [address])      US BREAST BIOPSY W LOC DEVICE 1ST LESION RIGHT Right 01/12/2023    US BREAST BIOPSY W LOC DEVICE 1ST LESION RIGHT 1/12/2023 [address] US AT [facility]       Social History:  Social History     Socioeconomic History    Marital status: Single     Spouse name: None    Number of children: None    Years of education: None    Highest education level: None   Tobacco Use    Smoking status: Former     Types: Cigarettes    Smokeless tobacco: Never     Social Determinants of Health     Financial Resource Strain: Low Risk     Difficulty of Paying Living Expenses: Not very hard   Food Insecurity: No Food Insecurity    Worried About Running Out of Food in the Last Year: Never true    Michael of Food in the Last Year: Never true   Transportation Needs: Unknown    Lack of Transportation (Non-Medical): No   Housing Stability: Unknown    Unstable Housing in the Last Year: No       Family History:  History reviewed. No pertinent family history.     Hospital Outpatient Visit on 02/27/2023   Component Date Value Ref Range Status    WBC 02/27/2023 6.1  4.6 - 13.2 K/uL Final    RBC 02/27/2023 5.41 (H)  4.20 - 5.30 M/uL Final    Hemoglobin 02/27/2023 12.2  12.0 - 16.0 g/dL Final    Hematocrit 02/27/2023 39.6  35.0 - 45.0 % Final    MCV 02/27/2023 73.2 (L)  78.0 - 100.0 FL Final    MCH 02/27/2023 22.6 (L)  24.0 - 34.0 PG Final    MCHC 02/27/2023 30.8 (L)  31.0 - 37.0 g/dL Final    RDW 02/27/2023 17.4 (H)  11.6 - 14.5 % Final    Platelets 67/25/7956 258  135 - 420 K/uL Final    Nucleated

## 2023-05-09 ENCOUNTER — CLINICAL DOCUMENTATION (OUTPATIENT)
Age: 48
End: 2023-05-09

## 2023-05-10 ENCOUNTER — OFFICE VISIT (OUTPATIENT)
Facility: CLINIC | Age: 48
End: 2023-05-10
Payer: COMMERCIAL

## 2023-05-10 VITALS
BODY MASS INDEX: 28.35 KG/M2 | SYSTOLIC BLOOD PRESSURE: 127 MMHG | TEMPERATURE: 97.1 F | DIASTOLIC BLOOD PRESSURE: 78 MMHG | HEART RATE: 75 BPM | WEIGHT: 160 LBS | HEIGHT: 63 IN | OXYGEN SATURATION: 98 % | RESPIRATION RATE: 16 BRPM

## 2023-05-10 DIAGNOSIS — H61.23 BILATERAL IMPACTED CERUMEN: Primary | ICD-10-CM

## 2023-05-10 DIAGNOSIS — H53.9 VISION CHANGES: ICD-10-CM

## 2023-05-10 DIAGNOSIS — D05.11 INTRADUCTAL CARCINOMA IN SITU OF RIGHT BREAST: ICD-10-CM

## 2023-05-10 PROCEDURE — 99214 OFFICE O/P EST MOD 30 MIN: CPT | Performed by: NURSE PRACTITIONER

## 2023-05-10 PROCEDURE — 69210 REMOVE IMPACTED EAR WAX UNI: CPT | Performed by: NURSE PRACTITIONER

## 2023-08-17 ENCOUNTER — HOSPITAL ENCOUNTER (OUTPATIENT)
Dept: WOMENS IMAGING | Facility: HOSPITAL | Age: 48
Discharge: HOME | End: 2023-08-17
Attending: SURGERY
Payer: COMMERCIAL

## 2023-08-17 VITALS — BODY MASS INDEX: 27.29 KG/M2 | HEIGHT: 63 IN | WEIGHT: 154 LBS

## 2023-08-17 DIAGNOSIS — D05.11 DUCTAL CARCINOMA IN SITU (DCIS) OF RIGHT BREAST: ICD-10-CM

## 2023-08-17 PROCEDURE — G0279 TOMOSYNTHESIS, MAMMO: HCPCS

## 2023-11-06 ENCOUNTER — OFFICE VISIT (OUTPATIENT)
Age: 48
End: 2023-11-06
Payer: COMMERCIAL

## 2023-11-06 VITALS
OXYGEN SATURATION: 100 % | HEIGHT: 63 IN | TEMPERATURE: 97.7 F | SYSTOLIC BLOOD PRESSURE: 131 MMHG | DIASTOLIC BLOOD PRESSURE: 85 MMHG | RESPIRATION RATE: 16 BRPM | WEIGHT: 158 LBS | HEART RATE: 69 BPM | BODY MASS INDEX: 28 KG/M2

## 2023-11-06 DIAGNOSIS — D05.11 DUCTAL CARCINOMA IN SITU (DCIS) OF RIGHT BREAST: Primary | ICD-10-CM

## 2023-11-06 DIAGNOSIS — D24.2 FIBROADENOMA OF LEFT BREAST: ICD-10-CM

## 2023-11-06 PROCEDURE — 99214 OFFICE O/P EST MOD 30 MIN: CPT | Performed by: SURGERY

## 2023-11-06 ASSESSMENT — ENCOUNTER SYMPTOMS
CHEST TIGHTNESS: 0
SHORTNESS OF BREATH: 0

## 2023-11-06 NOTE — PROGRESS NOTES
CC:   Chief Complaint   Patient presents with    Follow-up     Right breast        Assessment:    ICD-10-CM    1. Ductal carcinoma in situ (DCIS) of right breast  D05.11 ALEXANDER DIGITAL DIAGNOSTIC W OR WO CAD BILATERAL      2. Fibroadenoma of left breast  D24.2           Plan: I reviewed the most recent mammogram from 8/2023 on the right which was benign. She is due for bilateral mammogram January 2024 which she states is already scheduled. I encouraged her to perform self breast exams monthly and notify us of any changes to her breasts including new pain, nipple discharge, lumps or skin changes. Continue to perform exercises and massage daily. I would like to see her back in 6 months or sooner should she have any questions or concerns. She agrees with this plan. HPI:  Ms. George Crawford here  with right DCIS, nuclear grade 2-3, ER/AL positive. The area of concern was identified on screening mammogram. There is family history of breast cancer in a maternal aunt. She underwent right breast localized lumpectomy and removal of left breast mass with reconstruction on 2/28/2023. She has healed well since surgery and states she completed her radiation in early July. She is doing well today with no complaints related to her breasts. Allergies: Allergies   Allergen Reactions    Tuna Oil [Fish Oil] Rash     Tuna fish only       Medication Review:  Current Outpatient Medications on File Prior to Visit   Medication Sig Dispense Refill    ibuprofen (ADVIL;MOTRIN) 800 MG tablet TAKE 1 TABLET BY MOUTH EVERY 8 HOURS AS NEEDED      acetaminophen (TYLENOL) 500 MG tablet Take 1 tablet by mouth every 6 hours as needed       No current facility-administered medications on file prior to visit. Systems Review:  Review of Systems   Constitutional:  Negative for fatigue, fever and unexpected weight change. Respiratory:  Negative for chest tightness and shortness of breath. Skin:  Negative for pallor, rash and wound.

## 2023-11-06 NOTE — PROGRESS NOTES
Joseline Villagomez is a 57 y.o. female (: 1975)     Chief Complaint   Patient presents with    Follow-up     Right breast       Medication list and allergies have been reviewed with Joslyn Smith and updated as of today's date. I have gone over all Medical, Surgical and Social History with Joslyn Smith and updated/added the information accordingly.

## 2024-02-05 ENCOUNTER — OFFICE VISIT (OUTPATIENT)
Age: 49
End: 2024-02-05
Payer: COMMERCIAL

## 2024-02-05 ENCOUNTER — TELEPHONE (OUTPATIENT)
Age: 49
End: 2024-02-05

## 2024-02-05 VITALS
TEMPERATURE: 98.1 F | BODY MASS INDEX: 28.53 KG/M2 | OXYGEN SATURATION: 100 % | HEART RATE: 78 BPM | RESPIRATION RATE: 16 BRPM | DIASTOLIC BLOOD PRESSURE: 68 MMHG | SYSTOLIC BLOOD PRESSURE: 116 MMHG | HEIGHT: 63 IN | WEIGHT: 161 LBS

## 2024-02-05 DIAGNOSIS — D05.11 DUCTAL CARCINOMA IN SITU (DCIS) OF RIGHT BREAST: Primary | ICD-10-CM

## 2024-02-05 DIAGNOSIS — D24.2 FIBROADENOMA OF LEFT BREAST: ICD-10-CM

## 2024-02-05 PROCEDURE — 99214 OFFICE O/P EST MOD 30 MIN: CPT | Performed by: SURGERY

## 2024-02-05 RX ORDER — TAMOXIFEN CITRATE 10 MG/1
1 TABLET ORAL
COMMUNITY
Start: 2023-09-01

## 2024-02-05 NOTE — PROGRESS NOTES
Joslyn Smith is a 48 y.o. female (: 1975)     Chief Complaint   Patient presents with    Follow-up     Right breast       Medication list and allergies have been reviewed with Joslyn Smith and updated as of today's date.     I have gone over all Medical, Surgical and Social History with Joslyn JOHNSON Luis and updated/added the information accordingly.

## 2024-02-06 ASSESSMENT — ENCOUNTER SYMPTOMS
CHEST TIGHTNESS: 0
SHORTNESS OF BREATH: 0

## 2024-02-06 NOTE — PROGRESS NOTES
CC:   Chief Complaint   Patient presents with    Follow-up     Right breast        Assessment:    ICD-10-CM    1. Ductal carcinoma in situ (DCIS) of right breast  D05.11 ALEXANDER JANUARY DIGITAL DIAGNOSTIC BILATERAL      2. Fibroadenoma of left breast  D24.2           Plan: She was due for mammogram in January which she did not complete. We will re order this for her and I will call her with results. I encouraged her to continue with self breast exams monthly and notify us of any changes including new skin changes, lumps, pain or nipple discharge. If she is doing well with normal mammogram then I would like to see her back in 6 months or sooner should she have any questions or concerns. She agrees with this plan.         HPI:  Ms. Joslyn Smith here  with right DCIS, nuclear grade 2-3, ER/WA positive. The area of concern was identified on screening mammogram. There is family history of breast cancer in a maternal aunt. She underwent right breast localized lumpectomy and removal of left breast mass with reconstruction on 2/28/2023. She has healed well since surgery and she completed her radiation in early July. She is doing well today with no complaints related to her breasts.       Allergies:  Allergies   Allergen Reactions    Tuna Oil [Fish Oil] Rash     Tuna fish only       Medication Review:  Current Outpatient Medications on File Prior to Visit   Medication Sig Dispense Refill    tamoxifen (NOLVADEX) 10 MG tablet Take 1 tablet by mouth      ibuprofen (ADVIL;MOTRIN) 800 MG tablet TAKE 1 TABLET BY MOUTH EVERY 8 HOURS AS NEEDED      acetaminophen (TYLENOL) 500 MG tablet Take 1 tablet by mouth every 6 hours as needed       No current facility-administered medications on file prior to visit.       Systems Review:  Review of Systems   Constitutional:  Negative for diaphoresis, fatigue, fever and unexpected weight change.   Respiratory:  Negative for chest tightness and shortness of breath.    Cardiovascular:  Negative for

## 2024-02-14 ENCOUNTER — HOSPITAL ENCOUNTER (OUTPATIENT)
Facility: HOSPITAL | Age: 49
Discharge: HOME | End: 2024-02-17
Attending: SURGERY
Payer: COMMERCIAL

## 2024-02-14 ENCOUNTER — HOSPITAL ENCOUNTER (OUTPATIENT)
Dept: WOMENS IMAGING | Facility: HOSPITAL | Age: 49
Discharge: HOME | End: 2024-02-17
Attending: SURGERY
Payer: COMMERCIAL

## 2024-02-14 DIAGNOSIS — D05.11 DUCTAL CARCINOMA IN SITU (DCIS) OF RIGHT BREAST: ICD-10-CM

## 2024-02-14 DIAGNOSIS — R92.8 ABNORMAL MAMMOGRAM: ICD-10-CM

## 2024-02-14 PROCEDURE — 77066 DX MAMMO INCL CAD BI: CPT

## 2024-02-14 PROCEDURE — 76642 ULTRASOUND BREAST LIMITED: CPT

## 2024-02-15 ENCOUNTER — TELEPHONE (OUTPATIENT)
Facility: HOSPITAL | Age: 49
End: 2024-02-15

## 2024-02-15 ENCOUNTER — OFFICE VISIT (OUTPATIENT)
Facility: CLINIC | Age: 49
End: 2024-02-15
Payer: COMMERCIAL

## 2024-02-15 VITALS
RESPIRATION RATE: 16 BRPM | DIASTOLIC BLOOD PRESSURE: 72 MMHG | SYSTOLIC BLOOD PRESSURE: 109 MMHG | WEIGHT: 160 LBS | OXYGEN SATURATION: 98 % | BODY MASS INDEX: 28.35 KG/M2 | TEMPERATURE: 97.7 F | HEIGHT: 63 IN | HEART RATE: 76 BPM

## 2024-02-15 DIAGNOSIS — Z85.3 HISTORY OF BREAST CANCER: ICD-10-CM

## 2024-02-15 DIAGNOSIS — M25.512 CHRONIC PAIN OF BOTH SHOULDERS: ICD-10-CM

## 2024-02-15 DIAGNOSIS — Z13.1 SCREENING FOR DIABETES MELLITUS (DM): Primary | ICD-10-CM

## 2024-02-15 DIAGNOSIS — Z13.220 SCREENING FOR CHOLESTEROL LEVEL: ICD-10-CM

## 2024-02-15 DIAGNOSIS — Z13.0 SCREENING FOR DEFICIENCY ANEMIA: ICD-10-CM

## 2024-02-15 DIAGNOSIS — M79.602 BILATERAL ARM PAIN: ICD-10-CM

## 2024-02-15 DIAGNOSIS — R92.8 ABNORMAL MAMMOGRAM OF LEFT BREAST: Primary | ICD-10-CM

## 2024-02-15 DIAGNOSIS — M79.601 BILATERAL ARM PAIN: ICD-10-CM

## 2024-02-15 DIAGNOSIS — Z13.29 SCREENING FOR THYROID DISORDER: ICD-10-CM

## 2024-02-15 DIAGNOSIS — Z13.228 SCREENING FOR METABOLIC DISORDER: ICD-10-CM

## 2024-02-15 DIAGNOSIS — D24.2 FIBROADENOMA OF LEFT BREAST: ICD-10-CM

## 2024-02-15 DIAGNOSIS — M25.511 CHRONIC PAIN OF BOTH SHOULDERS: ICD-10-CM

## 2024-02-15 DIAGNOSIS — G89.29 CHRONIC PAIN OF BOTH SHOULDERS: ICD-10-CM

## 2024-02-15 DIAGNOSIS — Z13.89 SCREENING FOR BLOOD OR PROTEIN IN URINE: ICD-10-CM

## 2024-02-15 PROCEDURE — 99214 OFFICE O/P EST MOD 30 MIN: CPT | Performed by: NURSE PRACTITIONER

## 2024-02-15 NOTE — PROGRESS NOTES
Joslyn Smith is a 48 y.o. female (: 1975) presenting to address:    Chief Complaint   Patient presents with    Follow-up       Vitals:    02/15/24 1506   BP: 109/72   Pulse: 76   Resp: 16   Temp: 97.7 °F (36.5 °C)   SpO2: 98%       Coordination of Care Questionaire:   1. \"Have you been to the ER, urgent care clinic since your last visit?  Hospitalized since your last visit?\" No     2. \"Have you seen or consulted any other health care providers outside of the [facility] since your last visit?\" No      3. For patients aged 45-75: Has the patient had a colonoscopy / FIT/ Cologuard? No      If the patient is female:    4. For patients aged 40-74: Has the patient had a mammogram within the past 2 years? yes      5. For patients aged 21-65: Has the patient had a pap smear?No  Advanced Directive:   1. Do you have an Advanced Directive? No     2. Would you like information on Advanced Directives? No

## 2024-02-15 NOTE — PROGRESS NOTES
Joslyn Smith is a 48 y.o. female  established patient, here for evaluation of the following chief complaint(s):  Chief Complaint   Patient presents with    Follow-up        Assessment and Plan  1. Screening for diabetes mellitus (DM)  -     Hemoglobin A1C; Future  2. Screening for blood or protein in urine  -     Urinalysis with Microscopic; Future  3. Screening for metabolic disorder  -     Comprehensive Metabolic Panel; Future  4. Screening for cholesterol level  -     Lipid Panel; Future  5. Screening for thyroid disorder  -     TSH + Free T4 Panel; Future  6. Screening for deficiency anemia  -     CBC with Auto Differential; Future  7. History of breast cancer  8. Fibroadenoma of left breast  9. Bilateral arm pain  10. Chronic pain of both shoulders     Return in about 1 month (around 3/15/2024) for NEEDS lab appt ASAP, results and exam, 30.   HPI:   In office visit. She works at [employer] in the [employer] and uses her hands, shoulders and back.      Pt reports random pain in her bilateral arms and shoulders, worse on left. This pain started in 2024. She has been ordered PT.   Pt needs a letter stating work restrictions. It is believed this pain resulted from breast cancer treatment w/ radiation.    ROS:    General: negative for - chills, fever, weight changes or malaise  HEENT: no sore throat, nasal congestion, vision problems or ear problems  Respiratory: no cough, shortness of breath, or wheezing  Cardiovascular: no chest pain, palpitations, or dyspnea on exertion  Gastrointestinal: no abdominal pain, N/V, change in bowel habits  Musculoskeletal: no back pain or joint pain  Neurological: no headache or dizziness  Endo:  No polyuria or polydipsia  : no urinary  Skin: none   Psychological: negative for - anxiety, depression, sleeps issues    Prior to Admission medications    Medication Sig Start Date End Date Taking? Authorizing Provider   tamoxifen (NOLVADEX) 10 MG tablet Take 1 tablet by mouth 9/1/23  Yes

## 2024-02-16 ENCOUNTER — HOSPITAL ENCOUNTER (OUTPATIENT)
Facility: HOSPITAL | Age: 49
Setting detail: SPECIMEN
End: 2024-02-16
Payer: COMMERCIAL

## 2024-02-16 DIAGNOSIS — Z13.0 SCREENING FOR DEFICIENCY ANEMIA: ICD-10-CM

## 2024-02-16 DIAGNOSIS — Z13.228 SCREENING FOR METABOLIC DISORDER: ICD-10-CM

## 2024-02-16 DIAGNOSIS — Z13.1 SCREENING FOR DIABETES MELLITUS (DM): ICD-10-CM

## 2024-02-16 DIAGNOSIS — Z13.89 SCREENING FOR BLOOD OR PROTEIN IN URINE: ICD-10-CM

## 2024-02-16 DIAGNOSIS — Z13.220 SCREENING FOR CHOLESTEROL LEVEL: ICD-10-CM

## 2024-02-16 DIAGNOSIS — Z13.29 SCREENING FOR THYROID DISORDER: ICD-10-CM

## 2024-02-16 LAB
ALBUMIN SERPL-MCNC: 3.3 G/DL (ref 3.4–5)
ALBUMIN/GLOB SERPL: 0.9 (ref 0.8–1.7)
ALP SERPL-CCNC: 55 U/L (ref 45–117)
ALT SERPL-CCNC: 20 U/L (ref 13–56)
ANION GAP SERPL CALC-SCNC: 2 MMOL/L (ref 3–18)
APPEARANCE UR: CLEAR
AST SERPL-CCNC: 11 U/L (ref 10–38)
BACTERIA URNS QL MICRO: ABNORMAL /HPF
BASOPHILS # BLD: 0 K/UL (ref 0–0.1)
BASOPHILS NFR BLD: 0 % (ref 0–2)
BILIRUB SERPL-MCNC: 0.3 MG/DL (ref 0.2–1)
BILIRUB UR QL: NEGATIVE
BUN SERPL-MCNC: 10 MG/DL (ref 7–18)
BUN/CREAT SERPL: 14 (ref 12–20)
CALCIUM SERPL-MCNC: 9.1 MG/DL (ref 8.5–10.1)
CHLORIDE SERPL-SCNC: 111 MMOL/L (ref 100–111)
CHOLEST SERPL-MCNC: 149 MG/DL
CO2 SERPL-SCNC: 29 MMOL/L (ref 21–32)
COLOR UR: YELLOW
CREAT SERPL-MCNC: 0.69 MG/DL (ref 0.6–1.3)
DIFFERENTIAL METHOD BLD: ABNORMAL
EOSINOPHIL # BLD: 0.1 K/UL (ref 0–0.4)
EOSINOPHIL NFR BLD: 2 % (ref 0–5)
EPITH CASTS URNS QL MICRO: ABNORMAL /LPF (ref 0–5)
ERYTHROCYTE [DISTWIDTH] IN BLOOD BY AUTOMATED COUNT: 18.3 % (ref 11.6–14.5)
EST. AVERAGE GLUCOSE BLD GHB EST-MCNC: 117 MG/DL
GLOBULIN SER CALC-MCNC: 3.6 G/DL (ref 2–4)
GLUCOSE SERPL-MCNC: 85 MG/DL (ref 74–99)
GLUCOSE UR STRIP.AUTO-MCNC: NEGATIVE MG/DL
HBA1C MFR BLD: 5.7 % (ref 4.2–5.6)
HCT VFR BLD AUTO: 40.7 % (ref 35–45)
HDLC SERPL-MCNC: 50 MG/DL (ref 40–60)
HDLC SERPL: 3 (ref 0–5)
HGB BLD-MCNC: 12.6 G/DL (ref 12–16)
HGB UR QL STRIP: ABNORMAL
IMM GRANULOCYTES # BLD AUTO: 0 K/UL (ref 0–0.04)
IMM GRANULOCYTES NFR BLD AUTO: 0 % (ref 0–0.5)
KETONES UR QL STRIP.AUTO: NEGATIVE MG/DL
LDLC SERPL CALC-MCNC: 85.8 MG/DL (ref 0–100)
LEUKOCYTE ESTERASE UR QL STRIP.AUTO: ABNORMAL
LIPID PANEL: NORMAL
LYMPHOCYTES # BLD: 1.7 K/UL (ref 0.9–3.6)
LYMPHOCYTES NFR BLD: 34 % (ref 21–52)
MCH RBC QN AUTO: 22.7 PG (ref 24–34)
MCHC RBC AUTO-ENTMCNC: 31 G/DL (ref 31–37)
MCV RBC AUTO: 73.2 FL (ref 78–100)
MONOCYTES # BLD: 0.4 K/UL (ref 0.05–1.2)
MONOCYTES NFR BLD: 8 % (ref 3–10)
NEUTS SEG # BLD: 2.7 K/UL (ref 1.8–8)
NEUTS SEG NFR BLD: 55 % (ref 40–73)
NITRITE UR QL STRIP.AUTO: NEGATIVE
NRBC # BLD: 0 K/UL (ref 0–0.01)
NRBC BLD-RTO: 0 PER 100 WBC
PH UR STRIP: 5.5 (ref 5–8)
PLATELET # BLD AUTO: 251 K/UL (ref 135–420)
POTASSIUM SERPL-SCNC: 4.2 MMOL/L (ref 3.5–5.5)
PROT SERPL-MCNC: 6.9 G/DL (ref 6.4–8.2)
PROT UR STRIP-MCNC: NEGATIVE MG/DL
RBC # BLD AUTO: 5.56 M/UL (ref 4.2–5.3)
RBC #/AREA URNS HPF: NEGATIVE /HPF (ref 0–5)
SODIUM SERPL-SCNC: 142 MMOL/L (ref 136–145)
SP GR UR REFRACTOMETRY: 1.02 (ref 1–1.03)
T4 FREE SERPL-MCNC: 0.8 NG/DL (ref 0.7–1.5)
TRIGL SERPL-MCNC: 66 MG/DL
TSH SERPL DL<=0.05 MIU/L-ACNC: 3.11 UIU/ML (ref 0.36–3.74)
UROBILINOGEN UR QL STRIP.AUTO: 0.2 EU/DL (ref 0.2–1)
VLDLC SERPL CALC-MCNC: 13.2 MG/DL
WBC # BLD AUTO: 4.8 K/UL (ref 4.6–13.2)
WBC URNS QL MICRO: ABNORMAL /HPF (ref 0–4)

## 2024-02-16 PROCEDURE — 85025 COMPLETE CBC W/AUTO DIFF WBC: CPT

## 2024-02-16 PROCEDURE — 83036 HEMOGLOBIN GLYCOSYLATED A1C: CPT

## 2024-02-16 PROCEDURE — 84439 ASSAY OF FREE THYROXINE: CPT

## 2024-02-16 PROCEDURE — 36415 COLL VENOUS BLD VENIPUNCTURE: CPT

## 2024-02-16 PROCEDURE — 80061 LIPID PANEL: CPT

## 2024-02-16 PROCEDURE — 80053 COMPREHEN METABOLIC PANEL: CPT

## 2024-02-16 PROCEDURE — 81001 URINALYSIS AUTO W/SCOPE: CPT

## 2024-02-16 PROCEDURE — 84443 ASSAY THYROID STIM HORMONE: CPT

## 2024-02-21 ENCOUNTER — OFFICE VISIT (OUTPATIENT)
Age: 49
End: 2024-02-21
Payer: COMMERCIAL

## 2024-02-21 VITALS — BODY MASS INDEX: 28.34 KG/M2 | RESPIRATION RATE: 16 BRPM | HEIGHT: 63 IN

## 2024-02-21 DIAGNOSIS — M75.21 BICEPS TENDINITIS OF RIGHT SHOULDER: ICD-10-CM

## 2024-02-21 DIAGNOSIS — M75.102 ROTATOR CUFF SYNDROME, LEFT: ICD-10-CM

## 2024-02-21 DIAGNOSIS — M75.22 BICEPS TENDINITIS OF LEFT SHOULDER: ICD-10-CM

## 2024-02-21 DIAGNOSIS — M75.101 ROTATOR CUFF SYNDROME, RIGHT: Primary | ICD-10-CM

## 2024-02-21 PROCEDURE — 99204 OFFICE O/P NEW MOD 45 MIN: CPT | Performed by: ORTHOPAEDIC SURGERY

## 2024-02-21 NOTE — PROGRESS NOTES
[facility] AMBULATORY OFFICE NOTE      Patient: Joslyn Smith                MRN: [id number]       SSN: [id number]  YOB: 1975        AGE: 48 y.o.        SEX: female  Body mass index is 28.34 kg/m².    PCP: Marcie Cabral APRN - CNP  02/21/24    CHIEF COMPLAINT: Bilateral shoulder pain    HPI: Joslyn Smith is a 48 y.o. female patient who complains of bilateral shoulder pain.  She has been having pain for almost the last year.  She was diagnosed with breast cancer and she has had surgery in both breasts which was lumpectomies.  She also had radiation.  She has been noticing some increasing shoulder pain with use of the arms especially at work and at night.  She has not had any specific treatment.  She will occasionally take ibuprofen for headaches but not for her shoulders.  Pain is worse in the left shoulder than the right.  She has had some physical therapy for the right shoulder.  None for the left.    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       Allergies   Allergen Reactions    Tuna Oil [Fish Oil] Rash     Tuna fish only       Past Surgical History:   Procedure Laterality Date    BREAST BIOPSY Bilateral 02/28/2023    Right breast localized lumpectomy, Left breast excision of mass performed by Lili Golden DO at [facility] MAIN OR    BREAST LUMPECTOMY Right 02/28/2023    RT. DCIS    BREAST RECONSTRUCTION Bilateral 02/28/2023    right breast oncoplastic

## 2024-03-05 ENCOUNTER — TELEPHONE (OUTPATIENT)
Age: 49
End: 2024-03-05

## 2024-03-05 ENCOUNTER — TELEPHONE (OUTPATIENT)
Facility: CLINIC | Age: 49
End: 2024-03-05

## 2024-03-05 NOTE — TELEPHONE ENCOUNTER
[facility] is requesting to have the patient last office note sent over       Phone  [phone]  Fax  [phone]

## 2024-03-05 NOTE — TELEPHONE ENCOUNTER
Patient came into the office to request [facility] paperwork be filled out for her employer. Patient was advised that paperwork is usually scheduled with an appointment. She states that her appointment on 3/22/2024 is too long to wait for the paperwork to be submitted to her employer and they need it back ASAP. Please advise, thank you.    Call back: [phone]

## 2024-03-06 NOTE — TELEPHONE ENCOUNTER
See if she can come in tomorrow morning at 730 to do her [place] paperwork.  If she can she has to be on time.

## 2024-03-07 ENCOUNTER — OFFICE VISIT (OUTPATIENT)
Facility: CLINIC | Age: 49
End: 2024-03-07
Payer: COMMERCIAL

## 2024-03-07 VITALS
WEIGHT: 160.8 LBS | BODY MASS INDEX: 28.48 KG/M2 | DIASTOLIC BLOOD PRESSURE: 82 MMHG | HEART RATE: 81 BPM | SYSTOLIC BLOOD PRESSURE: 120 MMHG | TEMPERATURE: 97.9 F | OXYGEN SATURATION: 98 %

## 2024-03-07 DIAGNOSIS — M75.22 BICEPS TENDINITIS OF BOTH SHOULDERS: ICD-10-CM

## 2024-03-07 DIAGNOSIS — R73.03 PREDIABETES: ICD-10-CM

## 2024-03-07 DIAGNOSIS — M75.21 BICEPS TENDINITIS OF BOTH SHOULDERS: ICD-10-CM

## 2024-03-07 DIAGNOSIS — G89.29 CHRONIC PAIN OF BOTH SHOULDERS: ICD-10-CM

## 2024-03-07 DIAGNOSIS — Z02.9 ADMINISTRATIVE ENCOUNTER: Primary | ICD-10-CM

## 2024-03-07 DIAGNOSIS — M75.102 ROTATOR CUFF SYNDROME, LEFT: ICD-10-CM

## 2024-03-07 DIAGNOSIS — M25.511 CHRONIC PAIN OF BOTH SHOULDERS: ICD-10-CM

## 2024-03-07 DIAGNOSIS — Z85.3 HISTORY OF BREAST CANCER: ICD-10-CM

## 2024-03-07 DIAGNOSIS — M75.101 ROTATOR CUFF SYNDROME OF RIGHT SHOULDER: ICD-10-CM

## 2024-03-07 DIAGNOSIS — M25.512 CHRONIC PAIN OF BOTH SHOULDERS: ICD-10-CM

## 2024-03-07 PROCEDURE — 99215 OFFICE O/P EST HI 40 MIN: CPT | Performed by: NURSE PRACTITIONER

## 2024-03-07 NOTE — PROGRESS NOTES
Joslyn Smith is a 48 y.o. female  established patient, here for evaluation of the following chief complaint(s):  Chief Complaint   Patient presents with    Follow-up     Patient needs FMLA and short-term disability completed related to her bilateral shoulder and arm pain        Assessment and Plan  1. Administrative encounter  2. Chronic pain of both shoulders  3. History of breast cancer  4. Rotator cuff syndrome of right shoulder  5. Rotator cuff syndrome, left  6. Biceps tendinitis of both shoulders  7. Prediabetes       Return in about 1 month (around 4/7/2024) for Routine, 20.   HPI:   In office visit.  Patient is requesting FMLA and short term disability paperwork related to bilateral arms and shoulders pain. This pain started in 2024. It is believed this pain resulted from breast cancer treatment w/ radiation and related surgery of both breast.  Patient is seeing orthopedics/shoulder specialist with Dr. Lamb for bilateral rotator cuff syndrome and bilateral tendinitis of her bicep muscle.  Patient is starting physical therapy for the above conditions.    Pt is on short term disability now 3/5/2024-3/12/224 then 3/13/2024-5/20/2024    Prediabetes, 2/16/2024 A1c 5.7  ROS:    General: negative for - chills, fever, weight changes or malaise  HEENT: no sore throat, nasal congestion, vision problems or ear problems  Respiratory: no cough, shortness of breath, or wheezing  Cardiovascular: no chest pain, palpitations, or dyspnea on exertion  Gastrointestinal: no abdominal pain, N/V, change in bowel habits  Musculoskeletal: See HPI  Neurological: no headache or dizziness  Endo:  No polyuria or polydipsia  : no urinary  Skin: none   Psychological: negative for - anxiety, depression, sleeps issues    Prior to Admission medications    Medication Sig Start Date End Date Taking? Authorizing Provider   tamoxifen (NOLVADEX) 10 MG tablet Take 1 tablet by mouth 9/1/23   Provider, MD Caroline   ibuprofen

## 2024-03-08 ENCOUNTER — HOSPITAL ENCOUNTER (OUTPATIENT)
Facility: HOSPITAL | Age: 49
Setting detail: RECURRING SERIES
Discharge: HOME | End: 2024-03-11
Payer: COMMERCIAL

## 2024-03-08 PROCEDURE — 97161 PT EVAL LOW COMPLEX 20 MIN: CPT

## 2024-03-08 ASSESSMENT — PATIENT HEALTH QUESTIONNAIRE - PHQ9
SUM OF ALL RESPONSES TO PHQ QUESTIONS 1-9: 0
1. LITTLE INTEREST OR PLEASURE IN DOING THINGS: 0
SUM OF ALL RESPONSES TO PHQ QUESTIONS 1-9: 0
SUM OF ALL RESPONSES TO PHQ QUESTIONS 1-9: 0
SUM OF ALL RESPONSES TO PHQ9 QUESTIONS 1 & 2: 0
2. FEELING DOWN, DEPRESSED OR HOPELESS: 0
SUM OF ALL RESPONSES TO PHQ QUESTIONS 1-9: 0

## 2024-03-08 NOTE — PROGRESS NOTES
PHYSICAL / OCCUPATIONAL THERAPY - DAILY TREATMENT NOTE (updated )    Patient Name: Joslyn Smith    Date: 3/8/2024    : 1975  [facility] / Plan: [employer] BETTER [facility] / Product Type: *No Product type* /      Patient  verified Yes     Visit #   Current / Total 1 16   Time   In / Out 2:25 3:13   Pain   In / Out 7 7   Subjective Functional Status/Changes: See POC   Changes to:  Meds, Allergies, Med Hx, Sx Hx?  If yes, update Summary List no       TREATMENT AREA =  Bilateral shoulder pain [M25.511, M25.512]    OBJECTIVE    24 min   Eval - untimed                      Therapeutic Procedures:  Tx Min Billable or 1:1 Min (if diff from Tx Min) Procedure, Rationale, Specifics   12 0 55490 Therapeutic Exercise (timed):  increase ROM, strength, coordination, balance, and proprioception to improve patient's ability to progress to PLOF and address remaining functional goals. (see flow sheet as applicable)     Details if applicable:  HEP instruction and demonstration     12 0 51575 Self Care/Home Management (timed):  improve patient knowledge and understanding of pain reducing techniques, positioning, posture/ergonomics, home safety, activity modification, diagnosis/prognosis, and physical therapy expectations, procedures and progression  to improve patient's ability to progress to PLOF and address remaining functional goals.  (see flow sheet as applicable)     Details if applicable:  pt education on relevant anatomy/physiology    24 0 [facility] Totals Reminder: bill using total billable min of TIMED therapeutic procedures (example: do not include dry needle or estim unattended, both untimed codes, in totals to left)  8-22 min = 1 unit; 23-37 min = 2 units; 38-52 min = 3 units; 53-67 min = 4 units; 68-82 min = 5 units   Total Total     TOTAL TREATMENT TIME:        24     [x]  Patient Education billed concurrently with other procedures   [x] Review HEP    [] Progressed/Changed HEP,

## 2024-03-08 NOTE — PROGRESS NOTES
[facility] PHYSICAL THERAPY  [address] [place] [id number] Phone: [phone] Fax   Plan of Care / Statement of Necessity for Physical Therapy Services     Patient Name: Joslyn Smith : 1975   Medical   Diagnosis: Bilateral shoulder pain [M25.511, M25.512] Treatment Diagnosis: M25.511  RIGHT SHOULDER PAIN and M25.512  LEFT SHOULDER PAIN      Onset Date: Initial 2023 Payor :  Payor: [facility] / Plan: [facility] / Product Type: *No Product type* /    Referral Source: Julio César Cast MD Start of Care (SOC): 3/8/2024   Prior Hospitalization: See medical history Provider #: [id number]   Prior Level of Function: Independent with ADLs, work, functional, and recreational activities.    Comorbidities: Breast cancer with radiation on the right breast, B breast lumpectomy and reconstruction, arthritis, back pain.     Assessment / key information:    Pt is a 48 year old female who presents to therapy today with B shoulder pain. Pt had B breast surgery/reconstruction in 2023. She had surgery on the right shoulder late last year and noticed improvements in her symptoms. 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 Her pain affects her ability to don a seat belt and can affect sleeping. Pt denies numbness/tingling in the B arms. Pt demonstrated decreased AROM, decreased strength, muscle tightness, tenderness to palpation, and impaired posture. Possible cording noted on the left>right pec/chest region with hypersensitivity to touch. Pt would benefit from physical therapy to improve the above impairments to help the pt return to performing ADLs, functional and recreational activities.    Evaluation Complexity:  History:  HIGH Complexity :3+ comorbidities / personal factors will impact the outcome/ POC ; Examination:  MEDIUM Complexity : 3 Standardized tests and measures

## 2024-03-22 ENCOUNTER — OFFICE VISIT (OUTPATIENT)
Facility: CLINIC | Age: 49
End: 2024-03-22
Payer: COMMERCIAL

## 2024-03-22 VITALS
HEART RATE: 82 BPM | DIASTOLIC BLOOD PRESSURE: 81 MMHG | RESPIRATION RATE: 19 BRPM | TEMPERATURE: 97.9 F | OXYGEN SATURATION: 98 % | BODY MASS INDEX: 28.35 KG/M2 | HEIGHT: 63 IN | WEIGHT: 160 LBS | SYSTOLIC BLOOD PRESSURE: 112 MMHG

## 2024-03-22 DIAGNOSIS — M25.512 CHRONIC PAIN OF BOTH SHOULDERS: ICD-10-CM

## 2024-03-22 DIAGNOSIS — Z02.9 ADMINISTRATIVE ENCOUNTER: Primary | ICD-10-CM

## 2024-03-22 DIAGNOSIS — Z85.3 HISTORY OF BREAST CANCER: ICD-10-CM

## 2024-03-22 DIAGNOSIS — M75.102 ROTATOR CUFF SYNDROME, LEFT: ICD-10-CM

## 2024-03-22 DIAGNOSIS — M75.21 BICEPS TENDINITIS OF BOTH SHOULDERS: ICD-10-CM

## 2024-03-22 DIAGNOSIS — Z86.2 HISTORY OF IRON DEFICIENCY ANEMIA: ICD-10-CM

## 2024-03-22 DIAGNOSIS — M75.22 BICEPS TENDINITIS OF BOTH SHOULDERS: ICD-10-CM

## 2024-03-22 DIAGNOSIS — M75.101 ROTATOR CUFF SYNDROME, RIGHT: ICD-10-CM

## 2024-03-22 DIAGNOSIS — M25.511 CHRONIC PAIN OF BOTH SHOULDERS: ICD-10-CM

## 2024-03-22 DIAGNOSIS — G89.29 CHRONIC PAIN OF BOTH SHOULDERS: ICD-10-CM

## 2024-03-22 PROCEDURE — 99214 OFFICE O/P EST MOD 30 MIN: CPT | Performed by: NURSE PRACTITIONER

## 2024-03-22 NOTE — PROGRESS NOTES
Joslyn Smith is a 48 y.o. female  established patient, here for evaluation of the following chief complaint(s):  Chief Complaint   Patient presents with    Follow-up      Assessment and Plan  1. Administrative encounter  2. Chronic pain of both shoulders  3. History of breast cancer  4. Biceps tendinitis of both shoulders  5. Rotator cuff syndrome, left  6. Rotator cuff syndrome, right  7. History of iron deficiency anemia       Return in about 1 month (around 4/22/2024) for work and shoulder pan follow-up, 15.   HPI:   In office visit. Pt has talked to HR w/ her company. [employer] has questions about the FMLA and short term disability.paperwork.   She is picking at work now. She is not lifting. She has this accomodation until 5/20/2024.     Follow-up related to FMLA paperwork, short-term disability paperwork related to bilateral arm and shoulder pain. This pain started in 2024. It is believed this pain resulted from breast cancer treatment w/ radiation and related surgery of both breast.    Patient is seeing orthopedics/shoulder specialist with Dr. aCst for bilateral rotator cuff syndrome and bilateral tendinitis of her bicep muscle.  Patient has started physical therapy for the above conditions.    Pt has a h/o iron anemia. She takes iron daily.     H/O prediabetes     ROS:    General: negative for - chills, fever, weight changes or malaise  HEENT: no sore throat, nasal congestion, vision problems or ear problems  Respiratory: no cough, shortness of breath, or wheezing  Cardiovascular: no chest pain, palpitations, or dyspnea on exertion  Gastrointestinal: no abdominal pain, N/V, change in bowel habits  Musculoskeletal: see HPI  Neurological: no headache or dizziness  Endo:  No polyuria or polydipsia  : no urinary  Skin: none   Psychological: negative for - anxiety, depression, sleeps issues    Prior to Admission medications    Medication Sig Start Date End Date Taking? Authorizing Provider   tamoxifen

## 2024-03-22 NOTE — PROGRESS NOTES
Joslyn Smith is a 48 y.o. year old female who presents today for   Chief Complaint   Patient presents with    Follow-up       Is someone accompanying this pt? no    Is the patient using any DME equipment during OV? no    Depression Screening:       3/8/2024     6:45 AM 2/15/2024     3:07 PM 2023     3:11 PM   PHQ-9 Questionaire   Little interest or pleasure in doing things 0 0 0   Feeling down, depressed, or hopeless 0 0 0   PHQ-9 Total Score 0 0 0       Abuse Screenin/3/2023     1:00 PM   AMB Abuse Screening   Do you ever feel afraid of your partner? N   Are you in a relationship with someone who physically or mentally threatens you? N   Is it safe for you to go home? Y       Learning Assessment:  No question data found.    Fall Risk:      5/3/2023     1:27 PM   Fall Risk   2 or more falls in past year? no   Fall with injury in past year? no           Coordination of Care:   1. \"Have you been to the ER, urgent care clinic since your last visit?  Hospitalized since your last visit?\" no    2. \"Have you seen or consulted any other health care providers outside of the [facility] since your last visit?\" no    3. For patients aged 45-75: Has the patient had a colonoscopy / FIT/ Cologuard? no    If the patient is female:    4. For patients aged 40-74: Has the patient had a mammogram within the past 2 years? yes    5. For patients aged 21-65: Has the patient had a pap smear? no    Health Maintenance: reviewed and discussed and ordered per Provider.    Health Maintenance Due   Topic Date Due    Hepatitis B vaccine (1 of 3 - 3-dose series) Never done    Colorectal Cancer Screen  Never done    Flu vaccine (1) 2023    COVID-19 Vaccine (2023- season) 2023        -Kasey Peralta CMA  [facility]  Phone: [phone]  Fax: [phone]

## 2024-04-04 ENCOUNTER — HOSPITAL ENCOUNTER (OUTPATIENT)
Facility: HOSPITAL | Age: 49
Setting detail: RECURRING SERIES
Discharge: HOME | End: 2024-04-07
Payer: COMMERCIAL

## 2024-04-04 PROCEDURE — 97530 THERAPEUTIC ACTIVITIES: CPT

## 2024-04-04 PROCEDURE — 97112 NEUROMUSCULAR REEDUCATION: CPT

## 2024-04-04 PROCEDURE — 97110 THERAPEUTIC EXERCISES: CPT

## 2024-04-04 NOTE — PROGRESS NOTES
PHYSICAL / OCCUPATIONAL THERAPY - DAILY TREATMENT NOTE (updated )    Patient Name: Joslyn Smith    Date: 2024    : 1975  Insurance: Payor: [employer] BETTER [facility] / Plan: [employer] BETTER [facility] / Product Type: *No Product type* /      Patient  verified yes     Visit #   Current / Total 2 10 Total Time   Time   In / Out 9:52 10:34 42   Pain   In / Out 7 7    Subjective Functional Status/Changes: I have been working on my exercises at home.     TREATMENT AREA =  Right shoulder pain [M25.511]  Left shoulder pain [M25.512]    OBJECTIVE      Therapeutic Procedures:  42  Total  MC BC Totals Reminder: bill using total billable min of TIMED therapeutic procedures (example: do not include dry needle or estim unattended, both untimed codes, in totals to left)  8-22 min = 1 unit; 23-37 min = 2 units; 38-52 min = 3 units; 53-67 min = 4 units; 68-82 min = 5 units   Tx Min  Procedure, Rationale, Specifics   12  64203 Therapeutic Exercise (timed):  increase ROM, strength, coordination, balance, and proprioception to improve patient's ability to progress to PLOF and address remaining functional goals. (see flow sheet as applicable)   Details if applicable:       14  02134 Therapeutic Activity (timed):  use of dynamic activities replicating functional movements to increase ROM, strength, coordination, balance, and proprioception in order to improve patient's ability to progress to PLOF and address remaining functional goals.  (see flow sheet as applicable)   Details if applicable:       16  86531 Neuromuscular Re-Education (timed):  improve balance, coordination, kinesthetic sense, posture, core stability and proprioception to improve patient's ability to develop conscious control of individual muscles and awareness of position of extremities in order to progress to PLOF and address remaining functional goals. (see flow sheet as applicable)     Details if applicable:           [x]  Patient Education billed

## 2024-04-05 ENCOUNTER — HOSPITAL ENCOUNTER (OUTPATIENT)
Facility: HOSPITAL | Age: 49
Setting detail: RECURRING SERIES
Discharge: HOME | End: 2024-04-08
Payer: COMMERCIAL

## 2024-04-05 PROCEDURE — 97110 THERAPEUTIC EXERCISES: CPT

## 2024-04-05 PROCEDURE — 97530 THERAPEUTIC ACTIVITIES: CPT

## 2024-04-05 PROCEDURE — 97112 NEUROMUSCULAR REEDUCATION: CPT

## 2024-04-05 NOTE — PROGRESS NOTES
PHYSICAL / OCCUPATIONAL THERAPY - DAILY TREATMENT NOTE (updated )    Patient Name: Joslyn Smith    Date: 2024    : 1975  Insurance: Payor: [facility] / Plan: [employer] BETTER [facility] / Product Type: *No Product type* /      Patient  verified yes     Visit #   Current / Total 3 10 Total Time   Time   In / Out 10:25 am 11:16 am 51   Pain   In / Out 6/10 5/10    Subjective Functional Status/Changes: Patient notes having difficulty reaching overhead.     TREATMENT AREA =  Right shoulder pain [M25.511]  Left shoulder pain [M25.512]    OBJECTIVE      Therapeutic Procedures:  51  Total   BC Totals Reminder: bill using total billable min of TIMED therapeutic procedures (example: do not include dry needle or estim unattended, both untimed codes, in totals to left)  8-22 min = 1 unit; 23-37 min = 2 units; 38-52 min = 3 units; 53-67 min = 4 units; 68-82 min = 5 units   Tx Min  Procedure, Rationale, Specifics   16  50787 Therapeutic Exercise (timed):  increase ROM, strength, coordination, balance, and proprioception to improve patient's ability to progress to PLOF and address remaining functional goals. (see flow sheet as applicable)   Details if applicable:       16  41170 Therapeutic Activity (timed):  use of dynamic activities replicating functional movements to increase ROM, strength, coordination, balance, and proprioception in order to improve patient's ability to progress to PLOF and address remaining functional goals.  (see flow sheet as applicable)   Details if applicable:       19  92070 Neuromuscular Re-Education (timed):  improve balance, coordination, kinesthetic sense, posture, core stability and proprioception to improve patient's ability to develop conscious control of individual muscles and awareness of position of extremities in order to progress to PLOF and address remaining functional goals. (see flow sheet as applicable)     Details if applicable:           [x]

## 2024-04-11 ENCOUNTER — HOSPITAL ENCOUNTER (OUTPATIENT)
Facility: HOSPITAL | Age: 49
Setting detail: RECURRING SERIES
Discharge: HOME | End: 2024-04-14
Payer: COMMERCIAL

## 2024-04-11 PROCEDURE — 97110 THERAPEUTIC EXERCISES: CPT

## 2024-04-11 PROCEDURE — 97530 THERAPEUTIC ACTIVITIES: CPT

## 2024-04-11 PROCEDURE — 97112 NEUROMUSCULAR REEDUCATION: CPT

## 2024-04-12 ENCOUNTER — HOSPITAL ENCOUNTER (OUTPATIENT)
Facility: HOSPITAL | Age: 49
Setting detail: RECURRING SERIES
Discharge: HOME | End: 2024-04-15
Payer: COMMERCIAL

## 2024-04-12 PROCEDURE — 97110 THERAPEUTIC EXERCISES: CPT

## 2024-04-12 PROCEDURE — 97112 NEUROMUSCULAR REEDUCATION: CPT

## 2024-04-12 PROCEDURE — 97530 THERAPEUTIC ACTIVITIES: CPT

## 2024-04-12 NOTE — PROGRESS NOTES
PHYSICAL / OCCUPATIONAL THERAPY - DAILY TREATMENT NOTE (updated )    Patient Name: Joslyn Smith    Date: 2024    : 1975  Insurance: Payor: [employer] [facility] / Plan: [employer] [facility] / Product Type: *No Product type* /      Patient  verified yes     Visit #   Current / Total 4 10 Total Time   Time   In / Out 3:02 pm 3:58 pm 56   Pain   In / Out 6/10 5/10    Subjective Functional Status/Changes: Patient reports increased pain in her upper back today.     TREATMENT AREA =  Right shoulder pain [M25.511]  Left shoulder pain [M25.512]    OBJECTIVE  Modalities Rationale:     decrease edema to improve patient's ability to progress to PLOF and address remaining functional goals.     min [] Estim Unattended, type/location:                                      []  w/ice    []  w/heat    min [] Estim Attended, type/location:                                     []  w/US     []  w/ice    []  w/heat    []  TENS insruct      min []  Mechanical Traction: type/lbs                   []  pro   []  sup   []  int   []  cont    []  before manual    []  after manual    min []  Ultrasound, settings/location:     10 min  unbill []  Ice     [x]  Heat    location/position: T/S; patient semi-reclined    min []  Paraffin,  details:     min []  Vasopneumatic Device, press/temp:     min []  Whirlpool / Fluido:    If using vaso (only need to measure limb vaso being performed on)      pre-treatment girth :       post-treatment girth :       measured at (landmark location) :      min []  Other:    Skin assessment post-treatment:   Intact      Therapeutic Procedures:  46  Total  MC BC Totals Reminder: bill using total billable min of TIMED therapeutic procedures (example: do not include dry needle or estim unattended, both untimed codes, in totals to left)  8-22 min = 1 unit; 23-37 min = 2 units; 38-52 min = 3 units; 53-67 min = 4 units; 68-82 min = 5 units   Tx Min  Procedure, Rationale, Specifics   16  [id number] 
To be accomplished in 16 treatments  Pt will increase FOTO score to 60 points to improve ability to perform ADLs.  Status at last note/certification: 42 points  Current: goal to be assessed at next session  2.  Pt will increase AROM left shoulder flex to WNL in sitting, ABD to 145 degs in sitting, ER to 65 degs, IR to 45 degs to improve ability to tolerate donning a seat belt.  Status at last note/certification: flex 160 degs in sitting,  degs in sitting, ER 50 degs, IR 30 degs, all with increased pain in the left shoulder.  Current: goal progressing; AROM as above  3.  Pt will increase AROM right shoulder flex to 160 degs in sitting, ABD to 130 degs in sitting to improve ability to reach with less pain.  Status at last note/certification: flex 145 degs in sitting,  degs in sitting with pain  Current: goal progressing; flexion 145 deg, scaption 135 deg in sitting with pain  4.  Pt will report having mild to no increased pain with sleeping on her sides to improve sleep quality.  Status at last note/certification: pain with laying on either side  Current: goal not met; pain with laying on either side  5. Pt will report an improvement in at best pain to 2/10 to improve tolerance to work activities.   Status at last note/certification: 7/10  Current: goal not met; 6/10 pain at best    New Goals to be achieved in __4__ WEEKS  Continue per established goals.    Frequency / Duration:   Patient to be seen   1-2   times per week for   4    WEEKS    RECOMMENDATIONS  We would like to continue therapy for progress to goals stated above.  Continue per initial Plan of Care.    If you have any questions/comments please contact us directly.  Thank you for allowing us to assist in the care of your patient.    Lc Yun, PTA       4/11/2024       4:23 PM  Colton Isaacs, PT

## 2024-04-12 NOTE — PROGRESS NOTES
increase ROM, strength, coordination, balance, and proprioception to improve patient's ability to progress to PLOF and address remaining functional goals. (see flow sheet as applicable)   Details if applicable:       13 08043 Therapeutic Activity (timed):  use of dynamic activities replicating functional movements to increase ROM, strength, coordination, balance, and proprioception in order to improve patient's ability to progress to PLOF and address remaining functional goals.  (see flow sheet as applicable)   Details if applicable:       18 97112 Neuromuscular Re-Education (timed):  improve balance, coordination, kinesthetic sense, posture, core stability and proprioception to improve patient's ability to develop conscious control of individual muscles and awareness of position of extremities in order to progress to PLOF and address remaining functional goals. (see flow sheet as applicable)     Details if applicable: diaphragmatic breathing, thoracic rotation, IASTM cupping static 5 minutes f/b scapular retraction in prone           [x]  Patient Education billed concurrently with other procedures   [x] Review HEP    [] Progressed/Changed HEP, detail:    [] Other detail:       Objective Information/Functional Measures/Assessment:  Patient with decreased fear avoidance today, demonstrating improved thoracic rotation in sitting progressively with practice. Patient demonstrates ~145-150 degrees of shoulder flexion/scaption AAROM using fingerladder, indicating improved shoulder ROM.      Patient will continue to benefit from skilled PT / OT services to modify and progress therapeutic interventions, analyze and address functional mobility deficits, analyze and address ROM deficits, analyze and address strength deficits, analyze and address soft tissue restrictions, analyze and cue for proper movement patterns, analyze and modify for postural abnormalities, analyze and address imbalance/dizziness, and instruct in home

## 2024-04-18 ENCOUNTER — HOSPITAL ENCOUNTER (OUTPATIENT)
Facility: HOSPITAL | Age: 49
Setting detail: RECURRING SERIES
Discharge: HOME | End: 2024-04-21
Payer: COMMERCIAL

## 2024-04-18 ENCOUNTER — OFFICE VISIT (OUTPATIENT)
Facility: CLINIC | Age: 49
End: 2024-04-18
Payer: COMMERCIAL

## 2024-04-18 VITALS
DIASTOLIC BLOOD PRESSURE: 80 MMHG | BODY MASS INDEX: 28.53 KG/M2 | HEIGHT: 63 IN | WEIGHT: 161 LBS | RESPIRATION RATE: 17 BRPM | HEART RATE: 73 BPM | TEMPERATURE: 97.8 F | OXYGEN SATURATION: 96 % | SYSTOLIC BLOOD PRESSURE: 118 MMHG

## 2024-04-18 DIAGNOSIS — M25.512 CHRONIC PAIN OF BOTH SHOULDERS: ICD-10-CM

## 2024-04-18 DIAGNOSIS — M75.102 ROTATOR CUFF SYNDROME, LEFT: ICD-10-CM

## 2024-04-18 DIAGNOSIS — M25.511 CHRONIC PAIN OF BOTH SHOULDERS: ICD-10-CM

## 2024-04-18 DIAGNOSIS — G89.29 CHRONIC PAIN OF BOTH SHOULDERS: ICD-10-CM

## 2024-04-18 DIAGNOSIS — R73.03 PREDIABETES: Primary | ICD-10-CM

## 2024-04-18 DIAGNOSIS — Z12.11 SCREEN FOR COLON CANCER: ICD-10-CM

## 2024-04-18 DIAGNOSIS — Z85.3 HISTORY OF BREAST CANCER: ICD-10-CM

## 2024-04-18 DIAGNOSIS — M75.101 ROTATOR CUFF SYNDROME OF RIGHT SHOULDER: ICD-10-CM

## 2024-04-18 DIAGNOSIS — M75.101 ROTATOR CUFF SYNDROME, RIGHT: ICD-10-CM

## 2024-04-18 PROCEDURE — 97112 NEUROMUSCULAR REEDUCATION: CPT

## 2024-04-18 PROCEDURE — 99214 OFFICE O/P EST MOD 30 MIN: CPT | Performed by: NURSE PRACTITIONER

## 2024-04-18 PROCEDURE — 97110 THERAPEUTIC EXERCISES: CPT

## 2024-04-18 PROCEDURE — 97530 THERAPEUTIC ACTIVITIES: CPT

## 2024-04-18 NOTE — PROGRESS NOTES
Joslyn Smith is a 48 y.o. female  established patient, here for evaluation of the following chief complaint(s):  Chief Complaint   Patient presents with    Follow-up     Assessment and Plan  1. Prediabetes  2. Chronic pain of both shoulders  3. Rotator cuff syndrome, right  4. History of breast cancer  5. Rotator cuff syndrome of right shoulder  6. Rotator cuff syndrome, left  7. Screen for colon cancer  -     External Referral To Gastroenterology       Return in about 1 month (around 5/18/2024) for Related to work accomodation, 30.   HPI:   In office visit. 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 She has this accomodation until 5/20/2024.     Pt is going to PT. Patient is seeing orthopedics/shoulder specialist with Dr. Cast for bilateral rotator cuff syndrome and bilateral tendinitis of her bicep muscle     Pt has a H/O anemia. She has taken iron in the past. She has seen hematology and oncology related to history of breast cancer.  Prediabetes, 2/26/2024 A1c 5.7    Pt doesn't want to do the Cologurad and will have a colonoscopy  ROS:    General: negative for - chills, fever, weight changes or malaise  HEENT: no sore throat, nasal congestion, vision problems or ear problems  Respiratory: no cough, shortness of breath, or wheezing  Cardiovascular: no chest pain, palpitations, or dyspnea on exertion  Gastrointestinal: no abdominal pain, N/V, change in bowel habits  Musculoskeletal: no back pain or joint pain  Neurological: no headache or dizziness  Endo:  No polyuria or polydipsia  : no urinary  Skin: none   Psychological: negative for - anxiety, depression, sleeps issues    Prior to Admission medications    Medication Sig Start Date End Date Taking? Authorizing Provider   tamoxifen (NOLVADEX) 10 MG tablet Take 1 tablet by mouth 9/1/23  Yes Provider, MD Caroline   ibuprofen (ADVIL;MOTRIN) 800 MG tablet TAKE 1 TABLET BY MOUTH EVERY 8 HOURS AS NEEDED 3/1/23  Yes Provider,

## 2024-04-18 NOTE — PROGRESS NOTES
Joslyn Smith is a 48 y.o. year old female who presents today for   Chief Complaint   Patient presents with    Follow-up       Is someone accompanying this pt? no    Is the patient using any DME equipment during OV? no    Depression Screening:       3/8/2024     6:45 AM 2/15/2024     3:07 PM 2023     3:11 PM   PHQ-9 Questionaire   Little interest or pleasure in doing things 0 0 0   Feeling down, depressed, or hopeless 0 0 0   PHQ-9 Total Score 0 0 0       Abuse Screenin/3/2023     1:00 PM   AMB Abuse Screening   Do you ever feel afraid of your partner? N   Are you in a relationship with someone who physically or mentally threatens you? N   Is it safe for you to go home? Y       Learning Assessment:  No question data found.    Fall Risk:      5/3/2023     1:27 PM   Fall Risk   2 or more falls in past year? no   Fall with injury in past year? no           Coordination of Care:   1. \"Have you been to the ER, urgent care clinic since your last visit?  Hospitalized since your last visit?\" no    2. \"Have you seen or consulted any other health care providers outside of the [facility] since your last visit?\" no    3. For patients aged 45-75: Has the patient had a colonoscopy / FIT/ Cologuard? no    If the patient is female:    4. For patients aged 40-74: Has the patient had a mammogram within the past 2 years? yes    5. For patients aged 21-65: Has the patient had a pap smear? no    Health Maintenance: reviewed and discussed and ordered per Provider.    Health Maintenance Due   Topic Date Due    Hepatitis B vaccine (1 of 3 - 3-dose series) Never done    Colorectal Cancer Screen  Never done    COVID-19 Vaccine ( season) 2023        -Kasey Peralta CMA  [facility]  Phone: [phone]  Fax: [phone]

## 2024-04-18 NOTE — PROGRESS NOTES
PHYSICAL / OCCUPATIONAL THERAPY - DAILY TREATMENT NOTE (updated )    Patient Name: Joslyn Smith    Date: 2024    : 1975  Insurance: Payor: [employer] [facility] / Plan: [employer] [facility] / Product Type: *No Product type* /      Patient  verified yes     Visit #   Current / Total 2 10 Total Time   Time   In / Out 10:26 11:16 50   Pain   In / Out 6 4    Subjective Functional Status/Changes: Still pain in the shoulders, neck, wrists. The wrists are bad due to working with [employer] and doing lots of lifting, reaching.   They had me do some work at the job yesterday that made me work too much, they didn't see my accommodations.     Notes that most of her shoulder pain was resulting from surgery and then unable to really rest/avoid use of the shoulders/arms due to work requirements.     Response to cupping?      TREATMENT AREA =  Right shoulder pain [M25.511]  Left shoulder pain [M25.512]    OBJECTIVE  Modalities Rationale:     decrease edema to improve patient's ability to progress to PLOF and address remaining functional goals.     min [] Estim Unattended, type/location:                                      []  w/ice    []  w/heat    min [] Estim Attended, type/location:                                     []  w/US     []  w/ice    []  w/heat    []  TENS insruct      min []  Mechanical Traction: type/lbs                   []  pro   []  sup   []  int   []  cont    []  before manual    []  after manual    min []  Ultrasound, settings/location:     10 min  unbill []  Ice     [x]  Heat    location/position: Scapula, UT, rhomboids in prone     min []  Paraffin,  details:     min []  Vasopneumatic Device, press/temp:     min []  Whirlpool / Fluido:    If using vaso (only need to measure limb vaso being performed on)      pre-treatment girth :       post-treatment girth :       measured at (landmark location) :      min []  Other:    Skin assessment post-treatment:   Intact      Therapeutic

## 2024-04-19 ENCOUNTER — HOSPITAL ENCOUNTER (OUTPATIENT)
Facility: HOSPITAL | Age: 49
Setting detail: RECURRING SERIES
Discharge: HOME | End: 2024-04-22
Payer: COMMERCIAL

## 2024-04-19 PROCEDURE — 97110 THERAPEUTIC EXERCISES: CPT

## 2024-04-19 PROCEDURE — 97112 NEUROMUSCULAR REEDUCATION: CPT

## 2024-04-19 PROCEDURE — 97530 THERAPEUTIC ACTIVITIES: CPT

## 2024-04-19 NOTE — PROGRESS NOTES
points  2.  Pt will increase AROM left shoulder flex to WNL in sitting, ABD to 145 degs in sitting, ER to 65 degs, IR to 45 degs to improve ability to tolerate donning a seat belt.  Status at last note/certification:   Shoulder AROM:  Flexion 144, ER 41 deg, IR to L5  Current: flexion 144, ER 60 deg, IR Not tested (4/18/24)    3.  Pt will increase AROM right shoulder flex to 160 degs in sitting, ABD to 130 degs in sitting to improve ability to reach with less pain.  Status at last note/certification: flex 145 degs in sitting, scaption 135 degs in sitting with pain  Current: maintaining 145 with pain and slow movement patterns (4/18/24)  4.  Pt will report having mild to no increased pain with sleeping on her sides to improve sleep quality.  Status at last note/certification: pain with laying on either side.   Current: no major progress yet (4/18/24)  5. Pt will report an improvement in at best pain to 2/10 to improve tolerance to work activities.   Status at last note/certification: 6/10    PLAN  [x] Continue plan of care  [x]  Upgrade activities as tolerated  []  Discharge due to :  [x]  Other:_assess response to sonido Yun PTA    4/19/2024    3:13 PM    Future Appointments   Date Time Provider Department Center   4/25/2024 10:20 AM [facility]   4/26/2024 10:20 AM [facility]   5/2/2024 10:20 AM Lc Yun PTA [facility]   5/3/2024 10:20 AM Lc Yun PTA [facility]   5/13/2024  2:20 PM Marcie Cabral APRN - CNP [facility] AMB   8/5/2024  1:00 PM Lili Golden DO [facility] AMB

## 2024-04-25 ENCOUNTER — HOSPITAL ENCOUNTER (OUTPATIENT)
Facility: HOSPITAL | Age: 49
Setting detail: RECURRING SERIES
Discharge: HOME | End: 2024-04-28
Payer: COMMERCIAL

## 2024-04-25 PROCEDURE — 97110 THERAPEUTIC EXERCISES: CPT

## 2024-04-25 PROCEDURE — 97112 NEUROMUSCULAR REEDUCATION: CPT

## 2024-04-25 PROCEDURE — 97530 THERAPEUTIC ACTIVITIES: CPT

## 2024-04-25 NOTE — PROGRESS NOTES
PHYSICAL / OCCUPATIONAL THERAPY - DAILY TREATMENT NOTE (updated )    Patient Name: Joslyn Smith    Date: 2024    : 1975  Insurance: Payor: [facility] / Plan: [employer] [facility]TER [facility] / Product Type: *No Product type* /      Patient  verified yes     Visit #   Current / Total 4 10 Total Time   Time   In / Out 10:26 11:10 44   Pain   In / Out 5-6 4    Subjective Functional Status/Changes: The exercises have been really difficult lately.     TREATMENT AREA =  Right shoulder pain [M25.511]  Left shoulder pain [M25.512]    OBJECTIVE    Therapeutic Procedures:  44  Total   BC Totals Reminder: bill using total billable min of TIMED therapeutic procedures (example: do not include dry needle or estim unattended, both untimed codes, in totals to left)  8-22 min = 1 unit; 23-37 min = 2 units; 38-52 min = 3 units; 53-67 min = 4 units; 68-82 min = 5 units   Tx Min  Procedure, Rationale, Specifics   12  17819 Therapeutic Exercise (timed):  increase ROM, strength, coordination, balance, and proprioception to improve patient's ability to progress to PLOF and address remaining functional goals. (see flow sheet as applicable)   Details if applicable:       14  58017 Therapeutic Activity (timed):  use of dynamic activities replicating functional movements to increase ROM, strength, coordination, balance, and proprioception in order to improve patient's ability to progress to PLOF and address remaining functional goals.  (see flow sheet as applicable)   Details if applicable:       16  66453 Neuromuscular Re-Education (timed):  improve balance, coordination, kinesthetic sense, posture, core stability and proprioception to improve patient's ability to develop conscious control of individual muscles and awareness of position of extremities in order to progress to PLOF and address remaining functional goals. (see flow sheet as applicable)     Details if applicable:           [x]  Patient Education

## 2024-04-26 ENCOUNTER — HOSPITAL ENCOUNTER (OUTPATIENT)
Facility: HOSPITAL | Age: 49
Setting detail: RECURRING SERIES
End: 2024-04-26
Payer: COMMERCIAL

## 2024-05-02 ENCOUNTER — HOSPITAL ENCOUNTER (OUTPATIENT)
Facility: HOSPITAL | Age: 49
Setting detail: RECURRING SERIES
Discharge: HOME | End: 2024-05-05
Payer: COMMERCIAL

## 2024-05-02 PROCEDURE — 97112 NEUROMUSCULAR REEDUCATION: CPT

## 2024-05-02 PROCEDURE — 97110 THERAPEUTIC EXERCISES: CPT

## 2024-05-02 NOTE — PROGRESS NOTES
PHYSICAL / OCCUPATIONAL THERAPY - DAILY TREATMENT NOTE (updated )    Patient Name: Joslyn Smith    Date: 2024    : 1975  Insurance: Payor: [facility] / Plan: AETNA BETTER [facility] / Product Type: *No Product type* /      Patient  verified yes     Visit #   Current / Total 5 10 Total Time   Time   In / Out 10:36 am 11:04 am 26   Pain   In / Out 5-6/10 5/10    Subjective Functional Status/Changes: Patient arrives 16 minutes late, noting possible need to go to ER as her abdominal area has been feeling poorly recently. She reports getting a hysterectomy 3-4 years ago which may be contributing to her abdominal pain and ill feeling.     TREATMENT AREA =  Right shoulder pain [M25.511]  Left shoulder pain [M25.512]    OBJECTIVE    Therapeutic Procedures:  26  Total MC BC Totals Reminder: bill using total billable min of TIMED therapeutic procedures (example: do not include dry needle or estim unattended, both untimed codes, in totals to left)  8-22 min = 1 unit; 23-37 min = 2 units; 38-52 min = 3 units; 53-67 min = 4 units; 68-82 min = 5 units   Tx Min Procedure, Rationale, Specifics   16 34058 Therapeutic Exercise (timed):  increase ROM, strength, coordination, balance, and proprioception to improve patient's ability to progress to PLOF and address remaining functional goals. (see flow sheet as applicable)   Details if applicable:  nerve flossing / glides     10 83698 Neuromuscular Re-Education (timed):  improve balance, coordination, kinesthetic sense, posture, core stability and proprioception to improve patient's ability to develop conscious control of individual muscles and awareness of position of extremities in order to progress to PLOF and address remaining functional goals. (see flow sheet as applicable)     Details if applicable: scapular re-education           [x]  Patient Education billed concurrently with other procedures   [x] Review HEP    [] Progressed/Changed HEP,

## 2024-05-03 ENCOUNTER — HOSPITAL ENCOUNTER (OUTPATIENT)
Facility: HOSPITAL | Age: 49
Setting detail: RECURRING SERIES
Discharge: HOME | End: 2024-05-06
Payer: COMMERCIAL

## 2024-05-03 PROCEDURE — 97110 THERAPEUTIC EXERCISES: CPT

## 2024-05-03 PROCEDURE — 97112 NEUROMUSCULAR REEDUCATION: CPT

## 2024-05-03 PROCEDURE — 97530 THERAPEUTIC ACTIVITIES: CPT

## 2024-05-03 NOTE — PROGRESS NOTES
remaining functional goals. (see flow sheet as applicable)     Details if applicable: scapular re-education, bodyblade           [x]  Patient Education billed concurrently with other procedures   [x] Review HEP    [] Progressed/Changed HEP, detail:    [] Other detail:       Objective Information/Functional Measures/Assessment:  Introduced PNF D2 bodyblade intervention to encourage horizontal adduction ROM/strength with good tolerance.     Patient will continue to benefit from skilled PT / OT services to modify and progress therapeutic interventions, analyze and address functional mobility deficits, analyze and address ROM deficits, analyze and address strength deficits, analyze and address soft tissue restrictions, analyze and cue for proper movement patterns, analyze and modify for postural abnormalities, analyze and address imbalance/dizziness, and instruct in home and community integration to address functional deficits and attain remaining goals.    Progress toward goals / Updated goals:  []  See Progress Note/Recertification  Short Term Goals: To be accomplished in 2 treatments   Pt will report compliance and independence to HEP to help the pt manage their pain and symptoms.  Status at last note/certification:  established  Current: progressed Hep with n glides to address tension throughout the n containers of the (B) UE (4/18/24)     Long Term Goals: To be accomplished in 16 treatments  Pt will increase FOTO score to 60 points to improve ability to perform ADLs.  Status at last note/certification: 42 points  2.  Pt will increase AROM left shoulder flex to WNL in sitting, ABD to 145 degs in sitting, ER to 65 degs, IR to 45 degs to improve ability to tolerate donning a seat belt.  Status at last note/certification:   Shoulder AROM:  Flexion 144, ER 41 deg, IR to L5  Current: flexion 144, ER 60 deg, IR Not tested (4/18/24)     3.  Pt will increase AROM right shoulder flex to 160 degs in sitting, ABD to 130 degs

## 2024-05-10 ENCOUNTER — HOSPITAL ENCOUNTER (OUTPATIENT)
Facility: HOSPITAL | Age: 49
Setting detail: RECURRING SERIES
Discharge: HOME | End: 2024-05-13
Payer: COMMERCIAL

## 2024-05-10 PROCEDURE — 97112 NEUROMUSCULAR REEDUCATION: CPT

## 2024-05-10 PROCEDURE — 97530 THERAPEUTIC ACTIVITIES: CPT

## 2024-05-10 PROCEDURE — 97110 THERAPEUTIC EXERCISES: CPT

## 2024-05-10 NOTE — PROGRESS NOTES
[facility] [place]   [address] [place] [address]  Phone: [phone] Fax: [phone]  PROGRESS NOTE  Patient Name: Joslyn Smith : 1975   Treatment/Medical Diagnosis: Right shoulder pain [M25.511]  Left shoulder pain [M25.512]   Referral Source:  Payor Julio César Cast MD  Payor: [facility] / [facility] / Product Type: *No Product type* /      Date of Initial Visit: 3/8/24 Attended Visits: 4 Missed Visits: 0     SUMMARY OF TREATMENT  Pt is a 48 year old female who presents to therapy today with B shoulder pain. Pt had B breast surgery/reconstruction in 2023. She had surgery on the right shoulder late last year and noticed improvements in her symptoms. 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 Treatment has consisted of: therapeutic exercise to improved UE strength/mobility; therapeutic activities to improve forward/overhead reach/lift; neuromuscular re-education to improve scapular stability, cervical stability, and UE movement patterns; physical agent/modality for symptom management;manual therapy for symptom relief and improved UE/cervical mobility; patient education to improve symptom management; self Care training; and functional mobility training.    CURRENT STATUS  Patient has attended 11 out of 12 sessions from 3/8/24-5/10/24, with fair progress toward goals. Patient continues to report limitations with reach, carrying, lifting during household chores and with work-related tasks.    Subjective:  Reported Improvement: 25%  Pain: Average 6/10   Best 4/10   Worst 7/10  Reported Functional Deficits: heavy lifting at work >20 pounds, sleeping on left side, reaching behind, carrying >20 pounds  Reported Functional Improvements: increased tolerance to ALDs  Patient Goal: \"Get better so one day I can workout again.\"      Range of

## 2024-05-10 NOTE — PROGRESS NOTES
PHYSICAL / OCCUPATIONAL THERAPY - DAILY TREATMENT NOTE (updated )    Patient Name: Joslyn Smith    Date: 5/10/2024    : 1975  Insurance: Payor: [employer] [facility] / Plan: [employer] [facility] / Product Type: *No Product type* /      Patient  verified yes     Visit #   Current / Total 7 10 Total Time   Time   In / Out 1:02 1:50 38   Pain   In / Out 6 6    Subjective Functional Status/Changes: Reported Improvement: 25%  Pain:    Average 6/10              Best 4/10              Worst 7/10  Reported Functional Deficits: heavy lifting at work >20 pounds, sleeping on left side, reaching behind, carrying >20 pounds  Reported Functional Improvements: increased tolerance to ALDs  Patient Goal: \"Get better so one day I can workout again.\"     TREATMENT AREA =  Right shoulder pain [M25.511]  Left shoulder pain [M25.512]  Modalities Rationale:     decrease pain to improve patient's ability to progress to PLOF and address remaining functional goals.     min [] Estim Unattended, type/location:                                      []  w/ice    []  w/heat    min [] Estim Attended, type/location:                                     []  w/US     []  w/ice    []  w/heat    []  TENS insruct      min []  Mechanical Traction: type/lbs                   []  pro   []  sup   []  int   []  cont    []  before manual    []  after manual    min []  Ultrasound, settings/location:     10 min  unbill []  Ice     [x]  Heat    location/position: Left shoulder in seated    min []  Paraffin,  details:     min []  Vasopneumatic Device, press/temp:     min []  Whirlpool / Fluido:    If using vaso (only need to measure limb vaso being performed on)      pre-treatment girth :       post-treatment girth :       measured at (landmark location) :      min []  Other:    Skin assessment post-treatment:   Intact        OBJECTIVE    Therapeutic Procedures:  38  Total MC BC Totals Reminder: bill using total billable min of TIMED

## 2024-05-13 ENCOUNTER — OFFICE VISIT (OUTPATIENT)
Facility: CLINIC | Age: 49
End: 2024-05-13
Payer: COMMERCIAL

## 2024-05-13 VITALS
OXYGEN SATURATION: 97 % | DIASTOLIC BLOOD PRESSURE: 86 MMHG | SYSTOLIC BLOOD PRESSURE: 123 MMHG | RESPIRATION RATE: 18 BRPM | HEART RATE: 85 BPM | WEIGHT: 160 LBS | BODY MASS INDEX: 28.35 KG/M2 | TEMPERATURE: 98.3 F | HEIGHT: 63 IN

## 2024-05-13 DIAGNOSIS — G89.29 CHRONIC PAIN OF BOTH SHOULDERS: ICD-10-CM

## 2024-05-13 DIAGNOSIS — M25.511 CHRONIC PAIN OF BOTH SHOULDERS: ICD-10-CM

## 2024-05-13 DIAGNOSIS — M75.21 BICEPS TENDINITIS OF BOTH SHOULDERS: ICD-10-CM

## 2024-05-13 DIAGNOSIS — M75.102 ROTATOR CUFF SYNDROME, LEFT: ICD-10-CM

## 2024-05-13 DIAGNOSIS — M75.22 BICEPS TENDINITIS OF BOTH SHOULDERS: ICD-10-CM

## 2024-05-13 DIAGNOSIS — M75.101 ROTATOR CUFF SYNDROME, RIGHT: Primary | ICD-10-CM

## 2024-05-13 DIAGNOSIS — M25.512 CHRONIC PAIN OF BOTH SHOULDERS: ICD-10-CM

## 2024-05-13 PROCEDURE — 99214 OFFICE O/P EST MOD 30 MIN: CPT | Performed by: NURSE PRACTITIONER

## 2024-05-13 SDOH — ECONOMIC STABILITY: FOOD INSECURITY: WITHIN THE PAST 12 MONTHS, THE FOOD YOU BOUGHT JUST DIDN'T LAST AND YOU DIDN'T HAVE MONEY TO GET MORE.: NEVER TRUE

## 2024-05-13 SDOH — ECONOMIC STABILITY: INCOME INSECURITY: HOW HARD IS IT FOR YOU TO PAY FOR THE VERY BASICS LIKE FOOD, HOUSING, MEDICAL CARE, AND HEATING?: NOT VERY HARD

## 2024-05-13 SDOH — ECONOMIC STABILITY: FOOD INSECURITY: WITHIN THE PAST 12 MONTHS, YOU WORRIED THAT YOUR FOOD WOULD RUN OUT BEFORE YOU GOT MONEY TO BUY MORE.: NEVER TRUE

## 2024-05-13 ASSESSMENT — PATIENT HEALTH QUESTIONNAIRE - PHQ9
1. LITTLE INTEREST OR PLEASURE IN DOING THINGS: NOT AT ALL
SUM OF ALL RESPONSES TO PHQ QUESTIONS 1-9: 0
SUM OF ALL RESPONSES TO PHQ9 QUESTIONS 1 & 2: 0
SUM OF ALL RESPONSES TO PHQ QUESTIONS 1-9: 0
2. FEELING DOWN, DEPRESSED OR HOPELESS: NOT AT ALL
SUM OF ALL RESPONSES TO PHQ QUESTIONS 1-9: 0
SUM OF ALL RESPONSES TO PHQ QUESTIONS 1-9: 0

## 2024-05-13 ASSESSMENT — ANXIETY QUESTIONNAIRES
7. FEELING AFRAID AS IF SOMETHING AWFUL MIGHT HAPPEN: NOT AT ALL
4. TROUBLE RELAXING: NOT AT ALL
3. WORRYING TOO MUCH ABOUT DIFFERENT THINGS: NOT AT ALL
GAD7 TOTAL SCORE: 0
6. BECOMING EASILY ANNOYED OR IRRITABLE: NOT AT ALL
1. FEELING NERVOUS, ANXIOUS, OR ON EDGE: NOT AT ALL
2. NOT BEING ABLE TO STOP OR CONTROL WORRYING: NOT AT ALL
5. BEING SO RESTLESS THAT IT IS HARD TO SIT STILL: NOT AT ALL

## 2024-05-13 NOTE — PROGRESS NOTES
Joslyn Smith is a 48 y.o. female  established patient, here for evaluation of the following chief complaint(s):  Chief Complaint   Patient presents with    Follow-up      Assessment and Plan  1. Rotator cuff syndrome, right  2. Rotator cuff syndrome, left  3. Chronic pain of both shoulders  4. Biceps tendinitis of both shoulders       Return in about 1 month (around 6/13/2024) for shoulder pain and work paperwork, 15.   HPI:   In office visit.    ED visit related to abdomen pain 5/2/2024 and found to have trichomonas and UTI. She is taking her antibiotic. 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 She has this accomodation until 5/20/2024.   Pt is going to PT. Patient is seeing orthopedics/shoulder specialist with Dr. Cast for bilateral rotator cuff syndrome and bilateral tendinitis of her bicep muscle. She has not follow-up.    I advised pt she will likely have to submit new paperwork for her accommodations at work.   ROS:    General: negative for - chills, fever, weight changes or malaise  HEENT: no sore throat, nasal congestion, vision problems or ear problems  Respiratory: no cough, shortness of breath, or wheezing  Cardiovascular: no chest pain, palpitations, or dyspnea on exertion  Gastrointestinal: no abdominal pain, N/V, change in bowel habits  Musculoskeletal: see HPI  Neurological: no headache or dizziness  Endo:  No polyuria or polydipsia  : no urinary  Skin: none   Psychological: negative for - anxiety, depression, sleeps issues    Prior to Admission medications    Medication Sig Start Date End Date Taking? Authorizing Provider   tamoxifen (NOLVADEX) 10 MG tablet Take 1 tablet by mouth 9/1/23  Yes ProviderCaroline MD   ibuprofen (ADVIL;MOTRIN) 800 MG tablet TAKE 1 TABLET BY MOUTH EVERY 8 HOURS AS NEEDED 3/1/23  Yes ProviderCaroline MD   acetaminophen (TYLENOL) 500 MG tablet Take 1 tablet by mouth every 6 hours as needed   Yes Automatic

## 2024-05-13 NOTE — PROGRESS NOTES
Joslyn Smith is a 48 y.o. year old female who presents today for   Chief Complaint   Patient presents with    Follow-up       Is someone accompanying this pt? no    Is the patient using any DME equipment during OV? no    Depression Screenin/13/2024     2:25 PM 3/8/2024     6:45 AM 2/15/2024     3:07 PM 2023     3:11 PM   PHQ-9 Questionaire   Little interest or pleasure in doing things 0 0 0 0   Feeling down, depressed, or hopeless 0 0 0 0   PHQ-9 Total Score 0 0 0 0       Abuse Screenin/3/2023     1:00 PM   AMB Abuse Screening   Do you ever feel afraid of your partner? N   Are you in a relationship with someone who physically or mentally threatens you? N   Is it safe for you to go home? Y       Learning Assessment:  No question data found.    Fall Risk:      5/3/2023     1:27 PM   Fall Risk   2 or more falls in past year? no   Fall with injury in past year? no           Coordination of Care:   1. \"Have you been to the ER, urgent care clinic since your last visit?  Hospitalized since your last visit?\" Yes, abdomen pain    2. \"Have you seen or consulted any other health care providers outside of the [facility] since your last visit?\" no    3. For patients aged 45-75: Has the patient had a colonoscopy / FIT/ Cologuard? no    If the patient is female:    4. For patients aged 40-74: Has the patient had a mammogram within the past 2 years? yes    5. For patients aged 21-65: Has the patient had a pap smear? no    Health Maintenance: reviewed and discussed and ordered per Provider.    Health Maintenance Due   Topic Date Due    Hepatitis B vaccine (1 of 3 - 3-dose series) Never done    Colorectal Cancer Screen  Never done    COVID-19 Vaccine ( season) 2023        -Kasey Peralta CMA  [facility]  Phone: [phone]  Fax: [phone]

## 2024-05-24 ENCOUNTER — HOSPITAL ENCOUNTER (OUTPATIENT)
Facility: HOSPITAL | Age: 49
Setting detail: RECURRING SERIES
Discharge: HOME | End: 2024-05-27
Payer: COMMERCIAL

## 2024-05-24 PROCEDURE — 97110 THERAPEUTIC EXERCISES: CPT

## 2024-05-24 PROCEDURE — 97530 THERAPEUTIC ACTIVITIES: CPT

## 2024-05-24 PROCEDURE — 97112 NEUROMUSCULAR REEDUCATION: CPT

## 2024-05-24 NOTE — PROGRESS NOTES
best pain to 2/10 to improve tolerance to work activities.   Status at last note/certification:  6/10 pain at best    PN due 6/10/24  Auth due 15th visit or 6/7/24    PLAN  [x] Continue plan of care  [x]  Upgrade activities as tolerated  []  Discharge due to :  []  Other:    Colton Isaacs, PT    5/24/2024    10:55 AM    Future Appointments   Date Time Provider Department Center   5/24/2024 11:40 AM [facility]   5/29/2024 11:00 AM Julio César Cast MD [facility] AMB   5/30/2024 10:20 AM [facility] PT [facility]   5/31/2024 12:20 PM Marcela Kothari, [facility]   8/5/2024  1:00 PM Lili Golden DO [facility] AMB

## 2024-05-29 ENCOUNTER — OFFICE VISIT (OUTPATIENT)
Age: 49
End: 2024-05-29
Payer: COMMERCIAL

## 2024-05-29 VITALS — BODY MASS INDEX: 28.34 KG/M2 | RESPIRATION RATE: 16 BRPM | HEIGHT: 63 IN

## 2024-05-29 DIAGNOSIS — M75.101 ROTATOR CUFF SYNDROME, RIGHT: Primary | ICD-10-CM

## 2024-05-29 DIAGNOSIS — M75.22 BICEPS TENDINITIS OF LEFT SHOULDER: ICD-10-CM

## 2024-05-29 DIAGNOSIS — M75.21 BICEPS TENDINITIS OF RIGHT SHOULDER: ICD-10-CM

## 2024-05-29 DIAGNOSIS — M75.102 ROTATOR CUFF SYNDROME, LEFT: ICD-10-CM

## 2024-05-29 PROCEDURE — 73030 X-RAY EXAM OF SHOULDER: CPT | Performed by: ORTHOPAEDIC SURGERY

## 2024-05-29 PROCEDURE — 99214 OFFICE O/P EST MOD 30 MIN: CPT | Performed by: ORTHOPAEDIC SURGERY

## 2024-05-29 NOTE — PATIENT INSTRUCTIONS
If we order a Diagnostic test (such as MRI or CT) during your office visit please see below:    If you would like your test scheduled with [facility], please call Central Scheduling at [phone] for appointment scheduling. Once scheduled, please call us back at [phone] to schedule your follow up appointment.     Coordination of Care will be calling you to schedule your diagnostic test. If you have not heard from Coordination of Care within 3 business days, please call [phone].     Once you have a date scheduled for your diagnostic test, you will need to contact our office to schedule a follow up appointment, as this is when the physician will review your diagnostic test results with you. You can contact our office to schedule appointment by phone at [phone], or you can send a message via [facility] to request an appointment.    B/L shoulder MRIs ordered today, 5/29/2024

## 2024-05-29 NOTE — PROGRESS NOTES
[facility] AMBULATORY OFFICE NOTE    Patient: Joslyn Smith                MRN: [id number]       SSN: [id number]  YOB: 1975        AGE: 49 y.o.        SEX: female  Body mass index is 28.34 kg/m².    PCP: Marcie Cabral APRN - CNP  05/29/24    Chief Complaint: Bilateral shoulder pain follow-up    HPI: Joslyn Smith is a 49 y.o. female patient who returns today for both shoulders.  She is currently been in physical therapy.  She says she is gotten some improvement but not complete resolution of her pain with therapy.  She still has pain at work.    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       Allergies   Allergen Reactions    Tuna Oil [Fish Oil] Rash     Tuna fish only       Past Surgical History:   Procedure Laterality Date    BREAST BIOPSY Bilateral 02/28/2023    Right breast localized lumpectomy, Left breast excision of mass performed by Lili Golden DO at [facility] MAIN OR    BREAST LUMPECTOMY Right 02/28/2023    RT. DCIS    BREAST RECONSTRUCTION Bilateral 02/28/2023    right breast oncoplastic reconstruction via 23 cm x 12 cm chest adjacent tissue transfer; left breast reduction for symmetry performed by Doug Rodriguez MD at [facility] MAIN OR    HYSTERECTOMY (CERVIX STATUS UNKNOWN)      US BREAST BIOPSY W LOC DEVICE 1ST LESION RIGHT Right 01/12/2023    US BREAST BIOPSY W LOC DEVICE 1ST LESION RIGHT 1/12/2023 [facility] RAD US AT [facility]       Social History

## 2024-05-30 ENCOUNTER — APPOINTMENT (OUTPATIENT)
Facility: HOSPITAL | Age: 49
End: 2024-05-30
Payer: COMMERCIAL

## 2024-05-31 ENCOUNTER — APPOINTMENT (OUTPATIENT)
Facility: HOSPITAL | Age: 49
End: 2024-05-31
Payer: COMMERCIAL

## 2024-06-06 ENCOUNTER — TELEPHONE (OUTPATIENT)
Age: 49
End: 2024-06-06

## 2024-06-06 NOTE — TELEPHONE ENCOUNTER
Lisa from [facility] scheduling states that the patient wants Dr. Cast to send her MRI order to [facility].    [facility]  -phone [phone]

## 2024-06-07 ENCOUNTER — TELEPHONE (OUTPATIENT)
Facility: CLINIC | Age: 49
End: 2024-06-07

## 2024-06-07 NOTE — TELEPHONE ENCOUNTER
----- Message from Karen aGy sent at 5/31/2024  2:28 PM EDT -----  Subject: Referral Request    Reason for referral request? Physical Therapy orders  Provider patient wants to be referred to(if known):     Provider Phone Number(if known):    Additional Information for Provider? please confirm with patient once it's   been sent over  ---------------------------------------------------------------------------  --------------  CALL BACK INFO    [phone]; OK to leave message on voicemail  ---------------------------------------------------------------------------  --------------

## 2024-06-07 NOTE — TELEPHONE ENCOUNTER
Please have patient follow-up virtual is okay.  If I am putting orders in for physical therapy I have to substantiate the order with the visit note.

## 2024-06-10 ENCOUNTER — TELEMEDICINE (OUTPATIENT)
Facility: CLINIC | Age: 49
End: 2024-06-10
Payer: COMMERCIAL

## 2024-06-10 DIAGNOSIS — M75.22 BICEPS TENDINITIS OF BOTH SHOULDERS: ICD-10-CM

## 2024-06-10 DIAGNOSIS — M75.102 ROTATOR CUFF SYNDROME, LEFT: ICD-10-CM

## 2024-06-10 DIAGNOSIS — M75.101 ROTATOR CUFF SYNDROME, RIGHT: Primary | ICD-10-CM

## 2024-06-10 DIAGNOSIS — M75.21 BICEPS TENDINITIS OF BOTH SHOULDERS: ICD-10-CM

## 2024-06-10 DIAGNOSIS — M25.512 CHRONIC PAIN OF BOTH SHOULDERS: ICD-10-CM

## 2024-06-10 DIAGNOSIS — G89.29 CHRONIC PAIN OF BOTH SHOULDERS: ICD-10-CM

## 2024-06-10 DIAGNOSIS — M25.511 CHRONIC PAIN OF BOTH SHOULDERS: ICD-10-CM

## 2024-06-10 PROCEDURE — 99213 OFFICE O/P EST LOW 20 MIN: CPT | Performed by: NURSE PRACTITIONER

## 2024-06-10 SDOH — ECONOMIC STABILITY: INCOME INSECURITY: HOW HARD IS IT FOR YOU TO PAY FOR THE VERY BASICS LIKE FOOD, HOUSING, MEDICAL CARE, AND HEATING?: NOT VERY HARD

## 2024-06-10 SDOH — ECONOMIC STABILITY: FOOD INSECURITY: WITHIN THE PAST 12 MONTHS, YOU WORRIED THAT YOUR FOOD WOULD RUN OUT BEFORE YOU GOT MONEY TO BUY MORE.: NEVER TRUE

## 2024-06-10 SDOH — ECONOMIC STABILITY: FOOD INSECURITY: WITHIN THE PAST 12 MONTHS, THE FOOD YOU BOUGHT JUST DIDN'T LAST AND YOU DIDN'T HAVE MONEY TO GET MORE.: NEVER TRUE

## 2024-06-10 ASSESSMENT — PATIENT HEALTH QUESTIONNAIRE - PHQ9
1. LITTLE INTEREST OR PLEASURE IN DOING THINGS: NOT AT ALL
SUM OF ALL RESPONSES TO PHQ QUESTIONS 1-9: 0
SUM OF ALL RESPONSES TO PHQ9 QUESTIONS 1 & 2: 0
SUM OF ALL RESPONSES TO PHQ QUESTIONS 1-9: 0
2. FEELING DOWN, DEPRESSED OR HOPELESS: NOT AT ALL

## 2024-06-10 ASSESSMENT — ANXIETY QUESTIONNAIRES
1. FEELING NERVOUS, ANXIOUS, OR ON EDGE: NOT AT ALL
2. NOT BEING ABLE TO STOP OR CONTROL WORRYING: NOT AT ALL
7. FEELING AFRAID AS IF SOMETHING AWFUL MIGHT HAPPEN: NOT AT ALL
3. WORRYING TOO MUCH ABOUT DIFFERENT THINGS: NOT AT ALL
5. BEING SO RESTLESS THAT IT IS HARD TO SIT STILL: NOT AT ALL
GAD7 TOTAL SCORE: 0
6. BECOMING EASILY ANNOYED OR IRRITABLE: NOT AT ALL
4. TROUBLE RELAXING: NOT AT ALL

## 2024-06-10 NOTE — PROGRESS NOTES
Joslyn Smith is a 49 y.o. female  established patient, here for evaluation of the following chief complaint(s):  Chief Complaint   Patient presents with    Follow-up     Referral request      Joslyn Smith, was evaluated through a synchronous (real-time) audio-video encounter. The patient (or guardian if applicable) is aware that this is a billable service, which includes applicable co-pays. This Virtual Visit was conducted with patient's (and/or legal guardian's) consent. Patient identification was verified, and a caregiver was present when appropriate.   The patient was located at Home: [address]  [place] [address]  Provider was located at Facility (Appt Dept): [address], [address]  [place] [address]  Confirm you are appropriately licensed, registered, or certified to deliver care in the state where the patient is located as indicated above. If you are not or unsure, please re-schedule the visit: Yes, I confirm.      --RAJESH JAY - CNP on 6/10/2024 at 3:25 PM    An electronic signature was used to authenticate this note.    Assessment and Plan  1. Rotator cuff syndrome, right  -     [facility]  2. Rotator cuff syndrome, left  -     [facility]  3. Chronic pain of both shoulders  -     [facility]  4. Biceps tendinitis of both shoulders  -     [facility]     Return in about 6 months (around 12/10/2024) for Routine, 15.   HPI:   Virtual visit.    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 She has this accomodation until 7/20/2024.   Pt is going to PT. Patient is seeing orthopedics/shoulder specialist with Dr. Cast for bilateral rotator cuff syndrome and bilateral tendinitis of her bicep muscle.  Patient last saw Dr. Alexander 5/29/2024. Per note, ordered MRIs

## 2024-06-10 NOTE — PROGRESS NOTES
Joslyn Smith is a 49 y.o. year old female who presents today for   Chief Complaint   Patient presents with    Follow-up     Referral request       Is someone accompanying this pt? N/A    Is the patient using any DME equipment during OV? N/A    Depression Screenin/10/2024     3:04 PM 2024     2:25 PM 3/8/2024     6:45 AM 2/15/2024     3:07 PM 2023     3:11 PM   PHQ-9 Questionaire   Little interest or pleasure in doing things 0 0 0 0 0   Feeling down, depressed, or hopeless 0 0 0 0 0   PHQ-9 Total Score 0 0 0 0 0       Abuse Screenin/3/2023     1:00 PM   AMB Abuse Screening   Do you ever feel afraid of your partner? N   Are you in a relationship with someone who physically or mentally threatens you? N   Is it safe for you to go home? Y       Learning Assessment:  No question data found.    Fall Risk:      5/3/2023     1:27 PM   Fall Risk   2 or more falls in past year? no   Fall with injury in past year? no           Coordination of Care:   1. \"Have you been to the ER, urgent care clinic since your last visit?  Hospitalized since your last visit?\" NO    2. \"Have you seen or consulted any other health care providers outside of the [facility] since your last visit?\" NO    3. For patients aged 45-75: Has the patient had a colonoscopy / FIT/ Cologuard? NO    If the patient is female:    4. For patients aged 40-74: Has the patient had a mammogram within the past 2 years? YES    5. For patients aged 21-65: Has the patient had a pap smear? NO    Health Maintenance: reviewed and discussed and ordered per Provider.    Health Maintenance Due   Topic Date Due    Hepatitis B vaccine (1 of 3 - 3-dose series) Never done    Colorectal Cancer Screen  Never done    COVID-19 Vaccine ( season) 2023        -Kasey Peralta CMA  [facility]  Phone: [phone]  Fax: [phone]

## 2024-06-18 ENCOUNTER — TELEPHONE (OUTPATIENT)
Facility: CLINIC | Age: 49
End: 2024-06-18

## 2024-06-18 NOTE — TELEPHONE ENCOUNTER
----- Message from Sneha Borjas sent at 6/14/2024  3:02 PM EDT -----  Subject: Referral Request    Reason for referral request? Pt is requesting to have a referral for P/T   on both her left and right shoulders. Pt is requesting to have it sent to   [facility] to do physical therapy. Please call patient to advise when   complete  Provider patient wants to be referred to(if known):     Provider Phone Number(if known):    Additional Information for Provider?   ---------------------------------------------------------------------------  --------------  CALL BACK INFO    [phone]; OK to leave message on voicemail  ---------------------------------------------------------------------------  --------------

## 2024-06-19 NOTE — TELEPHONE ENCOUNTER
I just did a PT referral recently to [facility] where she has been going.  Is she now wanting to go somewhere else? Can you check with her thanks,

## 2024-06-20 NOTE — TELEPHONE ENCOUNTER
Called pt, she stated that she just wanted the order to sent to [facility] instead. Advised her that order will be sent over to [facility].

## 2024-07-17 ENCOUNTER — OFFICE VISIT (OUTPATIENT)
Age: 49
End: 2024-07-17
Payer: COMMERCIAL

## 2024-07-17 DIAGNOSIS — M75.102 ROTATOR CUFF SYNDROME, LEFT: Primary | ICD-10-CM

## 2024-07-17 DIAGNOSIS — M75.101 ROTATOR CUFF SYNDROME, RIGHT: ICD-10-CM

## 2024-07-17 PROCEDURE — 20610 DRAIN/INJ JOINT/BURSA W/O US: CPT | Performed by: ORTHOPAEDIC SURGERY

## 2024-07-17 PROCEDURE — 99214 OFFICE O/P EST MOD 30 MIN: CPT | Performed by: ORTHOPAEDIC SURGERY

## 2024-07-17 RX ORDER — TRIAMCINOLONE ACETONIDE 40 MG/ML
40 INJECTION, SUSPENSION INTRA-ARTICULAR; INTRAMUSCULAR ONCE
Status: COMPLETED | OUTPATIENT
Start: 2024-07-17 | End: 2024-07-17

## 2024-07-17 RX ORDER — LIDOCAINE HYDROCHLORIDE 10 MG/ML
3 INJECTION, SOLUTION INFILTRATION; PERINEURAL ONCE
Status: COMPLETED | OUTPATIENT
Start: 2024-07-17 | End: 2024-07-17

## 2024-07-17 RX ADMIN — TRIAMCINOLONE ACETONIDE 40 MG: 40 INJECTION, SUSPENSION INTRA-ARTICULAR; INTRAMUSCULAR at 16:22

## 2024-07-17 RX ADMIN — LIDOCAINE HYDROCHLORIDE 3 ML: 10 INJECTION, SOLUTION INFILTRATION; PERINEURAL at 16:21

## 2024-07-17 NOTE — PROGRESS NOTES
[facility] AMBULATORY OFFICE NOTE    Patient: Joslyn Smith                MRN: [id number]       SSN: [id number]  YOB: 1975        AGE: 49 y.o.        SEX: female  There is no height or weight on file to calculate BMI.    PCP: Marcie Cabral APRN - CNP  07/17/24    Chief Complaint: Bilateral shoulder follow-up    HPI: Joslyn Smith is a 49 y.o. female patient who turns to the office today for both shoulders.  She had her MRIs done.  She continues to complain of bilateral shoulder pain.    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       Allergies   Allergen Reactions    Tuna Oil [Fish Oil] Rash     Tuna fish only       Past Surgical History:   Procedure Laterality Date    BREAST BIOPSY Bilateral 02/28/2023    Right breast localized lumpectomy, Left breast excision of mass performed by Lili Golden DO at [facility] MAIN OR    BREAST LUMPECTOMY Right 02/28/2023    RT. DCIS    BREAST RECONSTRUCTION Bilateral 02/28/2023    right breast oncoplastic reconstruction via 23 cm x 12 cm chest adjacent tissue transfer; left breast reduction for symmetry performed by Doug Rodriguez MD at [facility] MAIN OR    HYSTERECTOMY (CERVIX STATUS UNKNOWN)      US BREAST BIOPSY W LOC DEVICE 1ST LESION RIGHT Right 01/12/2023    US BREAST BIOPSY W LOC DEVICE 1ST LESION RIGHT 1/12/2023 [facility] RAD US AT [facility]       Social History     Socioeconomic History    Marital status: Single     Spouse

## 2024-08-05 ENCOUNTER — TELEPHONE (OUTPATIENT)
Age: 49
End: 2024-08-05

## 2024-08-05 NOTE — TELEPHONE ENCOUNTER
Left message for the patient to let her know that she needs to reschedule the appointment for 8/5/2024 because she needs an Ultrasound and mammogram.

## 2024-08-29 ENCOUNTER — HOSPITAL ENCOUNTER (OUTPATIENT)
Dept: WOMENS IMAGING | Facility: HOSPITAL | Age: 49
End: 2024-08-29
Attending: SURGERY
Payer: COMMERCIAL

## 2024-08-29 ENCOUNTER — HOSPITAL ENCOUNTER (OUTPATIENT)
Facility: HOSPITAL | Age: 49
Discharge: HOME | End: 2024-08-29
Attending: SURGERY
Payer: COMMERCIAL

## 2024-08-29 DIAGNOSIS — D05.11 DUCTAL CARCINOMA IN SITU (DCIS) OF RIGHT BREAST: ICD-10-CM

## 2024-08-29 DIAGNOSIS — R92.8 ABNORMAL MAMMOGRAM OF LEFT BREAST: ICD-10-CM

## 2024-08-29 PROCEDURE — 76642 ULTRASOUND BREAST LIMITED: CPT

## 2024-11-04 ENCOUNTER — OFFICE VISIT (OUTPATIENT)
Age: 49
End: 2024-11-04
Payer: COMMERCIAL

## 2024-11-04 VITALS
DIASTOLIC BLOOD PRESSURE: 79 MMHG | RESPIRATION RATE: 16 BRPM | WEIGHT: 153.2 LBS | HEIGHT: 63 IN | SYSTOLIC BLOOD PRESSURE: 120 MMHG | TEMPERATURE: 98 F | BODY MASS INDEX: 27.14 KG/M2 | HEART RATE: 75 BPM

## 2024-11-04 DIAGNOSIS — D05.11 DUCTAL CARCINOMA IN SITU (DCIS) OF RIGHT BREAST: Primary | ICD-10-CM

## 2024-11-04 DIAGNOSIS — N63.20 MASS OF LEFT BREAST, UNSPECIFIED QUADRANT: ICD-10-CM

## 2024-11-04 DIAGNOSIS — D24.2 FIBROADENOMA OF LEFT BREAST: ICD-10-CM

## 2024-11-04 DIAGNOSIS — N64.4 BREAST PAIN, RIGHT: ICD-10-CM

## 2024-11-04 PROCEDURE — 99214 OFFICE O/P EST MOD 30 MIN: CPT | Performed by: SURGERY

## 2024-11-04 ASSESSMENT — ENCOUNTER SYMPTOMS
SHORTNESS OF BREATH: 0
CHEST TIGHTNESS: 0

## 2024-11-05 NOTE — PROGRESS NOTES
Bra order faxed to [facility].  
Joslyn Smith is a 49 y.o. female (: 1975)     Chief Complaint   Patient presents with    Follow-up     Right breast        Medication list and allergies have been reviewed with Joslyn Smith and updated as of today's date.     I have gone over all Medical, Surgical and Social History with Joslyn Smith and updated/added the information accordingly.     1. Have you been to the ER, urgent care clinic since your last visit?  Hospitalized since your last visit?No    2. Have you seen or consulted any other health care providers outside of the [facility] since your last visit?  Include any pap smears or colon screening. No    
PT order faxed to [facility]   
LEFT BREAST  ULTRASOUND LIMITED    HISTORY: Follow-up of the left breast. History of right breast cancer status  post lumpectomy.    COMPARISON: 2024 breast imaging.    TARGETED LEFT BREAST ULTRASOUND FINDINGS:    Targeted ultrasound of the left breast was performed. At the 1 o'clock position,  1-2 cm from the nipple there is an oval circumscribed hypoechoic mass measuring  1.6 x 0.5 x 1.1 cm. No internal vascularity on Doppler imaging. This is overall  unchanged previously measuring 1.2 x 1.7 x 0.5 cm remains probably benign.  Impression: Mass in the left breast remains probably benign. Recommend additional 6-month  follow-up at which time patient will be due for bilateral annual exam.    BIRADS 3: Probably benign finding: Short interval follow up suggested. Recall 6  months    Electronically signed by Jasper Mayfield         Physical Exam:  /79   Pulse 75   Temp 98 °F (36.7 °C)   Resp 16   Ht 1.6 m (5' 3\")   Wt 69.5 kg (153 lb 3.2 oz)   BMI 27.14 kg/m²  BMI: Body mass index is 27.14 kg/m².    Physical Exam  Constitutional:       Appearance: Normal appearance. She is normal weight.   HENT:      Head: Normocephalic.   Eyes:      Extraocular Movements: Extraocular movements intact.      Conjunctiva/sclera: Conjunctivae normal.      Pupils: Pupils are equal, round, and reactive to light.   Cardiovascular:      Rate and Rhythm: Normal rate.   Pulmonary:      Effort: Pulmonary effort is normal.   Chest:   Breasts:     Right: Skin change present. No swelling, bleeding, inverted nipple, mass, nipple discharge or tenderness.      Left: Normal. No swelling, bleeding, inverted nipple, mass, nipple discharge, skin change or tenderness.      Comments: Minimal radiation changes to the right breast  Lymphadenopathy:      Upper Body:      Right upper body: No supraclavicular, axillary or pectoral adenopathy.      Left upper body: No supraclavicular, axillary or pectoral adenopathy.   Neurological:      General: No

## 2024-11-22 ENCOUNTER — COMMUNITY OUTREACH (OUTPATIENT)
Facility: CLINIC | Age: 49
End: 2024-11-22

## 2024-11-22 NOTE — PROGRESS NOTES
----- Message from Quita Novak sent at 9/29/2020  3:19 PM CDT -----  Regarding: Pharmacy [facility]  Type:  RX Refill Request    Who Called: [facility]  Refill or New Rx:  refill  RX Name and Strength:  amLODIPine (NORVASC) 5 MG tablet  How is the patient currently taking it? (ex. 1XDay):  ..  Is this a 30 day or 90 day RX:  90  Preferred Pharmacy with phone number:    [id number] - [facility] [address] [address] AT [facility] [address]  [address] [place] [address]  Phone: [phone] Fax: [phone]    Local or Mail Order:  local  Ordering Provider:  Hipolito Sotelo Call Back Number:  [phone]       Patient's HM shows they are overdue for Colorectal Screening.   Care Everywhere and  files searched.  No results to attach to order nor HM updated.

## 2025-01-29 ENCOUNTER — HOSPITAL ENCOUNTER (OUTPATIENT)
Facility: HOSPITAL | Age: 50
Discharge: HOME | End: 2025-02-01
Attending: SURGERY
Payer: COMMERCIAL

## 2025-01-29 ENCOUNTER — HOSPITAL ENCOUNTER (OUTPATIENT)
Dept: WOMENS IMAGING | Facility: HOSPITAL | Age: 50
Discharge: HOME | End: 2025-02-01
Attending: SURGERY
Payer: COMMERCIAL

## 2025-01-29 VITALS — WEIGHT: 153 LBS | HEIGHT: 63 IN | BODY MASS INDEX: 27.11 KG/M2

## 2025-01-29 DIAGNOSIS — N63.20 MASS OF LEFT BREAST, UNSPECIFIED QUADRANT: ICD-10-CM

## 2025-01-29 PROCEDURE — 76642 ULTRASOUND BREAST LIMITED: CPT

## 2025-01-29 PROCEDURE — G0279 TOMOSYNTHESIS, MAMMO: HCPCS

## 2025-02-19 ENCOUNTER — TELEMEDICINE (OUTPATIENT)
Facility: CLINIC | Age: 50
End: 2025-02-19
Payer: COMMERCIAL

## 2025-02-19 DIAGNOSIS — M75.102 ROTATOR CUFF SYNDROME, LEFT: ICD-10-CM

## 2025-02-19 DIAGNOSIS — M75.21 BICEPS TENDINITIS OF BOTH SHOULDERS: ICD-10-CM

## 2025-02-19 DIAGNOSIS — M75.22 BICEPS TENDINITIS OF BOTH SHOULDERS: ICD-10-CM

## 2025-02-19 DIAGNOSIS — M75.101 ROTATOR CUFF SYNDROME, RIGHT: Primary | ICD-10-CM

## 2025-02-19 DIAGNOSIS — Z85.3 HISTORY OF BREAST CANCER: ICD-10-CM

## 2025-02-19 PROCEDURE — 99214 OFFICE O/P EST MOD 30 MIN: CPT | Performed by: NURSE PRACTITIONER

## 2025-02-19 NOTE — PROGRESS NOTES
Joslyn Smith is a 49 y.o. female  established patient, here for evaluation of the following chief complaint(s):  Chief Complaint   Patient presents with    Shoulder Pain     FMLA paperwork, history of breast cancer      Joslyn Smith, was evaluated through a synchronous (real-time) audio-video encounter. The patient (or guardian if applicable) is aware that this is a billable service, which includes applicable co-pays. This Virtual Visit was conducted with patient's (and/or legal guardian's) consent. Patient identification was verified, and a caregiver was present when appropriate.   The patient was located at Home: [address]  [place] [address]  Provider was located at Facility (Appt Dept): [address], [address]  [place] [address]  Confirm you are appropriately licensed, registered, or certified to deliver care in the [facility] where the patient is located as indicated above. If you are not or unsure, please re-schedule the visit: Yes, I confirm.      --RAJESH JAY - CNP on 2/19/2025 at 3:03 PM    An electronic signature was used to authenticate this note.   Assessment and Plan  1. Rotator cuff syndrome, right  2. Rotator cuff syndrome, left  3. Biceps tendinitis of both shoulders  4. History of breast cancer       Return in about 3 months (around 5/19/2025), or if symptoms worsen or fail to improve, for Follow-up with your PCP.   HPI:   Virtual visit.  Patient works for [employer] and requesting her FMLA paperwork needed.    Patient has bilateral rotator cuff syndrome, bilateral bicep tendinitis.  Pt has a accommodation for light lifting and intermittent leave as needed     History of breast cancer. She has been seen 4 weeks ago and everything is \"fine.\" She is a using a compression sleeve machine for lymphedema.     ROS:    General: negative for - chills, fever, weight changes or malaise  HEENT: no sore throat, nasal congestion, vision problems or ear problems  Respiratory: no

## 2025-03-04 ENCOUNTER — TELEPHONE (OUTPATIENT)
Facility: CLINIC | Age: 50
End: 2025-03-04

## 2025-03-04 NOTE — TELEPHONE ENCOUNTER
Patient was returning a call from pcp, as per pt she wanted to extend her leave but was usure what pcp needed as she had a few questions for patient      Please advise      Thank you

## 2025-03-17 ENCOUNTER — TELEPHONE (OUTPATIENT)
Facility: CLINIC | Age: 50
End: 2025-03-17

## 2025-09-03 ENCOUNTER — OFFICE VISIT (OUTPATIENT)
Facility: CLINIC | Age: 50
End: 2025-09-03
Payer: COMMERCIAL

## 2025-09-03 VITALS
BODY MASS INDEX: 27.64 KG/M2 | RESPIRATION RATE: 16 BRPM | WEIGHT: 156 LBS | HEART RATE: 98 BPM | DIASTOLIC BLOOD PRESSURE: 78 MMHG | SYSTOLIC BLOOD PRESSURE: 111 MMHG | TEMPERATURE: 97.6 F | HEIGHT: 63 IN | OXYGEN SATURATION: 99 %

## 2025-09-03 DIAGNOSIS — J31.0 CHRONIC RHINITIS: ICD-10-CM

## 2025-09-03 DIAGNOSIS — Z09 HOSPITAL DISCHARGE FOLLOW-UP: Primary | ICD-10-CM

## 2025-09-03 DIAGNOSIS — J34.89 SINUS PAIN: ICD-10-CM

## 2025-09-03 DIAGNOSIS — J01.00 SUBACUTE MAXILLARY SINUSITIS: ICD-10-CM

## 2025-09-03 PROCEDURE — 99214 OFFICE O/P EST MOD 30 MIN: CPT | Performed by: NURSE PRACTITIONER

## 2025-09-03 RX ORDER — FLUTICASONE PROPIONATE 50 MCG
2 SPRAY, SUSPENSION (ML) NASAL DAILY
Qty: 16 G | Refills: 0 | Status: SHIPPED | OUTPATIENT
Start: 2025-09-03

## 2025-09-03 RX ORDER — LORATADINE AND PSEUDOEPHEDRINE SULFATE 5; 120 MG/1; MG/1
1 TABLET, EXTENDED RELEASE ORAL 2 TIMES DAILY
Qty: 24 TABLET | Refills: 0 | Status: SHIPPED | OUTPATIENT
Start: 2025-09-03

## 2025-09-03 SDOH — ECONOMIC STABILITY: FOOD INSECURITY: WITHIN THE PAST 12 MONTHS, THE FOOD YOU BOUGHT JUST DIDN'T LAST AND YOU DIDN'T HAVE MONEY TO GET MORE.: NEVER TRUE

## 2025-09-03 SDOH — ECONOMIC STABILITY: FOOD INSECURITY: WITHIN THE PAST 12 MONTHS, YOU WORRIED THAT YOUR FOOD WOULD RUN OUT BEFORE YOU GOT MONEY TO BUY MORE.: NEVER TRUE

## 2025-09-03 ASSESSMENT — PATIENT HEALTH QUESTIONNAIRE - PHQ9
SUM OF ALL RESPONSES TO PHQ QUESTIONS 1-9: 0
1. LITTLE INTEREST OR PLEASURE IN DOING THINGS: NOT AT ALL
2. FEELING DOWN, DEPRESSED OR HOPELESS: NOT AT ALL

## (undated) DEVICE — STERILE POLYISOPRENE POWDER-FREE SURGICAL GLOVES: Brand: PROTEXIS

## (undated) DEVICE — APPLICATOR MEDICATED 26 CC SOLUTION HI LT ORNG CHLORAPREP

## (undated) DEVICE — SUT VCRL + 2-0 27IN SH UD --

## (undated) DEVICE — SEALER TISS L20CM DIA13MM ADV BPLR L CRV JAW OPN APPRCH

## (undated) DEVICE — AGENT HEMSTAT W6XL9IN OXIDIZED REGENERATED CELOS ABSRB FOR

## (undated) DEVICE — SYRINGE IRRIG 60ML SFT PLIABLE BLB EZ TO GRP 1 HND USE W/

## (undated) DEVICE — SUTURE VCRL SZ 2-0 L27IN ABSRB UD L26MM CT-2 1/2 CIR J269H

## (undated) DEVICE — MARKER,SKIN,WI/RULER AND LABELS: Brand: MEDLINE

## (undated) DEVICE — GLOVE ORANGE PI 7 1/2   MSG9075

## (undated) DEVICE — SOLUTION IV 1000ML 0.9% SOD CHL

## (undated) DEVICE — ADHESIVE SKIN CLSR 0.7ML TOP DERMBND ADV

## (undated) DEVICE — NEEDLE HYPO 21GA L1.5IN INTRAMUSCULAR S STL LATCH BVL UP

## (undated) DEVICE — INTENDED FOR TISSUE SEPARATION, AND OTHER PROCEDURES THAT REQUIRE A SHARP SURGICAL BLADE TO PUNCTURE OR CUT.: Brand: BARD-PARKER SAFETY BLADES SIZE 15, STERILE

## (undated) DEVICE — COVER INSTRUMENT LOCALIZER

## (undated) DEVICE — SUT VCRL + 1 36IN CT1 VIO --

## (undated) DEVICE — Z DISCONTINUED USE 2857930 NEEDLE HYPO 25GA L1.5IN BVL ORIENTED ECLIPSE

## (undated) DEVICE — SYR 20ML LL STRL LF --

## (undated) DEVICE — MAJOR PLASTIC-LF: Brand: MEDLINE INDUSTRIES, INC.

## (undated) DEVICE — INTENDED FOR TISSUE SEPARATION, AND OTHER PROCEDURES THAT REQUIRE A SHARP SURGICAL BLADE TO PUNCTURE OR CUT.: Brand: BARD-PARKER ® CARBON RIB-BACK BLADES

## (undated) DEVICE — SUTURE VCRL + SZ 3-0 L36IN ABSRB UD L36MM CT-1 1/2 CIR VCP944H

## (undated) DEVICE — SUTURE MCRYL SZ 4-0 L18IN ABSRB UD L19MM PS-2 3/8 CIR PRIM Y496G

## (undated) DEVICE — Device

## (undated) DEVICE — INTENDED FOR TISSUE SEPARATION, AND OTHER PROCEDURES THAT REQUIRE A SHARP SURGICAL BLADE TO PUNCTURE OR CUT.: Brand: BARD-PARKER SAFETY BLADES SIZE 10, STERILE

## (undated) DEVICE — SUTURE MCRYL SZ 3-0 L18IN ABSRB UD L19MM PS-2 3/8 CIR PRIM Y497G

## (undated) DEVICE — SHEARS ENDOSCP L9CM CRV HARM FOCS +

## (undated) DEVICE — GAUZE,SPONGE,4"X4",16PLY,STRL,LF,10/TRAY: Brand: MEDLINE

## (undated) DEVICE — BLADE ELECTRODE: Brand: EDGE

## (undated) DEVICE — PAD,ABDOMINAL,8"X10",ST,LF: Brand: MEDLINE

## (undated) DEVICE — STAPLER EXT SKIN 35 WIDE S STL STPL SQUEEZE HNDL VISISTAT

## (undated) DEVICE — BLADE ES ELASTOMERIC COAT INSUL DURABLE BEND UPTO 90DEG

## (undated) DEVICE — SUTURE PERMA-HAND SZ 2-0 L30IN NONABSORBABLE BLK L26MM SH K833H

## (undated) DEVICE — OPTIFOAM GENTLE SA, POSTOP, 4X12: Brand: MEDLINE

## (undated) DEVICE — INTENDED FOR TISSUE SEPARATION, AND OTHER PROCEDURES THAT REQUIRE A SHARP SURGICAL BLADE TO PUNCTURE OR CUT.: Brand: BARD-PARKER ® STAINLESS STEEL BLADES

## (undated) DEVICE — KIT CLN UP BON SECOURS MARYV

## (undated) DEVICE — COVER,LIGHT HANDLE,FLX,1/PK: Brand: MEDLINE INDUSTRIES, INC.

## (undated) DEVICE — STERILE POLYISOPRENE POWDER-FREE SURGICAL GLOVES WITH EMOLLIENT COATING: Brand: PROTEXIS

## (undated) DEVICE — 3M™ IOBAN™ 2 ANTIMICROBIAL INCISE DRAPE 6640EZ: Brand: IOBAN™ 2

## (undated) DEVICE — PROBE SET W/ DRP

## (undated) DEVICE — INTENDED FOR TISSUE SEPARATION, AND OTHER PROCEDURES THAT REQUIRE A SHARP SURGICAL BLADE TO PUNCTURE OR CUT.: Brand: BARD-PARKER SAFETY BLADES SIZE 11, STERILE

## (undated) DEVICE — DRAIN SURG 15FR SIL RND CHN W/ TRCR FULL FLUT DBL WRP TRAD

## (undated) DEVICE — GARMENT,MEDLINE,DVT,INT,CALF,MED, GEN2: Brand: MEDLINE

## (undated) DEVICE — SPONGE LAP 18X18IN STRL -- 5/PK

## (undated) DEVICE — DRESSING,GAUZE,XEROFORM,CURAD,1"X8",ST: Brand: CURAD

## (undated) DEVICE — MATERNITY PAD,HEAVY: Brand: CURITY

## (undated) DEVICE — SUTURE PDS + SZ 1 L96IN ABSRB VLT L65MM TP-1 1/2 CIR PDP880G

## (undated) DEVICE — SUTURE NONABSORBABLE MONOFILAMENT 3-0 PS-1 18 IN BLK ETHILON 1663H

## (undated) DEVICE — GLOVE SURG SZ 6 CRM LTX FREE POLYISOPRENE POLYMER BEAD ANTI

## (undated) DEVICE — GYN ONCOLOGY: Brand: MEDLINE INDUSTRIES, INC.

## (undated) DEVICE — SOL IRR NACL 0.9% 500ML POUR --

## (undated) DEVICE — TUBING, SUCTION, 1/4" X 12', STRAIGHT: Brand: MEDLINE

## (undated) DEVICE — APPLIER CLP L9.38IN M LIG TI DISP STR RNG HNDL LIGACLP

## (undated) DEVICE — TOTAL TRAY, DB, 100% SILI FOLEY, 16FR 10: Brand: MEDLINE

## (undated) DEVICE — SHEET, DRAPE, SPLIT, STERILE: Brand: MEDLINE

## (undated) DEVICE — MEDI-VAC YANK SUCT HNDL W/TPRD BULBOUS TIP: Brand: CARDINAL HEALTH

## (undated) DEVICE — ELECTRODE PT RET AD L9FT HI MOIST COND ADH HYDRGEL CORDED

## (undated) DEVICE — GAMMEX® NON-LATEX PI UNDERGLOVE SIZE 6.5, STERILE POLYISOPRENE POWDER-FREE SURGICAL GLOVE: Brand: GAMMEX